# Patient Record
Sex: FEMALE | Race: WHITE | NOT HISPANIC OR LATINO
[De-identification: names, ages, dates, MRNs, and addresses within clinical notes are randomized per-mention and may not be internally consistent; named-entity substitution may affect disease eponyms.]

---

## 2017-12-07 ENCOUNTER — NON-APPOINTMENT (OUTPATIENT)
Age: 82
End: 2017-12-07

## 2017-12-07 ENCOUNTER — APPOINTMENT (OUTPATIENT)
Dept: CARDIOLOGY | Facility: CLINIC | Age: 82
End: 2017-12-07
Payer: MEDICARE

## 2017-12-07 ENCOUNTER — APPOINTMENT (OUTPATIENT)
Dept: UROGYNECOLOGY | Facility: CLINIC | Age: 82
End: 2017-12-07
Payer: MEDICARE

## 2017-12-07 VITALS
OXYGEN SATURATION: 95 % | WEIGHT: 119 LBS | HEIGHT: 62 IN | HEART RATE: 86 BPM | SYSTOLIC BLOOD PRESSURE: 141 MMHG | BODY MASS INDEX: 21.9 KG/M2 | DIASTOLIC BLOOD PRESSURE: 82 MMHG

## 2017-12-07 DIAGNOSIS — R60.9 EDEMA, UNSPECIFIED: ICD-10-CM

## 2017-12-07 DIAGNOSIS — N81.12 CYSTOCELE, LATERAL: ICD-10-CM

## 2017-12-07 DIAGNOSIS — N81.2 INCOMPLETE UTEROVAGINAL PROLAPSE: ICD-10-CM

## 2017-12-07 PROCEDURE — 93000 ELECTROCARDIOGRAM COMPLETE: CPT

## 2017-12-07 PROCEDURE — 99213 OFFICE O/P EST LOW 20 MIN: CPT

## 2017-12-07 PROCEDURE — 99214 OFFICE O/P EST MOD 30 MIN: CPT

## 2017-12-07 RX ORDER — BISOPROLOL FUMARATE 5 MG/1
5 TABLET, FILM COATED ORAL TWICE DAILY
Refills: 0 | Status: ACTIVE | COMMUNITY
Start: 2017-12-07

## 2017-12-14 ENCOUNTER — INPATIENT (INPATIENT)
Facility: HOSPITAL | Age: 82
LOS: 3 days | Discharge: HOME | DRG: 948 | End: 2017-12-18
Attending: INTERNAL MEDICINE | Admitting: INTERNAL MEDICINE
Payer: MEDICARE

## 2017-12-14 VITALS
SYSTOLIC BLOOD PRESSURE: 142 MMHG | OXYGEN SATURATION: 98 % | RESPIRATION RATE: 18 BRPM | HEART RATE: 104 BPM | DIASTOLIC BLOOD PRESSURE: 88 MMHG

## 2017-12-14 DIAGNOSIS — I48.91 UNSPECIFIED ATRIAL FIBRILLATION: ICD-10-CM

## 2017-12-14 DIAGNOSIS — H11.31 CONJUNCTIVAL HEMORRHAGE, RIGHT EYE: ICD-10-CM

## 2017-12-14 DIAGNOSIS — R79.1 ABNORMAL COAGULATION PROFILE: ICD-10-CM

## 2017-12-14 DIAGNOSIS — I10 ESSENTIAL (PRIMARY) HYPERTENSION: ICD-10-CM

## 2017-12-14 LAB
ALBUMIN SERPL ELPH-MCNC: 3.7 G/DL — SIGNIFICANT CHANGE UP (ref 3.3–5)
ALP SERPL-CCNC: 149 U/L — HIGH (ref 40–120)
ALT FLD-CCNC: 11 U/L RC — SIGNIFICANT CHANGE UP (ref 10–45)
ANION GAP SERPL CALC-SCNC: 13 MMOL/L — SIGNIFICANT CHANGE UP (ref 5–17)
APTT BLD: 79 SEC — HIGH (ref 27.5–37.4)
AST SERPL-CCNC: 15 U/L — SIGNIFICANT CHANGE UP (ref 10–40)
BASOPHILS # BLD AUTO: 0.1 K/UL — SIGNIFICANT CHANGE UP (ref 0–0.2)
BASOPHILS NFR BLD AUTO: 1 % — SIGNIFICANT CHANGE UP (ref 0–2)
BILIRUB SERPL-MCNC: 0.2 MG/DL — SIGNIFICANT CHANGE UP (ref 0.2–1.2)
BUN SERPL-MCNC: 24 MG/DL — HIGH (ref 7–23)
CALCIUM SERPL-MCNC: 9.1 MG/DL — SIGNIFICANT CHANGE UP (ref 8.4–10.5)
CHLORIDE SERPL-SCNC: 105 MMOL/L — SIGNIFICANT CHANGE UP (ref 96–108)
CO2 SERPL-SCNC: 23 MMOL/L — SIGNIFICANT CHANGE UP (ref 22–31)
CREAT SERPL-MCNC: 0.98 MG/DL — SIGNIFICANT CHANGE UP (ref 0.5–1.3)
EOSINOPHIL # BLD AUTO: 0.2 K/UL — SIGNIFICANT CHANGE UP (ref 0–0.5)
EOSINOPHIL NFR BLD AUTO: 2.2 % — SIGNIFICANT CHANGE UP (ref 0–6)
GLUCOSE SERPL-MCNC: 141 MG/DL — HIGH (ref 70–99)
HCT VFR BLD CALC: 43.4 % — SIGNIFICANT CHANGE UP (ref 34.5–45)
HGB BLD-MCNC: 14.7 G/DL — SIGNIFICANT CHANGE UP (ref 11.5–15.5)
INR BLD: 9.94 RATIO — CRITICAL HIGH (ref 0.88–1.16)
LYMPHOCYTES # BLD AUTO: 2.4 K/UL — SIGNIFICANT CHANGE UP (ref 1–3.3)
LYMPHOCYTES # BLD AUTO: 25.8 % — SIGNIFICANT CHANGE UP (ref 13–44)
MCHC RBC-ENTMCNC: 32.6 PG — SIGNIFICANT CHANGE UP (ref 27–34)
MCHC RBC-ENTMCNC: 33.8 GM/DL — SIGNIFICANT CHANGE UP (ref 32–36)
MCV RBC AUTO: 96.5 FL — SIGNIFICANT CHANGE UP (ref 80–100)
MONOCYTES # BLD AUTO: 0.4 K/UL — SIGNIFICANT CHANGE UP (ref 0–0.9)
MONOCYTES NFR BLD AUTO: 4.5 % — SIGNIFICANT CHANGE UP (ref 2–14)
NEUTROPHILS # BLD AUTO: 6.1 K/UL — SIGNIFICANT CHANGE UP (ref 1.8–7.4)
NEUTROPHILS NFR BLD AUTO: 66.5 % — SIGNIFICANT CHANGE UP (ref 43–77)
PLATELET # BLD AUTO: 274 K/UL — SIGNIFICANT CHANGE UP (ref 150–400)
POTASSIUM SERPL-MCNC: 4.3 MMOL/L — SIGNIFICANT CHANGE UP (ref 3.5–5.3)
POTASSIUM SERPL-SCNC: 4.3 MMOL/L — SIGNIFICANT CHANGE UP (ref 3.5–5.3)
PROT SERPL-MCNC: 6.9 G/DL — SIGNIFICANT CHANGE UP (ref 6–8.3)
PROTHROM AB SERPL-ACNC: 113.6 SEC — HIGH (ref 9.8–12.7)
RBC # BLD: 4.5 M/UL — SIGNIFICANT CHANGE UP (ref 3.8–5.2)
RBC # FLD: 12.4 % — SIGNIFICANT CHANGE UP (ref 10.3–14.5)
SODIUM SERPL-SCNC: 141 MMOL/L — SIGNIFICANT CHANGE UP (ref 135–145)
WBC # BLD: 9.2 K/UL — SIGNIFICANT CHANGE UP (ref 3.8–10.5)
WBC # FLD AUTO: 9.2 K/UL — SIGNIFICANT CHANGE UP (ref 3.8–10.5)

## 2017-12-14 PROCEDURE — 99285 EMERGENCY DEPT VISIT HI MDM: CPT | Mod: GC

## 2017-12-14 PROCEDURE — 70450 CT HEAD/BRAIN W/O DYE: CPT | Mod: 26

## 2017-12-14 PROCEDURE — 99223 1ST HOSP IP/OBS HIGH 75: CPT

## 2017-12-14 RX ORDER — BISOPROLOL FUMARATE 10 MG/1
2.5 TABLET, FILM COATED ORAL
Qty: 0 | Refills: 0 | Status: DISCONTINUED | OUTPATIENT
Start: 2017-12-14 | End: 2017-12-18

## 2017-12-14 RX ORDER — QUETIAPINE FUMARATE 200 MG/1
50 TABLET, FILM COATED ORAL
Qty: 0 | Refills: 0 | Status: DISCONTINUED | OUTPATIENT
Start: 2017-12-14 | End: 2017-12-18

## 2017-12-14 RX ORDER — PHYTONADIONE (VIT K1) 5 MG
5 TABLET ORAL ONCE
Qty: 0 | Refills: 0 | Status: COMPLETED | OUTPATIENT
Start: 2017-12-14 | End: 2017-12-14

## 2017-12-14 RX ADMIN — Medication 5 MILLIGRAM(S): at 17:24

## 2017-12-14 NOTE — H&P ADULT - HISTORY OF PRESENT ILLNESS
83 yo woman with PMH of HTN, AFib on coumadin, osteoporosis presents from [facility] assisted living in setting of elevated INR. 81 yo woman with PMH of HTN, AFib on coumadin, osteoporosis presents from [facility] assisted living in setting of abnormal lab results. Patient on coumadin. Patient with eye redness, she can not recall how long the redness has been. Denies eye pain, blurry vision (wears glasses at baseline). Patient denies gum bleeding, epistaxis, hematuria, melena, BRBPR. Patient without acute complaints at this time.  Of note, Patient recently moved here from [place] and just moved to the [facility] a few weeks ago. Patient has reported short-term memory  loss and documented dementia. 83 yo woman with PMH of HTN, AFib on coumadin, osteoporosis presents from [facility] assisted living in setting of abnormal lab results. Patient on coumadin. Patient with eye redness, she can not recall how long the redness has been. Denies eye pain, blurry vision (wears glasses at baseline). Patient denies gum bleeding, epistaxis, hematuria, melena, BRBPR. Patient without acute complaints at this time. Patient has been receiving coumadin 2.5 and 5 mg every other day at assisted living facility.  Of note, Patient recently moved here from [place] and just moved to the [facility] a few weeks ago. Patient has reported short-term memory  loss and documented dementia.

## 2017-12-14 NOTE — ED ADULT NURSE REASSESSMENT NOTE - NS ED NURSE REASSESS COMMENT FT1
Pt daughter expressed anger that the staff is not informing her on her mothers situation, after MD explained the plan of care for patient thoroughly. Daughter is angry that the CT results are not back yet. MD notified. Pt stable, resting comfortably in bed.

## 2017-12-14 NOTE — ED PROVIDER NOTE - PHYSICAL EXAMINATION
subconjuntival hemorrhage of right eye.  demented  NAD, NCAT, MMM, Trachea midline, Normal conjunctiva, CTAB, Non-tachycardic, Normal perfusion, Soft, NTND, No rebound/guarding, No edema, No deformity of extremities, Appropriate, Cooperative, Without capacity and insight, No rashes, CN grossly intact

## 2017-12-14 NOTE — ED ADULT NURSE REASSESSMENT NOTE - NS ED NURSE REASSESS COMMENT FT1
Pt expressed increasing AMS while stay in the ED. MD and RN explained plan of care to patient. Pt verbalized extreme anger and confusion about situation and stated 'no one told me anything, I have been sitting here for hours with no care'. MD and RN reassured patient about care, and explained plan for rest of stay. Pt currently resting comfortably in bed, side rails up for safety, pt next to nursing station and in sight.

## 2017-12-14 NOTE — H&P ADULT - ATTENDING COMMENTS
Dr. Lamar Christianson accepted patient's case and requested in house hospitalist team to complete admission. Patient previously unknown to me and I was assigned to case. Dr. Christianson to assume care in AM. Case discussed in detail with overnight [facility] medicine NP  Primary day team to obtain collateral information from [facility] in AM Dr. Lamar Christianson accepted patient's case and requested in house hospitalist team to complete admission. Patient previously unknown to me and I was assigned to case. Dr. Christianson to assume care in AM. Case discussed in detail with overnight [facility] medicine NP, Jovita [id number]  Primary day team to obtain collateral information from [facility] in AM Dr. Lamar Christianson accepted patient's case from the ED. and requested in house hospitalist team to complete admission. Patient previously unknown to me and I was assigned to case. Dr. Christianson to assume care in AM. Case discussed in detail with overnight [facility] medicine NP, Jovita [phone].  Primary day team to obtain collateral information from [facility] in AM

## 2017-12-14 NOTE — H&P ADULT - ASSESSMENT
81 yo woman with PMH of HTN, AFib on coumadin, osteoporosis presents from [facility] assisted living in setting of supratherapeutic INR.

## 2017-12-14 NOTE — H&P ADULT - EYES COMMENTS
subconjuntival hemorrhage medial aspect of right eye subconjunctival hemorrhage medial aspect of right eye

## 2017-12-14 NOTE — H&P ADULT - PROBLEM SELECTOR PLAN 5
Unclear why patient on debrox.  Med rec from [facility]. Primary day team to reconcile meds with PMD/[facility] in AM

## 2017-12-14 NOTE — ED PROVIDER NOTE - MEDICAL DECISION MAKING DETAILS
will get iv, labs, inr, and reassess if iNR > 9 will give vitamin K now noted subconjunctival hemorrhage may admit to inpatient for tapering and further monitoring of INR.

## 2017-12-14 NOTE — ED PROVIDER NOTE - PROGRESS NOTE DETAILS
Dr. Epps: Pt signed out to me pending INR. She is a dementia pt from the [facility] is oriented x1-2, though confused. Her INR is elevated 9.9. She is noted to have subconjunctival hemorrhage to her R eye. Will obtain CT head. Call placed to pt daughter, awaiting call back. Will giva vit K PO now. Dr. Epps: Spoke with pt chao Lui by phone. Explained lab findings and plan for CT. She understands. States pt recently moved here from [place] and was just moved into the [facility] few weeks ago. Has some dementia which manifests as short term memory deficits. MD Елена: Patient reevaluated, patient reports no symptoms at this time. Discussed labwork, and plan with daughter. After speaking with primary medical doctor Dr. Marcelino Coulter he is unable to trend INR outpatient and see her, so she would not have good f/u, recommending admission to Dr. Mathur for INR trending and monitoring. On reexamination Gen: no acute distress non toxic alert and coherent, no cyanosis HEENT: no scleral icterus PERRL EOMI right subconjunctival hemorrhage, vision 20/15 bilat with glasses,   Resp: No acute distress, equal chest rise and fall, lungs clear bilaterally, CVS: RRR S1/S2 no murmur no gallop no pedal edema, no jvd ABD: soft supple non tender, no rebound or guarding no hepatosplenomegaly no other masses. no hernias. Extremities: No deformities, no rash Neuro: Alert, No focal neuro deficits CN II -XII intact Power equal / normal. Sensory intact , ambulatory,

## 2017-12-14 NOTE — H&P ADULT - PROBLEM SELECTOR PLAN 1
Spoke with ED attending, Dr. Epps: Per discussion with outpatient provider: Dr. Marcelino Coulter he is unable to trend INR outpatient. so she would not have good f/u, PMD recommended admission to Dr. Mathur   Patient s/p Vitamin K 5 PO in the ED  Monitor PT/INR daily  Hold Coumadin  Fall Risk protocol  Bleeding Risk protocol Spoke with ED attending, Dr. Epps: Per discussion with outpatient provider: Dr. Marcelino Coulter he is unable to trend INR outpatient. so she would not have good f/u, PMD recommended admission to Dr. Mathur   Patient s/p Vitamin K 5 PO in the ED  Monitor PT/INR. Vitamin K repeat as necessary  Hold Coumadin  Fall Risk protocol  Bleeding Risk protocol

## 2017-12-14 NOTE — H&P ADULT - MENTAL STATUS
Alert to self and place and why she is here. Conversational Alert to self and place and time and why she is here. Conversational

## 2017-12-14 NOTE — H&P ADULT - RS GEN PE MLT RESP DETAILS PC
Neuro no rhonchi/no rales/respirations non-labored/clear to auscultation bilaterally/good air movement/airway patent/breath sounds equal

## 2017-12-14 NOTE — ED PROVIDER NOTE - OBJECTIVE STATEMENT
Patient with elevated INR. Patient without reported bleeding. Limited history secondary to dementia. Paper work from assisted living with report of INR 8.9.

## 2017-12-14 NOTE — ED PROVIDER NOTE - ATTENDING CONTRIBUTION TO CARE
Otherwise, Agree with H/P/MDM above by resident and myself, Anthony Garces MD.   Will follow up on labs, analgesia, reassess and disposition to the inpatient team as clinically indicated.

## 2017-12-14 NOTE — ED ADULT NURSE NOTE - OBJECTIVE STATEMENT
82 year old female presented to ED via EMS from [facility] with c/o of abnormal labs (PT, INR). Pt denies pain. Pt denies CP, SOB, nausea/vomiting, numbness/tingling, fever, cough, chills, dizziness, headache. Pt a&ox3, lung sounds clear, heart rate regular, abdomen soft nontender nondistended to palp. Skin intact. IV in right hand 20G and patent. Pt currently resting in bed, side rails up for safety. Will continue to monitor and assess while offering support and reassurance.

## 2017-12-14 NOTE — H&P ADULT - NSHPLABSRESULTS_GEN_ALL_CORE
Personally reviewed labs and noted in detail below    Reviewed  CT head    "No acute intracranial hemorrhage, mass effect, or CT evidence of acute territorial infarct. "

## 2017-12-14 NOTE — ED PROVIDER NOTE - CARE PLAN
Principal Discharge DX:	Supratherapeutic INR  Secondary Diagnosis:	Subconjunctival hemorrhage, right

## 2017-12-14 NOTE — ED PROVIDER NOTE - PMH
Adult Hypothyroidism  dx:  9/09.  meds X 3 months.  off meds since 12/09.  Afib  paroxysmal.  ( 2008).  treated with metoprolol;  History of Renal Calculi  no treatment; assymptomatic.  HTN (Hypertension)    Hypercholesterolemia    Murmur  MVP

## 2017-12-14 NOTE — H&P ADULT - NSHPSOCIALHISTORY_GEN_ALL_CORE
Lives in assisted living  Daughter, Sania, involved in care  Former smoker ~30-40 years quit 10 years ago  No EtOH use

## 2017-12-14 NOTE — ED ADULT NURSE NOTE - CHPI ED SYMPTOMS NEG
no weakness/no tingling/no nausea/no vomiting/no chills/no decreased eating/drinking/no pain/no dizziness/no numbness/no fever

## 2017-12-15 ENCOUNTER — OTHER (OUTPATIENT)
Age: 82
End: 2017-12-15

## 2017-12-15 ENCOUNTER — TRANSCRIPTION ENCOUNTER (OUTPATIENT)
Age: 82
End: 2017-12-15

## 2017-12-15 DIAGNOSIS — Z79.899 OTHER LONG TERM (CURRENT) DRUG THERAPY: ICD-10-CM

## 2017-12-15 LAB
ANION GAP SERPL CALC-SCNC: 13 MMOL/L — SIGNIFICANT CHANGE UP (ref 5–17)
APTT BLD: 37.7 SEC — HIGH (ref 27.5–37.4)
BASOPHILS # BLD AUTO: 0.09 K/UL — SIGNIFICANT CHANGE UP (ref 0–0.2)
BASOPHILS NFR BLD AUTO: 0.8 % — SIGNIFICANT CHANGE UP (ref 0–2)
BUN SERPL-MCNC: 17 MG/DL — SIGNIFICANT CHANGE UP (ref 7–23)
CALCIUM SERPL-MCNC: 9.9 MG/DL — SIGNIFICANT CHANGE UP (ref 8.4–10.5)
CHLORIDE SERPL-SCNC: 104 MMOL/L — SIGNIFICANT CHANGE UP (ref 96–108)
CO2 SERPL-SCNC: 23 MMOL/L — SIGNIFICANT CHANGE UP (ref 22–31)
CREAT SERPL-MCNC: 0.94 MG/DL — SIGNIFICANT CHANGE UP (ref 0.5–1.3)
EOSINOPHIL # BLD AUTO: 0.14 K/UL — SIGNIFICANT CHANGE UP (ref 0–0.5)
EOSINOPHIL NFR BLD AUTO: 1.3 % — SIGNIFICANT CHANGE UP (ref 0–6)
GLUCOSE SERPL-MCNC: 115 MG/DL — HIGH (ref 70–99)
HCT VFR BLD CALC: 44.9 % — SIGNIFICANT CHANGE UP (ref 34.5–45)
HGB BLD-MCNC: 14.9 G/DL — SIGNIFICANT CHANGE UP (ref 11.5–15.5)
IMM GRANULOCYTES NFR BLD AUTO: 0.1 % — SIGNIFICANT CHANGE UP (ref 0–1.5)
INR BLD: 1.75 RATIO — HIGH (ref 0.88–1.16)
LYMPHOCYTES # BLD AUTO: 1.83 K/UL — SIGNIFICANT CHANGE UP (ref 1–3.3)
LYMPHOCYTES # BLD AUTO: 17.2 % — SIGNIFICANT CHANGE UP (ref 13–44)
MAGNESIUM SERPL-MCNC: 2.1 MG/DL — SIGNIFICANT CHANGE UP (ref 1.6–2.6)
MCHC RBC-ENTMCNC: 30 PG — SIGNIFICANT CHANGE UP (ref 27–34)
MCHC RBC-ENTMCNC: 33.2 GM/DL — SIGNIFICANT CHANGE UP (ref 32–36)
MCV RBC AUTO: 90.3 FL — SIGNIFICANT CHANGE UP (ref 80–100)
MONOCYTES # BLD AUTO: 0.54 K/UL — SIGNIFICANT CHANGE UP (ref 0–0.9)
MONOCYTES NFR BLD AUTO: 5.1 % — SIGNIFICANT CHANGE UP (ref 2–14)
NEUTROPHILS # BLD AUTO: 8.06 K/UL — HIGH (ref 1.8–7.4)
NEUTROPHILS NFR BLD AUTO: 75.5 % — SIGNIFICANT CHANGE UP (ref 43–77)
PHOSPHATE SERPL-MCNC: 3 MG/DL — SIGNIFICANT CHANGE UP (ref 2.5–4.5)
PLATELET # BLD AUTO: 310 K/UL — SIGNIFICANT CHANGE UP (ref 150–400)
POTASSIUM SERPL-MCNC: 4.7 MMOL/L — SIGNIFICANT CHANGE UP (ref 3.5–5.3)
POTASSIUM SERPL-SCNC: 4.7 MMOL/L — SIGNIFICANT CHANGE UP (ref 3.5–5.3)
PROTHROM AB SERPL-ACNC: 20 SEC — HIGH (ref 10–13.1)
RBC # BLD: 4.97 M/UL — SIGNIFICANT CHANGE UP (ref 3.8–5.2)
RBC # FLD: 14 % — SIGNIFICANT CHANGE UP (ref 10.3–14.5)
SODIUM SERPL-SCNC: 140 MMOL/L — SIGNIFICANT CHANGE UP (ref 135–145)
WBC # BLD: 10.67 K/UL — HIGH (ref 3.8–10.5)
WBC # FLD AUTO: 10.67 K/UL — HIGH (ref 3.8–10.5)

## 2017-12-15 PROCEDURE — 99223 1ST HOSP IP/OBS HIGH 75: CPT

## 2017-12-15 PROCEDURE — 93010 ELECTROCARDIOGRAM REPORT: CPT

## 2017-12-15 RX ORDER — WARFARIN SODIUM 2.5 MG/1
0 TABLET ORAL
Qty: 0 | Refills: 0 | COMMUNITY

## 2017-12-15 RX ORDER — WARFARIN SODIUM 2.5 MG/1
3 TABLET ORAL ONCE
Qty: 0 | Refills: 0 | Status: COMPLETED | OUTPATIENT
Start: 2017-12-15 | End: 2017-12-15

## 2017-12-15 RX ORDER — ENOXAPARIN SODIUM 100 MG/ML
60 INJECTION SUBCUTANEOUS EVERY 12 HOURS
Qty: 0 | Refills: 0 | Status: DISCONTINUED | OUTPATIENT
Start: 2017-12-15 | End: 2017-12-18

## 2017-12-15 RX ORDER — CARBAMIDE PEROXIDE 81.86 MG/ML
2 SOLUTION/ DROPS AURICULAR (OTIC)
Qty: 0 | Refills: 0 | COMMUNITY

## 2017-12-15 RX ORDER — CARBAMIDE PEROXIDE 81.86 MG/ML
5 SOLUTION/ DROPS AURICULAR (OTIC)
Qty: 0 | Refills: 0 | COMMUNITY

## 2017-12-15 RX ORDER — WARFARIN SODIUM 2.5 MG/1
1 TABLET ORAL
Qty: 0 | Refills: 0 | COMMUNITY

## 2017-12-15 RX ORDER — CARBAMIDE PEROXIDE 81.86 MG/ML
2 SOLUTION/ DROPS AURICULAR (OTIC)
Qty: 0 | Refills: 0 | Status: DISCONTINUED | OUTPATIENT
Start: 2017-12-15 | End: 2017-12-15

## 2017-12-15 RX ORDER — HALOPERIDOL 2 MG/ML
2 SOLUTION, CONCENTRATE ORAL
Refills: 0 | Status: DISCONTINUED | COMMUNITY
Start: 2017-12-07 | End: 2017-12-15

## 2017-12-15 RX ADMIN — BISOPROLOL FUMARATE 2.5 MILLIGRAM(S): 10 TABLET, FILM COATED ORAL at 09:44

## 2017-12-15 RX ADMIN — BISOPROLOL FUMARATE 2.5 MILLIGRAM(S): 10 TABLET, FILM COATED ORAL at 17:12

## 2017-12-15 RX ADMIN — WARFARIN SODIUM 3 MILLIGRAM(S): 2.5 TABLET ORAL at 22:34

## 2017-12-15 RX ADMIN — QUETIAPINE FUMARATE 50 MILLIGRAM(S): 200 TABLET, FILM COATED ORAL at 17:13

## 2017-12-15 RX ADMIN — QUETIAPINE FUMARATE 50 MILLIGRAM(S): 200 TABLET, FILM COATED ORAL at 09:44

## 2017-12-15 RX ADMIN — ENOXAPARIN SODIUM 60 MILLIGRAM(S): 100 INJECTION SUBCUTANEOUS at 17:13

## 2017-12-15 NOTE — CONSULT NOTE ADULT - SUBJECTIVE AND OBJECTIVE BOX
[facility] - Guerrero Howard, Maximo Gee, Shelli, Jason Tavera  Office Number: [phone]    Initial Consult Note    CHIEF COMPLAINT: Patient is a 82y old  Female who presents with a chief complaint of elevated INR (15 Dec 2017 10:32)      HPI:  81 yo woman with PMH of HTN, AFib on coumadin, osteoporosis presents from [facility] assisted [facility] in setting of abnormal lab results. Patient on coumadin. Patient with right sided eye redness.  She was sent in for a high INR in the context of hyphema.  Patient denies gum bleeding, epistaxis, hematuria, melena, BRBPR. Patient without acute complaints at this time. Patient has been receiving coumadin 2.5 and 5 mg every other day at assisted living facility.  Of note, Patient recently moved here from [place] and just moved to the [facility] a few weeks ago. Patient has reported short-term memory  loss and documented dementia. (14 Dec 2017 23:09)    She was seen in our office one week ago and was noted to be doing fine.  We are not managing her iNR.        PAST MEDICAL & SURGICAL HISTORY:  History of Renal Calculi: no treatment; assymptomatic.  Murmur: MVP  Afib: paroxysmal.  ( 2008).  treated with metoprolol;  HTN (Hypertension)  Hypercholesterolemia  Adult Hypothyroidism: dx:  9/09.  meds X 3 months.  off meds since 12/09.  S/P Colonoscopy: excision &quot;benign&quot; polyps.  ( 2004)  S/P Appendectomy: age 19.      SOCIAL HISTORY:  No tobacco, ethanol, or drug abuse.    FAMILY HISTORY:  No pertinent family history in first degree relatives    No family history of acute MI or sudden cardiac death.    MEDICATIONS  (STANDING):  bisoprolol   Tablet 2.5 milliGRAM(s) Oral two times a day  QUEtiapine 50 milliGRAM(s) Oral two times a day    MEDICATIONS  (PRN):      Allergies    No Known Allergies    Intolerances        REVIEW OF SYSTEMS:       All other review of systems is negative unless indicated above    VITAL SIGNS:   Vital Signs Last 24 Hrs  T(C): 36.6 (15 Dec 2017 04:53), Max: 36.7 (14 Dec 2017 18:12)  T(F): 97.8 (15 Dec 2017 04:53), Max: 98 (14 Dec 2017 18:12)  HR: 98 (15 Dec 2017 04:53) (85 - 104)  BP: 163/87 (15 Dec 2017 04:53) (125/84 - 163/87)  BP(mean): --  RR: 17 (15 Dec 2017 04:53) (17 - 18)  SpO2: 97% (15 Dec 2017 04:53) (97% - 99%)    I&O's Summary    15 Dec 2017 07:01  -  15 Dec 2017 10:58  --------------------------------------------------------  IN: 460 mL / OUT: 0 mL / NET: 460 mL        On Exam:    Constitutional: NAD, alert and oriented x 3  Lungs:  Non-labored, breath sounds are clear bilaterally, No wheezing, rales or rhonchi  Cardiovascular: IRRR.  S1 and S2 positive.  No murmurs, rubs, gallops or clicks  Gastrointestinal: Bowel Sounds present, soft, nontender.   Lymph: No peripheral edema. No cervical lymphadenopathy.  Neurological: Alert, no focal deficits  Skin: No rashes or ulcers   Psych:  Mood & affect appropriate.    LABS: All Labs Reviewed:                        14.7   9.2   )-----------( 274      ( 14 Dec 2017 14:33 )             43.4     14 Dec 2017 14:33    141    |  105    |  24     ----------------------------<  141    4.3     |  23     |  0.98     Ca    9.1        14 Dec 2017 14:33    TPro  6.9    /  Alb  3.7    /  TBili  0.2    /  DBili  x      /  AST  15     /  ALT  11     /  AlkPhos  149    14 Dec 2017 14:33    PT/INR - ( 14 Dec 2017 14:39 )   PT: 113.6 sec;   INR: 9.94 ratio         PTT - ( 14 Dec 2017 14:39 )  PTT:79.0 sec         RADIOLOGY:    < from: CT Head No Cont (12.14.17 @ 18:36) >    EXAM:  CT BRAIN                            PROCEDURE DATE:  12/14/2017            INTERPRETATION:  NONCONTRAST CT OF THE BRAIN DATED 12/14/2017.    CLINICAL HISTORY: Supratherapeutic INR, evaluate for intracranial   hemorrhage.    TECHNIQUE: Axial CT images are obtained from the cranial vertex to the   skull base without the administration of IV contrast. Coronal and   sagittal reformats were performed.    No comparison studies available.    FINDINGS:    There is no acute intracranial hemorrhage. There is no mass effect or   shift of the midline. The basal cisterns are not effaced. There is   cerebral volume loss with prominence of the ventricles and sulci. Mild   chronic ischemic changes are seen in the frontoparietal white matter.   Thereis no CT evidence of a acute territorial infarct.    Visualized paranasal sinuses and mastoid air cells are well aerated.    IMPRESSION:    No acute intracranial hemorrhage, mass effect, or CT evidence of acute   territorial infarct.    < end of copied text >

## 2017-12-15 NOTE — DISCHARGE NOTE ADULT - MEDICATION SUMMARY - MEDICATIONS TO TAKE
I will START or STAY ON the medications listed below when I get home from the hospital:    Tylenol 325 mg oral tablet  -- orally 2 times a day  -- Indication: For pain     aspirin 325 mg oral tablet  -- 1 tab(s) by mouth 2 times a day - per RN at [facility] Rx for back pain from Dr. Coulter  -- Indication: For back pain    warfarin 2 mg oral tablet  -- 1 tab(s) by mouth once a day . Have your INR checked.   -- Indication: For Atrial fibrillation    SEROquel 50 mg oral tablet  -- 1 tab(s) by mouth 2 times a day  -- Indication: For Anxiety    bisoprolol 5 mg oral tablet  -- 0.5 tab(s) by mouth 2 times a day  -- Indication: For High blood pressure I will START or STAY ON the medications listed below when I get home from the hospital:    Tylenol 325 mg oral tablet  -- orally 2 times a day  -- Indication: For pain     traMADol 50 mg oral tablet  -- 1 tab(s) by mouth every 6 hours, As Needed MDD:200mg  -- Caution federal law prohibits the transfer of this drug to any person other  than the person for whom it was prescribed.  May cause drowsiness.  Alcohol may intensify this effect.  Use care when operating dangerous machinery.  Obtain medical advice before taking any non-prescription drugs as some may affect the action of this medication.    -- Indication: For back pain    warfarin 1 mg oral tablet  -- 1 tab(s) by mouth once a day  x2 days (12/18 and 12/19) MDD:1mg  -- Do not take this drug if you are pregnant.  It is very important that you take or use this exactly as directed.  Do not skip doses or discontinue unless directed by your doctor.  Obtain medical advice before taking any non-prescription drugs as some may affect the action of this medication.    -- Indication: For Atrial fibrillation    warfarin 2 mg oral tablet  -- 1 tab(s) by mouth once a day. Start on 12/20. MDD:2mg  -- Do not take this drug if you are pregnant.  It is very important that you take or use this exactly as directed.  Do not skip doses or discontinue unless directed by your doctor.  Obtain medical advice before taking any non-prescription drugs as some may affect the action of this medication.    -- Indication: For Atrial fibrillation    SEROquel 50 mg oral tablet  -- 1 tab(s) by mouth 2 times a day  -- Indication: For Anxiety    bisoprolol 5 mg oral tablet  -- 0.5 tab(s) by mouth 2 times a day  -- Indication: For High blood pressure I will START or STAY ON the medications listed below when I get home from the hospital:    traMADol 50 mg oral tablet  -- 1 tab(s) by mouth every 6 hours, As Needed MDD:200mg  -- Caution federal law prohibits the transfer of this drug to any person other  than the person for whom it was prescribed.  May cause drowsiness.  Alcohol may intensify this effect.  Use care when operating dangerous machinery.  Obtain medical advice before taking any non-prescription drugs as some may affect the action of this medication.    -- Indication: For back pain    Tylenol 325 mg oral tablet  -- 2 tab(s) by mouth 2 times a day, As Needed   -- Indication: For Back pain    warfarin 1 mg oral tablet  -- 1 tab(s) by mouth once a day  x2 days (12/18 and 12/19) MDD:1mg  -- Do not take this drug if you are pregnant.  It is very important that you take or use this exactly as directed.  Do not skip doses or discontinue unless directed by your doctor.  Obtain medical advice before taking any non-prescription drugs as some may affect the action of this medication.    -- Indication: For Atrial fibrillation    warfarin 2 mg oral tablet  -- 1 tab(s) by mouth once a day. Start on 12/20. MDD:2mg  -- Do not take this drug if you are pregnant.  It is very important that you take or use this exactly as directed.  Do not skip doses or discontinue unless directed by your doctor.  Obtain medical advice before taking any non-prescription drugs as some may affect the action of this medication.    -- Indication: For Atrial fibrillation    SEROquel 50 mg oral tablet  -- 1 tab(s) by mouth 2 times a day  -- Indication: For Mood    bisoprolol 5 mg oral tablet  -- 0.5 tab(s) by mouth 2 times a day  -- Indication: For High blood pressure

## 2017-12-15 NOTE — DISCHARGE NOTE ADULT - HOSPITAL COURSE
81 yo woman with PMH of HTN, carotid disease s/p CEA, AFib on coumadin, osteoporosis presents from [facility] assisted living in setting of abnormal lab results. Patient on coumadin. Patient with right sided eye redness.  She was sent in for a high INR in the context of hyphema. Patient denies gum bleeding, epistaxis, hematuria, melena, BRBPR. Patient without acute complaints at this time. Patient has been receiving coumadin 2.5 and 5 mg every other day at assisted living facility. Patient has reported short-term memory  loss and documented dementia. INR improved to 1.75 after Vitamin K . Lovenox therapeutic dose bridged to Coumadin was started for INR goal of 2-3. Pt will follow outpatient with Dr. Gee for INR follow-up.  Continue bisoprolol at current dose. 83 yo woman with PMH of HTN, carotid disease s/p CEA, AFib on coumadin, osteoporosis presents from [facility] assisted living in setting of abnormal lab results. Patient on coumadin. Patient with right sided eye redness.  She was sent in for a high INR in the context of hyphema. Patient denies gum bleeding, epistaxis, hematuria, melena, BRBPR. Patient without acute complaints at this time. Patient has been receiving coumadin 2.5 and 5 mg every other day at assisted living facility. Patient has reported short-term memory  loss and documented dementia. INR improved to 1.75 after Vitamin K . Lovenox therapeutic dose bridged to Coumadin was started for INR goal of 2-3. Pt will follow outpatient with Dr. Gee for INR follow-up.  Continue bisoprolol at current dose. Patient to be discharged on 1mg warfarin once daily x2days (12/18 and 12/19), and start warfarin 2mg once daily on 12/20, and should see her PMD on 12/21 for the next PT/INR evaluation. Aspirin 325mg BID discontinued at discharge, patient may take tramadol 50mg d4pxpoh as needed for pain.

## 2017-12-15 NOTE — DISCHARGE NOTE ADULT - ADDITIONAL INSTRUCTIONS
Follow-up with your primary care physician within 1 week. Have INR checked. Call for appointment.   Follow-up with cardiologist Dr. Gee. Dr. Gee THURSDAY 12/21/17 for INR check *****

## 2017-12-15 NOTE — DISCHARGE NOTE ADULT - NS AS DC VTE INSTRUCTION
Coumadin/Warfarin - Dietary Advice.../Coumadin/Warfarin - Compliance.../Coumadin/Warfarin - Potential for adverse drug reactions and interactions/Coumadin/Warfarin - Follow-up monitoring... Coumadin/Warfarin - Compliance.../Coumadin/Warfarin - Dietary Advice.../Coumadin/Warfarin - Follow-up monitoring.../Coumadin/Warfarin - Potential for adverse drug reactions and interactions

## 2017-12-15 NOTE — PHYSICAL THERAPY INITIAL EVALUATION ADULT - REFERRING PHYSICIAN, REHAB EVAL
pertinent history continued... Patient has been receiving coumadin 2.5 and 5 mg every other day at assisted living facility. pertinent history continued... Patient has been receiving coumadin 2.5 and 5 mg every other day at assisted living facility.  Of note, Patient recently moved here from [place] and just moved to the [facility] a few weeks ago. Patient has reported short-term memory  loss and documented dementia..

## 2017-12-15 NOTE — PHYSICAL THERAPY INITIAL EVALUATION ADULT - LIVES WITH, PROFILE
Pt lives at the [facility]. Pt has (+)elevator within, 0 stairs to negotiate.

## 2017-12-15 NOTE — DISCHARGE NOTE ADULT - PLAN OF CARE
Resolved This medication is used to thin the blood, to prevent and treat blood clots.  Take this medication Daily as prescribed by your Health Care Provider.  Tell your Dentist, Surgeon, and other Doctors that you are on this drug.  This medication requires PT/INR Blood work monitoring,  Your appointment for a PT/INR check is on __________________  Please see below for further Coumadin Instructions Atrial fibrillation is the most common heart rhythm problem & has the risk of stroke & heart attack  It helps if you control your blood pressure, not drink more than 1-2 alcohol drinks per day, cut down on caffeine, getting treatment for over active thyroid gland, & getting exercise  Call your doctor if you feel your heart racing or beating unusually, chest tightness or pain, lightheaded, faint, shortness of breath especially with exercise  It is important to take your heart medication as prescribed  You may be on anticoagulation which is very important to take as directed - you may need blood work to monitor drug levels Low salt diet  Activity as tolerated.  Take all medication as prescribed.  Follow up with your medical doctor for routine blood pressure monitoring at your next visit.  Notify your doctor if you have any of the following symptoms:   Dizziness, Lightheadedness, Blurry vision, Headache, Chest pain, Shortness of breath Follow-up with your primary care physician within 1 week. Take 1mg warfarin once daily on 12/18 and 12/19. Take 2mg warfarin once daily starting on 12/20.  This medication is used to thin the blood, to prevent and treat blood clots.  Take this medication Daily as prescribed by your Health Care Provider.  Tell your Dentist, Surgeon, and other Doctors that you are on this drug.  This medication requires PT/INR Blood work monitoring,  Your appointment for a PT/INR check should be for Thursday 12/21.  Please see below for further Coumadin Instructions Take 1mg warfarin once daily on 12/18/17 and 12/19/17.  Then, Take 2mg warfarin once daily starting on 12/20/17. YOU MUST GET INR CHECKED THURS 12/21/17  This medication is used to thin the blood, to prevent and treat blood clots.  Take this medication Daily as prescribed by your Health Care Provider.  Tell your Dentist, Surgeon, and other Doctors that you are on this drug.  This medication requires PT/INR Blood work monitoring,  Your appointment for a PT/INR check should be for Thursday 12/21.  Please see below for further Coumadin Instructions No aspirin as risk for bleeding with couamdin/warfarin. Take tramadol as needed. Follow up with PMD form management

## 2017-12-15 NOTE — PHYSICAL THERAPY INITIAL EVALUATION ADULT - PERTINENT HX OF CURRENT PROBLEM, REHAB EVAL
Pt is an 83 y/o female with PMH of HTN, AFib on coumadin, osteoporosis presents from [facility] assisted living in setting of abnormal lab results. Patient on coumadin. Patient with eye redness, she can not recall how long the redness has been. Denies eye pain, blurry vision (wears glasses at baseline). Patient denies gum bleeding, epistaxis, hematuria, melena, BRBPR. Patient without acute complaints at this time.

## 2017-12-15 NOTE — DISCHARGE NOTE ADULT - CONDITIONS AT DISCHARGE
Alert and responsive with confusion at times. Skin color good warm and dry to touch. Respirations regular and unlabored. Denies pain or discomfort. Ambulating on unit with steady gait. Appetite good at meals. No distress noted at time of discharge.

## 2017-12-15 NOTE — DISCHARGE NOTE ADULT - MEDICATION SUMMARY - MEDICATIONS TO STOP TAKING
I will STOP taking the medications listed below when I get home from the hospital:    Debrox 6.5% otic solution  -- 5 drop(s) to each affected ear 2 times a day (7 days)    Debrox 6.5% otic solution  -- 2 drop(s) to each affected ear 2 times a day I will STOP taking the medications listed below when I get home from the hospital:    Debrox 6.5% otic solution  -- 5 drop(s) to each affected ear 2 times a day (7 days)    Debrox 6.5% otic solution  -- 2 drop(s) to each affected ear 2 times a day    aspirin 325 mg oral tablet  -- 1 tab(s) by mouth 2 times a day - per RN at [facility] Rx for back pain from Dr. Coulter I will STOP taking the medications listed below when I get home from the hospital:    Debrox 6.5% otic solution  -- 5 drop(s) to each affected ear 2 times a day (7 days)    Debrox 6.5% otic solution  -- 2 drop(s) to each affected ear 2 times a day    aspirin 325 mg oral tablet  -- 1 tab(s) by mouth 2 times a day - per RN at [facility] Rx for back pain from Dr. Coulter I will STOP taking the medications listed below when I get home from the hospital:    Debrox 6.5% otic solution  -- 5 drop(s) to each affected ear 2 times a day (7 days)    Debrox 6.5% otic solution  -- 2 drop(s) to each affected ear 2 times a day    aspirin 325 mg oral tablet  -- 1 tab(s) by mouth 2 times a day - per RN at [facility] Rx for back pain from Dr. Coulter

## 2017-12-15 NOTE — DISCHARGE NOTE ADULT - NS AS DC FOLLOWUP STROKE INST
Smoking Cessation/Influenza vaccination (VIS Pub Date: August 19, 2014)/Coumadin/Warfarin Smoking Cessation/Influenza vaccination (VIS Pub Date: August 19, 2014) Coumadin/Warfarin/Smoking Cessation/Influenza vaccination (VIS Pub Date: August 19, 2014) Coumadin/Warfarin

## 2017-12-15 NOTE — CONSULT NOTE ADULT - ASSESSMENT
82 year old woman with atrial fibrillation on AC with Coumadin, hypertension, carotid disease s/p CEA presents with hyphema and high INR, s/p vitamin K.  She should continue Coumadin with a goal INR of 2-3.  We are happy to manage her INR in our office if need be.  There is no evidence of life threatening bleeding . Continue bisoprolol at current dose.  She will follow with Dr. Gee per routine as an outpatient.

## 2017-12-15 NOTE — DISCHARGE NOTE ADULT - CARE PLAN
Principal Discharge DX:	Supratherapeutic INR  Secondary Diagnosis:	Afib Principal Discharge DX:	Supratherapeutic INR  Goal:	Resolved  Instructions for follow-up, activity and diet:	This medication is used to thin the blood, to prevent and treat blood clots.  Take this medication Daily as prescribed by your Health Care Provider.  Tell your Dentist, Surgeon, and other Doctors that you are on this drug.  This medication requires PT/INR Blood work monitoring,  Your appointment for a PT/INR check is on __________________  Please see below for further Coumadin Instructions  Secondary Diagnosis:	Afib  Instructions for follow-up, activity and diet:	Atrial fibrillation is the most common heart rhythm problem & has the risk of stroke & heart attack  It helps if you control your blood pressure, not drink more than 1-2 alcohol drinks per day, cut down on caffeine, getting treatment for over active thyroid gland, & getting exercise  Call your doctor if you feel your heart racing or beating unusually, chest tightness or pain, lightheaded, faint, shortness of breath especially with exercise  It is important to take your heart medication as prescribed  You may be on anticoagulation which is very important to take as directed - you may need blood work to monitor drug levels  Secondary Diagnosis:	HTN (Hypertension)  Instructions for follow-up, activity and diet:	Low salt diet  Activity as tolerated.  Take all medication as prescribed.  Follow up with your medical doctor for routine blood pressure monitoring at your next visit.  Notify your doctor if you have any of the following symptoms:   Dizziness, Lightheadedness, Blurry vision, Headache, Chest pain, Shortness of breath  Secondary Diagnosis:	Subconjunctival hemorrhage, right  Instructions for follow-up, activity and diet:	Follow-up with your primary care physician within 1 week. Principal Discharge DX:	Supratherapeutic INR  Goal:	Resolved  Instructions for follow-up, activity and diet:	Take 1mg warfarin once daily on 12/18 and 12/19. Take 2mg warfarin once daily starting on 12/20.  This medication is used to thin the blood, to prevent and treat blood clots.  Take this medication Daily as prescribed by your Health Care Provider.  Tell your Dentist, Surgeon, and other Doctors that you are on this drug.  This medication requires PT/INR Blood work monitoring,  Your appointment for a PT/INR check should be for Thursday 12/21.  Please see below for further Coumadin Instructions  Secondary Diagnosis:	Afib  Instructions for follow-up, activity and diet:	Atrial fibrillation is the most common heart rhythm problem & has the risk of stroke & heart attack  It helps if you control your blood pressure, not drink more than 1-2 alcohol drinks per day, cut down on caffeine, getting treatment for over active thyroid gland, & getting exercise  Call your doctor if you feel your heart racing or beating unusually, chest tightness or pain, lightheaded, faint, shortness of breath especially with exercise  It is important to take your heart medication as prescribed  You may be on anticoagulation which is very important to take as directed - you may need blood work to monitor drug levels  Secondary Diagnosis:	HTN (Hypertension)  Instructions for follow-up, activity and diet:	Low salt diet  Activity as tolerated.  Take all medication as prescribed.  Follow up with your medical doctor for routine blood pressure monitoring at your next visit.  Notify your doctor if you have any of the following symptoms:   Dizziness, Lightheadedness, Blurry vision, Headache, Chest pain, Shortness of breath  Secondary Diagnosis:	Subconjunctival hemorrhage, right  Instructions for follow-up, activity and diet:	Follow-up with your primary care physician within 1 week. Principal Discharge DX:	Supratherapeutic INR  Goal:	Resolved  Instructions for follow-up, activity and diet:	Take 1mg warfarin once daily on 12/18/17 and 12/19/17.  Then, Take 2mg warfarin once daily starting on 12/20/17. YOU MUST GET INR CHECKED THURS 12/21/17  This medication is used to thin the blood, to prevent and treat blood clots.  Take this medication Daily as prescribed by your Health Care Provider.  Tell your Dentist, Surgeon, and other Doctors that you are on this drug.  This medication requires PT/INR Blood work monitoring,  Your appointment for a PT/INR check should be for Thursday 12/21.  Please see below for further Coumadin Instructions  Secondary Diagnosis:	Afib  Instructions for follow-up, activity and diet:	Atrial fibrillation is the most common heart rhythm problem & has the risk of stroke & heart attack  It helps if you control your blood pressure, not drink more than 1-2 alcohol drinks per day, cut down on caffeine, getting treatment for over active thyroid gland, & getting exercise  Call your doctor if you feel your heart racing or beating unusually, chest tightness or pain, lightheaded, faint, shortness of breath especially with exercise  It is important to take your heart medication as prescribed  You may be on anticoagulation which is very important to take as directed - you may need blood work to monitor drug levels  Secondary Diagnosis:	HTN (Hypertension)  Instructions for follow-up, activity and diet:	Low salt diet  Activity as tolerated.  Take all medication as prescribed.  Follow up with your medical doctor for routine blood pressure monitoring at your next visit.  Notify your doctor if you have any of the following symptoms:   Dizziness, Lightheadedness, Blurry vision, Headache, Chest pain, Shortness of breath  Secondary Diagnosis:	Subconjunctival hemorrhage, right  Instructions for follow-up, activity and diet:	Follow-up with your primary care physician within 1 week.  Secondary Diagnosis:	Back pain  Instructions for follow-up, activity and diet:	No aspirin as risk for bleeding with couamdin/warfarin. Take tramadol as needed. Follow up with PMD form management

## 2017-12-15 NOTE — DISCHARGE NOTE ADULT - CARE PROVIDER_API CALL
Marcelino Coulter), Family Medicine  [address]  [address]  [place] [address]  Phone: [phone]  Fax: [phone]    Arnold Gee), Cardiovascular Disease  [address] [place] [address]  Phone: [phone]  Fax: [phone]

## 2017-12-16 LAB
INR BLD: 1.24 RATIO — HIGH (ref 0.88–1.16)
PROTHROM AB SERPL-ACNC: 13.5 SEC — HIGH (ref 9.8–12.7)

## 2017-12-16 PROCEDURE — 99232 SBSQ HOSP IP/OBS MODERATE 35: CPT

## 2017-12-16 RX ORDER — WARFARIN SODIUM 2.5 MG/1
5 TABLET ORAL ONCE
Qty: 0 | Refills: 0 | Status: COMPLETED | OUTPATIENT
Start: 2017-12-16 | End: 2017-12-16

## 2017-12-16 RX ADMIN — QUETIAPINE FUMARATE 50 MILLIGRAM(S): 200 TABLET, FILM COATED ORAL at 17:49

## 2017-12-16 RX ADMIN — BISOPROLOL FUMARATE 2.5 MILLIGRAM(S): 10 TABLET, FILM COATED ORAL at 05:25

## 2017-12-16 RX ADMIN — WARFARIN SODIUM 5 MILLIGRAM(S): 2.5 TABLET ORAL at 21:55

## 2017-12-16 RX ADMIN — ENOXAPARIN SODIUM 60 MILLIGRAM(S): 100 INJECTION SUBCUTANEOUS at 05:25

## 2017-12-16 RX ADMIN — QUETIAPINE FUMARATE 50 MILLIGRAM(S): 200 TABLET, FILM COATED ORAL at 05:25

## 2017-12-16 RX ADMIN — BISOPROLOL FUMARATE 2.5 MILLIGRAM(S): 10 TABLET, FILM COATED ORAL at 17:49

## 2017-12-16 RX ADMIN — ENOXAPARIN SODIUM 60 MILLIGRAM(S): 100 INJECTION SUBCUTANEOUS at 17:49

## 2017-12-17 LAB
INR BLD: 1.33 RATIO — HIGH (ref 0.88–1.16)
PROTHROM AB SERPL-ACNC: 15.1 SEC — HIGH (ref 10–13.1)

## 2017-12-17 PROCEDURE — 99232 SBSQ HOSP IP/OBS MODERATE 35: CPT

## 2017-12-17 RX ORDER — WARFARIN SODIUM 2.5 MG/1
5 TABLET ORAL ONCE
Qty: 0 | Refills: 0 | Status: COMPLETED | OUTPATIENT
Start: 2017-12-17 | End: 2017-12-17

## 2017-12-17 RX ADMIN — WARFARIN SODIUM 5 MILLIGRAM(S): 2.5 TABLET ORAL at 22:07

## 2017-12-17 RX ADMIN — BISOPROLOL FUMARATE 2.5 MILLIGRAM(S): 10 TABLET, FILM COATED ORAL at 05:35

## 2017-12-17 RX ADMIN — ENOXAPARIN SODIUM 60 MILLIGRAM(S): 100 INJECTION SUBCUTANEOUS at 18:01

## 2017-12-17 RX ADMIN — QUETIAPINE FUMARATE 50 MILLIGRAM(S): 200 TABLET, FILM COATED ORAL at 18:02

## 2017-12-17 RX ADMIN — ENOXAPARIN SODIUM 60 MILLIGRAM(S): 100 INJECTION SUBCUTANEOUS at 05:35

## 2017-12-17 RX ADMIN — BISOPROLOL FUMARATE 2.5 MILLIGRAM(S): 10 TABLET, FILM COATED ORAL at 18:00

## 2017-12-17 RX ADMIN — QUETIAPINE FUMARATE 50 MILLIGRAM(S): 200 TABLET, FILM COATED ORAL at 05:35

## 2017-12-18 VITALS — SYSTOLIC BLOOD PRESSURE: 123 MMHG | HEART RATE: 83 BPM | DIASTOLIC BLOOD PRESSURE: 78 MMHG

## 2017-12-18 LAB
ANION GAP SERPL CALC-SCNC: 12 MMOL/L — SIGNIFICANT CHANGE UP (ref 5–17)
BASOPHILS # BLD AUTO: 0.09 K/UL — SIGNIFICANT CHANGE UP (ref 0–0.2)
BASOPHILS NFR BLD AUTO: 1.2 % — SIGNIFICANT CHANGE UP (ref 0–2)
BUN SERPL-MCNC: 22 MG/DL — SIGNIFICANT CHANGE UP (ref 7–23)
CALCIUM SERPL-MCNC: 9.4 MG/DL — SIGNIFICANT CHANGE UP (ref 8.4–10.5)
CHLORIDE SERPL-SCNC: 103 MMOL/L — SIGNIFICANT CHANGE UP (ref 96–108)
CO2 SERPL-SCNC: 25 MMOL/L — SIGNIFICANT CHANGE UP (ref 22–31)
CREAT SERPL-MCNC: 1 MG/DL — SIGNIFICANT CHANGE UP (ref 0.5–1.3)
EOSINOPHIL # BLD AUTO: 0.19 K/UL — SIGNIFICANT CHANGE UP (ref 0–0.5)
EOSINOPHIL NFR BLD AUTO: 2.6 % — SIGNIFICANT CHANGE UP (ref 0–6)
GLUCOSE SERPL-MCNC: 93 MG/DL — SIGNIFICANT CHANGE UP (ref 70–99)
HCT VFR BLD CALC: 42.5 % — SIGNIFICANT CHANGE UP (ref 34.5–45)
HGB BLD-MCNC: 14.1 G/DL — SIGNIFICANT CHANGE UP (ref 11.5–15.5)
IMM GRANULOCYTES NFR BLD AUTO: 0.1 % — SIGNIFICANT CHANGE UP (ref 0–1.5)
INR BLD: 2.21 RATIO — HIGH (ref 0.88–1.16)
LYMPHOCYTES # BLD AUTO: 2.53 K/UL — SIGNIFICANT CHANGE UP (ref 1–3.3)
LYMPHOCYTES # BLD AUTO: 34.7 % — SIGNIFICANT CHANGE UP (ref 13–44)
MCHC RBC-ENTMCNC: 30.1 PG — SIGNIFICANT CHANGE UP (ref 27–34)
MCHC RBC-ENTMCNC: 33.2 GM/DL — SIGNIFICANT CHANGE UP (ref 32–36)
MCV RBC AUTO: 90.8 FL — SIGNIFICANT CHANGE UP (ref 80–100)
MONOCYTES # BLD AUTO: 0.61 K/UL — SIGNIFICANT CHANGE UP (ref 0–0.9)
MONOCYTES NFR BLD AUTO: 8.4 % — SIGNIFICANT CHANGE UP (ref 2–14)
NEUTROPHILS # BLD AUTO: 3.86 K/UL — SIGNIFICANT CHANGE UP (ref 1.8–7.4)
NEUTROPHILS NFR BLD AUTO: 53 % — SIGNIFICANT CHANGE UP (ref 43–77)
PLATELET # BLD AUTO: 263 K/UL — SIGNIFICANT CHANGE UP (ref 150–400)
POTASSIUM SERPL-MCNC: 4.5 MMOL/L — SIGNIFICANT CHANGE UP (ref 3.5–5.3)
POTASSIUM SERPL-SCNC: 4.5 MMOL/L — SIGNIFICANT CHANGE UP (ref 3.5–5.3)
PROTHROM AB SERPL-ACNC: 25.4 SEC — HIGH (ref 10–13.1)
RBC # BLD: 4.68 M/UL — SIGNIFICANT CHANGE UP (ref 3.8–5.2)
RBC # FLD: 13.9 % — SIGNIFICANT CHANGE UP (ref 10.3–14.5)
SODIUM SERPL-SCNC: 140 MMOL/L — SIGNIFICANT CHANGE UP (ref 135–145)
WBC # BLD: 7.29 K/UL — SIGNIFICANT CHANGE UP (ref 3.8–10.5)
WBC # FLD AUTO: 7.29 K/UL — SIGNIFICANT CHANGE UP (ref 3.8–10.5)

## 2017-12-18 PROCEDURE — 80053 COMPREHEN METABOLIC PANEL: CPT

## 2017-12-18 PROCEDURE — 97162 PT EVAL MOD COMPLEX 30 MIN: CPT

## 2017-12-18 PROCEDURE — 85730 THROMBOPLASTIN TIME PARTIAL: CPT

## 2017-12-18 PROCEDURE — 85027 COMPLETE CBC AUTOMATED: CPT

## 2017-12-18 PROCEDURE — 84100 ASSAY OF PHOSPHORUS: CPT

## 2017-12-18 PROCEDURE — 99232 SBSQ HOSP IP/OBS MODERATE 35: CPT

## 2017-12-18 PROCEDURE — 80048 BASIC METABOLIC PNL TOTAL CA: CPT

## 2017-12-18 PROCEDURE — 93005 ELECTROCARDIOGRAM TRACING: CPT

## 2017-12-18 PROCEDURE — 70450 CT HEAD/BRAIN W/O DYE: CPT

## 2017-12-18 PROCEDURE — 85610 PROTHROMBIN TIME: CPT

## 2017-12-18 PROCEDURE — 99285 EMERGENCY DEPT VISIT HI MDM: CPT | Mod: 25

## 2017-12-18 PROCEDURE — 83735 ASSAY OF MAGNESIUM: CPT

## 2017-12-18 RX ORDER — QUETIAPINE FUMARATE 200 MG/1
1 TABLET, FILM COATED ORAL
Qty: 60 | Refills: 0
Start: 2017-12-18 | End: 2018-01-16

## 2017-12-18 RX ORDER — WARFARIN SODIUM 2.5 MG/1
1 TABLET ORAL
Qty: 2 | Refills: 0 | OUTPATIENT
Start: 2017-12-18 | End: 2017-12-19

## 2017-12-18 RX ORDER — WARFARIN SODIUM 2.5 MG/1
1 TABLET ORAL
Qty: 14 | Refills: 0 | OUTPATIENT
Start: 2017-12-18 | End: 2017-12-31

## 2017-12-18 RX ORDER — TRAMADOL HYDROCHLORIDE 50 MG/1
1 TABLET ORAL
Qty: 56 | Refills: 0 | OUTPATIENT
Start: 2017-12-18 | End: 2017-12-31

## 2017-12-18 RX ORDER — QUETIAPINE FUMARATE 200 MG/1
1 TABLET, FILM COATED ORAL
Qty: 0 | Refills: 0 | COMMUNITY

## 2017-12-18 RX ORDER — TRAMADOL HYDROCHLORIDE 50 MG/1
1 TABLET ORAL
Qty: 56 | Refills: 0
Start: 2017-12-18 | End: 2017-12-31

## 2017-12-18 RX ORDER — ACETAMINOPHEN 500 MG
0 TABLET ORAL
Qty: 0 | Refills: 0 | COMMUNITY

## 2017-12-18 RX ORDER — WARFARIN SODIUM 2.5 MG/1
1 TABLET ORAL
Qty: 0 | Refills: 0 | COMMUNITY

## 2017-12-18 RX ORDER — WARFARIN SODIUM 2.5 MG/1
1 TABLET ORAL
Qty: 2 | Refills: 0
Start: 2017-12-18 | End: 2017-12-19

## 2017-12-18 RX ORDER — ACETAMINOPHEN 500 MG
2 TABLET ORAL
Qty: 56 | Refills: 0
Start: 2017-12-18 | End: 2017-12-31

## 2017-12-18 RX ORDER — ASPIRIN/CALCIUM CARB/MAGNESIUM 324 MG
1 TABLET ORAL
Qty: 0 | Refills: 0 | COMMUNITY

## 2017-12-18 RX ADMIN — QUETIAPINE FUMARATE 50 MILLIGRAM(S): 200 TABLET, FILM COATED ORAL at 05:23

## 2017-12-18 RX ADMIN — BISOPROLOL FUMARATE 2.5 MILLIGRAM(S): 10 TABLET, FILM COATED ORAL at 05:23

## 2017-12-18 RX ADMIN — BISOPROLOL FUMARATE 2.5 MILLIGRAM(S): 10 TABLET, FILM COATED ORAL at 17:47

## 2017-12-18 RX ADMIN — ENOXAPARIN SODIUM 60 MILLIGRAM(S): 100 INJECTION SUBCUTANEOUS at 05:23

## 2017-12-18 RX ADMIN — QUETIAPINE FUMARATE 50 MILLIGRAM(S): 200 TABLET, FILM COATED ORAL at 17:47

## 2017-12-18 NOTE — PROGRESS NOTE ADULT - PROBLEM SELECTOR PLAN 4
Monitor vitals.  C/W Bisoprolol

## 2017-12-18 NOTE — PROGRESS NOTE ADULT - PROBLEM SELECTOR PLAN 5
Unclear why patient on debrox.  Med rec from [facility].
Unclear why patient on debrox.  Med rec from [facility].
Unclear why patient on debrox.  Med rec from [facility].
Unclear why patient on debrox.  Med rec from [facility].

## 2017-12-18 NOTE — PROGRESS NOTE ADULT - PROBLEM SELECTOR PROBLEM 2
Subconjunctival hemorrhage, right

## 2017-12-18 NOTE — PROGRESS NOTE ADULT - SUBJECTIVE AND OBJECTIVE BOX
Follow up: HTN, AF    HPI:  81 yo woman with PMH of HTN, AFib on coumadin, osteoporosis presents from [facility] assisted living in setting of abnormal lab results. Patient on coumadin. Patient with eye redness, she can not recall how long the redness has been. Denies eye pain, blurry vision (wears glasses at baseline). Patient denies gum bleeding, epistaxis, hematuria, melena, BRBPR. Patient without acute complaints at this time. Patient has been receiving coumadin 2.5 and 5 mg every other day at assisted living facility.  Of note, Patient recently moved here from [place] and just moved to the [facility] a few weeks ago. Patient has reported short-term memory  loss and documented dementia. (14 Dec 2017 23:09)    She is awake and alert, ambulating without new complaints. She reports no chest pain or dyspnea.      PAST MEDICAL & SURGICAL HISTORY:  History of Renal Calculi: no treatment; assymptomatic.  Murmur: MVP  Afib: paroxysmal.  ( 2008).  treated with metoprolol;  HTN (Hypertension)  Hypercholesterolemia  Adult Hypothyroidism: dx:  9/09.  meds X 3 months.  off meds since 12/09.  S/P Colonoscopy: excision &quot;benign&quot; polyps.  ( 2004)  S/P Appendectomy: age 19.      MEDICATIONS  (STANDING):  bisoprolol   Tablet 2.5 milliGRAM(s) Oral two times a day  enoxaparin Injectable 60 milliGRAM(s) SubCutaneous every 12 hours  QUEtiapine 50 milliGRAM(s) Oral two times a day      Vital Signs Last 24 Hrs  T(C): 36.6 (17 Dec 2017 04:48), Max: 36.6 (16 Dec 2017 20:16)  T(F): 97.8 (17 Dec 2017 04:48), Max: 97.9 (16 Dec 2017 20:16)  HR: 74 (17 Dec 2017 04:48) (74 - 87)  BP: 114/73 (17 Dec 2017 04:48) (114/73 - 141/78)  BP(mean): --  RR: 17 (17 Dec 2017 04:48) (17 - 18)  SpO2: 98% (17 Dec 2017 04:48) (96% - 98%)    I&O's Summary    16 Dec 2017 07:01  -  17 Dec 2017 07:00  --------------------------------------------------------  IN: 960 mL / OUT: 0 mL / NET: 960 mL        PHYSICAL EXAM:    Constitutional: NAD, awake and alert, well-developed  Eyes:    Pupils round, no lesions  ENMT: no exudate or erythema  Pulmonary: Non-labored, breath sounds are clear bilaterally, No wheezing, rales or rhonchi  Cardiovascular: PMI not palpable irreg normal S1 and S2, no murmurs, rubs, gallops or clicks  Gastrointestinal: Bowel Sounds present, soft, nontender.   Lymph: No cervical lymphadenopathy.  Neurological: Alert, no focal deficits  Skin: No rashes.  No cyanosis.  Psych:  Mood & affect appropriate   Ext: No lower ext edema                                14.9   10.67 )-----------( 310      ( 15 Dec 2017 10:48 )             44.9     12-15    140  |  104  |  17  ----------------------------<  115<H>  4.7   |  23  |  0.94    Ca    9.9      15 Dec 2017 10:48  Phos  3.0     12-15  Mg     2.1     12-15    < from: 12 Lead ECG (12.15.17 @ 00:54) >    Ventricular Rate 90 BPM    Atrial Rate 227 BPM    QRS Duration 76 ms     ms    QTc 445 ms    R Axis 7 degrees    T Axis 34 degrees    Diagnosis Line ATRIAL FIBRILLATION  ABNORMAL ECG    < end of copied text >
Follow up: HTN, AF    HPI:  81 yo woman with PMH of HTN, AFib on coumadin, osteoporosis presents from [facility] assisted living in setting of abnormal lab results. Patient on coumadin. Patient with eye redness, she can not recall how long the redness has been. Denies eye pain, blurry vision (wears glasses at baseline). Patient denies gum bleeding, epistaxis, hematuria, melena, BRBPR. Patient without acute complaints at this time. Patient has been receiving coumadin 2.5 and 5 mg every other day at assisted living facility.  Of note, Patient recently moved here from [place] and just moved to the [facility] a few weeks ago. Patient has reported short-term memory  loss and documented dementia. (14 Dec 2017 23:09)  She is ambulating in the viera this morning without difficulty. She reports no chest pain, dyspnea, palpitations, lightheadedness      PAST MEDICAL & SURGICAL HISTORY:  History of Renal Calculi: no treatment; assymptomatic.  Murmur: MVP  Afib: paroxysmal.  ( 2008).  treated with metoprolol;  HTN (Hypertension)  Hypercholesterolemia  Adult Hypothyroidism: dx:  9/09.  meds X 3 months.  off meds since 12/09.  S/P Colonoscopy: excision &quot;benign&quot; polyps.  ( 2004)  S/P Appendectomy: age 19.      MEDICATIONS  (STANDING):  bisoprolol   Tablet 2.5 milliGRAM(s) Oral two times a day  enoxaparin Injectable 60 milliGRAM(s) SubCutaneous every 12 hours  QUEtiapine 50 milliGRAM(s) Oral two times a day  warfarin 5 milliGRAM(s) Oral once    Vital Signs Last 24 Hrs  T(C): 36.3 (16 Dec 2017 11:23), Max: 36.4 (15 Dec 2017 21:49)  T(F): 97.3 (16 Dec 2017 11:23), Max: 97.5 (15 Dec 2017 21:49)  HR: 80 (16 Dec 2017 11:23) (80 - 87)  BP: 141/78 (16 Dec 2017 11:23) (108/69 - 141/78)  BP(mean): --  RR: 18 (16 Dec 2017 11:23) (17 - 18)  SpO2: 96% (16 Dec 2017 11:23) (96% - 96%)    I&O's Summary    15 Dec 2017 07:01  -  16 Dec 2017 07:00  --------------------------------------------------------  IN: 1360 mL / OUT: 0 mL / NET: 1360 mL    16 Dec 2017 07:01  -  16 Dec 2017 14:49  --------------------------------------------------------  IN: 600 mL / OUT: 0 mL / NET: 600 mL        PHYSICAL EXAM:    Constitutional: NAD, awake and alert, well-developed  Eyes:   Pupils round, no lesions  ENMT: no exudate or erythema  Pulmonary: Non-labored, breath sounds are clear bilaterally, No wheezing, rales or rhonchi  Cardiovascular: PMI not palpable ireg normal S1 and S2, no murmurs, rubs, gallops or clicks  Gastrointestinal: Bowel Sounds present, soft, nontender.   Lymph: No cervical lymphadenopathy.  Neurological: Alert, no focal deficits  Skin: No rashes.  No cyanosis.  Psych:  Mood & affect appropriate   Ext: No lower ext edema                                14.9   10.67 )-----------( 310      ( 15 Dec 2017 10:48 )             44.9     12-15    140  |  104  |  17  ----------------------------<  115<H>  4.7   |  23  |  0.94    Ca    9.9      15 Dec 2017 10:48  Phos  3.0     12-15  Mg     2.1     12-15  < from: 12 Lead ECG (12.15.17 @ 00:54) >    Ventricular Rate 90 BPM    Atrial Rate 227 BPM    QRS Duration 76 ms     ms    QTc 445 ms    R Axis 7 degrees    T Axis 34 degrees    Diagnosis Line ATRIAL FIBRILLATION  ABNORMAL ECG    < end of copied text >
Patient is a 82y old  Female who presents with a chief complaint of elevated INR (15 Dec 2017 10:32)      SUBJECTIVE / OVERNIGHT EVENTS: no new c/o    MEDICATIONS  (STANDING):  bisoprolol   Tablet 2.5 milliGRAM(s) Oral two times a day  enoxaparin Injectable 60 milliGRAM(s) SubCutaneous every 12 hours  QUEtiapine 50 milliGRAM(s) Oral two times a day  warfarin 5 milliGRAM(s) Oral once    MEDICATIONS  (PRN):      Vital Signs Last 24 Hrs  T(F): 98.1 (12-17-17 @ 13:52), Max: 98.1 (12-17-17 @ 13:52)  HR: 79 (12-17-17 @ 13:52) (74 - 87)  BP: 112/74 (12-17-17 @ 13:52) (112/74 - 135/76)  RR: 18 (12-17-17 @ 13:52) (17 - 18)  SpO2: 99% (12-17-17 @ 13:52) (98% - 99%)  Telemetry:   CAPILLARY BLOOD GLUCOSE        I&O's Summary    16 Dec 2017 07:01  -  17 Dec 2017 07:00  --------------------------------------------------------  IN: 960 mL / OUT: 0 mL / NET: 960 mL    17 Dec 2017 07:01  -  17 Dec 2017 15:12  --------------------------------------------------------  IN: 600 mL / OUT: 0 mL / NET: 600 mL        PHYSICAL EXAM:  GENERAL: NAD, well-developed  HEAD:  Atraumatic, Normocephalic  EYES: EOMI, PERRLA, conjunctiva and sclera clear  NECK: Supple, No JVD  CHEST/LUNG: Clear to auscultation bilaterally; No wheeze  HEART: Regular rate and rhythm; No murmurs, rubs, or gallops  ABDOMEN: Soft, Nontender, Nondistended; Bowel sounds present  EXTREMITIES:  2+ Peripheral Pulses, No clubbing, cyanosis, or edema  PSYCH: AAOx3  NEUROLOGY: non-focal  SKIN: No rashes or lesions    LABS:          PT/INR - ( 17 Dec 2017 08:37 )   PT: 15.1 sec;   INR: 1.33 ratio                   RADIOLOGY & ADDITIONAL TESTS:    Imaging Personally Reviewed:    Consultant(s) Notes Reviewed:      Care Discussed with Consultants/Other Providers:
Patient is a 82y old  Female who presents with a chief complaint of elevated INR (15 Dec 2017 10:32)      SUBJECTIVE / OVERNIGHT EVENTS: no new c/o, INR therapeutic    MEDICATIONS  (STANDING):  bisoprolol   Tablet 2.5 milliGRAM(s) Oral two times a day  QUEtiapine 50 milliGRAM(s) Oral two times a day    MEDICATIONS  (PRN):      Vital Signs Last 24 Hrs  T(F): 98.4 (12-18-17 @ 13:58), Max: 98.4 (12-18-17 @ 13:58)  HR: 78 (12-18-17 @ 13:58) (78 - 94)  BP: 119/73 (12-18-17 @ 13:58) (111/66 - 119/73)  RR: 18 (12-18-17 @ 13:58) (18 - 18)  SpO2: 98% (12-18-17 @ 13:58) (96% - 98%)  Telemetry:   CAPILLARY BLOOD GLUCOSE        I&O's Summary    17 Dec 2017 07:01  -  18 Dec 2017 07:00  --------------------------------------------------------  IN: 1080 mL / OUT: 0 mL / NET: 1080 mL    18 Dec 2017 07:01  -  18 Dec 2017 14:28  --------------------------------------------------------  IN: 720 mL / OUT: 0 mL / NET: 720 mL        PHYSICAL EXAM:  GENERAL: NAD, well-developed  HEAD:  Atraumatic, Normocephalic  EYES: EOMI, PERRLA, conjunctiva and sclera clear  NECK: Supple, No JVD  CHEST/LUNG: Clear to auscultation bilaterally; No wheeze  HEART: Regular rate and rhythm; No murmurs, rubs, or gallops  ABDOMEN: Soft, Nontender, Nondistended; Bowel sounds present  EXTREMITIES:  2+ Peripheral Pulses, No clubbing, cyanosis, or edema  PSYCH: AAOx3  NEUROLOGY: non-focal  SKIN: No rashes or lesions    LABS:                        14.1   7.29  )-----------( 263      ( 18 Dec 2017 08:32 )             42.5     12-18    140  |  103  |  22  ----------------------------<  93  4.5   |  25  |  1.00    Ca    9.4      18 Dec 2017 08:45      PT/INR - ( 18 Dec 2017 08:26 )   PT: 25.4 sec;   INR: 2.21 ratio                   RADIOLOGY & ADDITIONAL TESTS:    Imaging Personally Reviewed:    Consultant(s) Notes Reviewed:      Care Discussed with Consultants/Other Providers:
Patient is a 82y old  Female who presents with a chief complaint of elevated INR (15 Dec 2017 10:32)      SUBJECTIVE / OVERNIGHT EVENTS: no new c/o, non progressing right subconjunctival hemorrhage. receieved 5 mg IV Vit K last night. INR now 1.75, subtherapeutic    MEDICATIONS  (STANDING):  bisoprolol   Tablet 2.5 milliGRAM(s) Oral two times a day  enoxaparin Injectable 60 milliGRAM(s) SubCutaneous every 12 hours  QUEtiapine 50 milliGRAM(s) Oral two times a day  warfarin 3 milliGRAM(s) Oral once    MEDICATIONS  (PRN):      Vital Signs Last 24 Hrs  T(F): 98.3 (12-15-17 @ 11:50), Max: 98.3 (12-15-17 @ 11:50)  HR: 89 (12-15-17 @ 11:50) (85 - 98)  BP: 144/83 (12-15-17 @ 11:50) (128/82 - 163/87)  RR: 18 (12-15-17 @ 11:50) (17 - 18)  SpO2: 96% (12-15-17 @ 11:50) (96% - 99%)  Telemetry:   CAPILLARY BLOOD GLUCOSE        I&O's Summary    15 Dec 2017 07:01  -  15 Dec 2017 14:07  --------------------------------------------------------  IN: 940 mL / OUT: 0 mL / NET: 940 mL        PHYSICAL EXAM:  GENERAL: NAD, well-developed  HEAD:  Atraumatic, Normocephalic  EYES: EOMI, PERRLA, conjunctiva and sclera clear  NECK: Supple, No JVD  CHEST/LUNG: Clear to auscultation bilaterally; No wheeze  HEART: Regular rate and rhythm; No murmurs, rubs, or gallops  ABDOMEN: Soft, Nontender, Nondistended; Bowel sounds present  EXTREMITIES:  2+ Peripheral Pulses, No clubbing, cyanosis, or edema  PSYCH: AAOx3  NEUROLOGY: non-focal  SKIN: No rashes or lesions    LABS:                        14.9   10.67 )-----------( 310      ( 15 Dec 2017 10:48 )             44.9     12-15    140  |  104  |  17  ----------------------------<  115<H>  4.7   |  23  |  0.94    Ca    9.9      15 Dec 2017 10:48  Phos  3.0     12-15  Mg     2.1     12-15    TPro  6.9  /  Alb  3.7  /  TBili  0.2  /  DBili  x   /  AST  15  /  ALT  11  /  AlkPhos  149<H>  12-14    PT/INR - ( 15 Dec 2017 10:48 )   PT: 20.0 sec;   INR: 1.75 ratio         PTT - ( 15 Dec 2017 10:48 )  PTT:37.7 sec          RADIOLOGY & ADDITIONAL TESTS:    Imaging Personally Reviewed:    Consultant(s) Notes Reviewed:      Care Discussed with Consultants/Other Providers:
[facility] - Guerrero Howard, Marlen, Maximo, Shelli, Jason Tavera  Office Number:  [phone]    Patient resting comfortably in bed in NAD.  Laying flat with no respiratory distress.  No complaints of chest pain, dyspnea, palpitations, PND, or orthopnea.  Feeling well and wants to go home    ROS: negative unless otherwise mentioned.    Telemetry:  Not on tele    MEDICATIONS  (STANDING):  bisoprolol   Tablet 2.5 milliGRAM(s) Oral two times a day  enoxaparin Injectable 60 milliGRAM(s) SubCutaneous every 12 hours  QUEtiapine 50 milliGRAM(s) Oral two times a day    MEDICATIONS  (PRN):      Allergies    No Known Allergies    Intolerances        Vital Signs Last 24 Hrs  T(C): 36.4 (18 Dec 2017 04:19), Max: 36.8 (17 Dec 2017 20:31)  T(F): 97.5 (18 Dec 2017 04:19), Max: 98.2 (17 Dec 2017 20:31)  HR: 80 (18 Dec 2017 04:19) (79 - 94)  BP: 111/66 (18 Dec 2017 04:19) (111/66 - 113/76)  BP(mean): --  RR: 18 (18 Dec 2017 04:19) (18 - 18)  SpO2: 97% (18 Dec 2017 04:19) (96% - 99%)    I&O's Summary    17 Dec 2017 07:01  -  18 Dec 2017 07:00  --------------------------------------------------------  IN: 1080 mL / OUT: 0 mL / NET: 1080 mL        ON EXAM:    Constitutional: NAD, awake and alert, well-developed  Eyes:    Pupils round, no lesions  ENMT: no exudate or erythema  Pulmonary: Non-labored, breath sounds are clear bilaterally, No wheezing, rales or rhonchi  Cardiovascular: PMI not palpable irreg normal S1 and S2, no murmurs, rubs, gallops or clicks  Gastrointestinal: Bowel Sounds present, soft, nontender.   Lymph: No cervical lymphadenopathy.  Neurological: Alert, no focal deficits  Skin: No rashes.  No cyanosis.  Psych:  Mood & affect appropriate   Ext: No lower ext edema    LABS: All Labs Reviewed:                        14.1   7.29  )-----------( 263      ( 18 Dec 2017 08:32 )             42.5                         14.9   10.67 )-----------( 310      ( 15 Dec 2017 10:48 )             44.9     15 Dec 2017 10:48    140    |  104    |  17     ----------------------------<  115    4.7     |  23     |  0.94     Ca    9.9        15 Dec 2017 10:48  Phos  3.0       15 Dec 2017 10:48  Mg     2.1       15 Dec 2017 10:48      PT/INR - ( 18 Dec 2017 08:26 )   PT: 25.4 sec;   INR: 2.21 ratio               Blood Culture:
Patient is a 82y old  Female who presents with a chief complaint of elevated INR (15 Dec 2017 10:32)      SUBJECTIVE / OVERNIGHT EVENTS: no new c/o    MEDICATIONS  (STANDING):  bisoprolol   Tablet 2.5 milliGRAM(s) Oral two times a day  enoxaparin Injectable 60 milliGRAM(s) SubCutaneous every 12 hours  QUEtiapine 50 milliGRAM(s) Oral two times a day  warfarin 5 milliGRAM(s) Oral once    MEDICATIONS  (PRN):      Vital Signs Last 24 Hrs  T(F): 97.3 (12-16-17 @ 11:23), Max: 97.5 (12-15-17 @ 21:49)  HR: 80 (12-16-17 @ 11:23) (80 - 87)  BP: 141/78 (12-16-17 @ 11:23) (108/69 - 141/78)  RR: 18 (12-16-17 @ 11:23) (17 - 18)  SpO2: 96% (12-16-17 @ 11:23) (96% - 96%)  Telemetry:   CAPILLARY BLOOD GLUCOSE        I&O's Summary    15 Dec 2017 07:01  -  16 Dec 2017 07:00  --------------------------------------------------------  IN: 1360 mL / OUT: 0 mL / NET: 1360 mL    16 Dec 2017 07:01  -  16 Dec 2017 16:14  --------------------------------------------------------  IN: 600 mL / OUT: 0 mL / NET: 600 mL        PHYSICAL EXAM:  GENERAL: NAD, well-developed  HEAD:  Atraumatic, Normocephalic  EYES: EOMI, PERRLA, conjunctiva and sclera clear  NECK: Supple, No JVD  CHEST/LUNG: Clear to auscultation bilaterally; No wheeze  HEART: Regular rate and rhythm; No murmurs, rubs, or gallops  ABDOMEN: Soft, Nontender, Nondistended; Bowel sounds present  EXTREMITIES:  2+ Peripheral Pulses, No clubbing, cyanosis, or edema  PSYCH: AAOx3  NEUROLOGY: non-focal  SKIN: No rashes or lesions    LABS:                        14.9   10.67 )-----------( 310      ( 15 Dec 2017 10:48 )             44.9     12-15    140  |  104  |  17  ----------------------------<  115<H>  4.7   |  23  |  0.94    Ca    9.9      15 Dec 2017 10:48  Phos  3.0     12-15  Mg     2.1     12-15      PT/INR - ( 16 Dec 2017 06:32 )   PT: 13.5 sec;   INR: 1.24 ratio         PTT - ( 15 Dec 2017 10:48 )  PTT:37.7 sec          RADIOLOGY & ADDITIONAL TESTS:    Imaging Personally Reviewed:    Consultant(s) Notes Reviewed:      Care Discussed with Consultants/Other Providers:

## 2017-12-18 NOTE — PROGRESS NOTE ADULT - ATTENDING COMMENTS
goals of care d/w ptn, full code, x 30 min

## 2017-12-18 NOTE — PROGRESS NOTE ADULT - ASSESSMENT
83 yo woman with PMH of HTN, AFib on coumadin, osteoporosis [facility] in setting of supratherapeutic INR.
81 yo woman with PMH of HTN, AFib on coumadin, osteoporosis [facility] setting of supratherapeutic INR.
81 yo woman with PMH of HTN, AFib on coumadin, osteoporosis [facility].
82 year old woman with atrial fibrillation on AC with Coumadin, hypertension, carotid disease s/p CEA presents with hyphema and high INR, s/p vitamin K.  She should continue Coumadin with a goal INR of 2-3.  We are happy to manage her INR in our office if need be.  There is no evidence of life threatening bleeding . Continue bisoprolol at current dose.  She will follow with Dr. Gee per routine as an outpatient.
82 year old woman with atrial fibrillation on AC with Coumadin, hypertension, carotid disease s/p CEA presents with hyphema and high INR, s/p vitamin K.  She should continue Coumadin with a goal INR of 2-3.  We are happy to manage her INR in our office if need be.  There is no evidence of life threatening bleeding . Continue bisoprolol at current dose.  She will follow with Dr. Gee per routine as an outpatient.
83 yo woman with PMH of HTN, AFib on coumadin, osteoporosis [facility] supratherapeutic [facility].
Mrs. Jackson is a 82 year old woman with atrial fibrillation on AC with Coumadin, hypertension, carotid disease s/p CEA presents with hyphema and high INR, s/p vitamin K.   She should continue Coumadin with a goal INR of 2-3.  We are happy to manage her INR in our office if need be.  There is no evidence of life threatening bleeding and her hemoglobin is stable. Continue bisoprolol at current dose.  EKG is AF with no sign of ischemia. She is hemodynamically stable. She will follow with Dr. Gee per routine as an outpatient.

## 2017-12-18 NOTE — PROGRESS NOTE ADULT - PROBLEM SELECTOR PLAN 3
EKG ordered:  C/W Bisoprolol
EKG ordered:  C/W Bisoprolol
EKG ordered: AFIB  C/W Bisoprolol
EKG ordered: AFIB  C/W Bisoprolol

## 2017-12-18 NOTE — PROGRESS NOTE ADULT - PROBLEM SELECTOR PLAN 1
received IV Vit K last night, now subtherapeutic.  on sc Lovenox and coumadin  restarted. awaiting INR 2-3
receieved IV Vit K last night, now subtherapeutic. place on sc Lovenox and restart coumadin.
received IV Vit K the night of admission, now INR back to being therapeutic.  sc Lovenox (has chronic AFib) DCed and coumadin  to cont on outptn basis, rpt PT/INR in 3 days. Cardiology to follow on outptn basis .
received IV Vit K the night of admission, now subtherapeutic.  on sc Lovenox (has chronic AFib) and coumadin  restarted. awaiting INR 2-3

## 2017-12-22 LAB — INR PPP: 2.2

## 2017-12-28 LAB — INR PPP: 2

## 2018-01-06 LAB — INR PPP: 2

## 2018-01-11 LAB — INR PPP: 1.8

## 2018-01-17 ENCOUNTER — APPOINTMENT (OUTPATIENT)
Dept: CARDIOLOGY | Facility: CLINIC | Age: 83
End: 2018-01-17
Payer: MEDICARE

## 2018-01-17 ENCOUNTER — NON-APPOINTMENT (OUTPATIENT)
Age: 83
End: 2018-01-17

## 2018-01-17 VITALS
OXYGEN SATURATION: 97 % | DIASTOLIC BLOOD PRESSURE: 78 MMHG | HEART RATE: 83 BPM | HEIGHT: 62 IN | SYSTOLIC BLOOD PRESSURE: 120 MMHG | BODY MASS INDEX: 23.92 KG/M2 | WEIGHT: 130 LBS

## 2018-01-17 PROCEDURE — 99214 OFFICE O/P EST MOD 30 MIN: CPT

## 2018-01-17 PROCEDURE — 93000 ELECTROCARDIOGRAM COMPLETE: CPT

## 2018-01-22 LAB — INR PPP: 1.5

## 2018-01-25 LAB — INR PPP: 2

## 2018-02-01 LAB — INR PPP: 1.9

## 2018-02-15 LAB — INR PPP: 3.9

## 2018-02-22 LAB — INR PPP: 4.1

## 2018-02-28 NOTE — DISCHARGE NOTE ADULT - APPOINTMENTS. PLEASE FOLLOW UP WITH YOUR DOCTOR WITHIN 3 DAYS OF DISCHARGE TO SCHEDULE YOUR NEXT BLOOD TEST.
Subjective:  Joe Christian is a 48 y.o. female who presents to the office today complaining of pain R big toe joint. Symptoms began month(s) ago. Patient relates pain is Present. Pain is rated 0- 6 out of 10 and is described as intermittent, moderate. Worse with increased activity and wrong shoes. Pt would like to see what can be done to solve her issue. Treatments prior to today's visit include: none. Currently denies F/C/N/V. No Known Allergies    Past Medical History:   Diagnosis Date    Depression     Seasonal allergies        Prior to Admission medications    Medication Sig Start Date End Date Taking? Authorizing Provider   citalopram (CELEXA) 40 MG tablet Take 1 tablet by mouth daily 18  Yes Christina Omer MD   Multiple Vitamins-Minerals (THERAPEUTIC MULTIVITAMIN-MINERALS) tablet Take 1 tablet by mouth daily   Yes Historical Provider, MD   cetirizine (ZYRTEC) 10 MG tablet Take 10 mg by mouth daily   Yes Historical Provider, MD   citalopram (CELEXA) 20 MG tablet Take 2.5 tablets by mouth daily 18  Yes Christina Omer MD       Past Surgical History:   Procedure Laterality Date     SECTION      HYSTERECTOMY, TOTAL ABDOMINAL  2013    fibroids       Family History   Problem Relation Age of Onset    Heart Disease Mother     Diabetes Mother     High Cholesterol Mother     Diabetes Father     Anxiety Disorder Daughter        Social History   Substance Use Topics    Smoking status: Never Smoker    Smokeless tobacco: Never Used    Alcohol use No       Review of Systems: All 12 systems reviewed and pertinent positives noted above. Lower Extremity Physical Examination:     Vitals:   Vitals:    18 1410   BP: 120/74   Pulse: 72   Resp: 20     General: AAO x 3 in NAD.      Vascular: DP and PT pulses palpable 2/4, bilateral.  CFT <3 seconds, bilateral.  Hair growth present to the level of the digits, bilateral.  Edema absent, bilateral.  Varicosities absent, bilateral. Erythema absent, bilateral. Distal Rubor absent bilateral.  Temperature within normal limits bilateral. Hyperpigmentation absent bilateral. No atrophic skin. Neurological: Sensation intact to light touch to level of digits, bilateral.  Protective sensation intact 10/10 sites via 5.07/10g Patton-Mitra Monofilament, bilateral.  negative Tinel's, bilateral.  negative Valleix sign, bilateral.  Vibratory intact bilateral.  Reflexes Decreased bilateral.  Paresthesias negative. Dysthesias negative. Sharp/dull intact bilateral.  Musculoskeletal: Muscle strength 5/5, Bilateral.  Pain present upon palpation of dorsal 1st MPJ, Right. within normal limits medial longitudinal arch, Bilateral.  Ankle ROM within normal limits,Bilateral.  1st MPJ ROM decreased, Bilateral.  Dorsally contracted digits absent digits none, Bilateral. Other foot deformities palpable exostosis dorsal R 1st MPJ. Integument: Warm, dry, supple, bilateral.  Open lesion absent, bilateral.  Interdigital maceration absent to web spaces bilateral.  Nails within normal limits. Fissures absent, bilateral. Hyperkeratotic tissue is absent. Asessment: Patient is a 48 y.o. female with:   1. Hallux limitus of right foot     2. Inflammation of foot joint         Plan: Patient examined and evaluated. Current condition and treatment options discussed in detail. X rays reviewed with the pt in detail. All questions answered. Pt requesting sx intervention at this time for the painful R great toe joint. Procedure will be a cheilectomy right foot. Patient was educated on the pre-op, anesthesia, and post op course. Risks and complications were discussed such as wound infection, wound dehiscence, re-ocurrence of deformity, hematoma/seroma, neurovascular injury, and RSD. All questions were answered and patient should call back quicker if any further questions arise. Patient was also set up for pre-op clearance from their PCP.   Contact office with any questions/problems/concerns.   RTC in PRN for sx intervention Statement Selected

## 2018-03-16 LAB — INR PPP: 2.6

## 2018-03-20 ENCOUNTER — CHART COPY (OUTPATIENT)
Age: 83
End: 2018-03-20

## 2018-03-23 LAB — INR PPP: 1.7

## 2018-04-13 LAB — INR PPP: 3

## 2018-04-30 LAB — INR PPP: 3.5

## 2018-05-10 LAB — INR PPP: 2.1

## 2018-06-07 LAB — INR PPP: 2.9

## 2018-07-06 LAB — INR PPP: 3.2

## 2018-07-19 LAB — INR PPP: 3.5

## 2018-07-20 ENCOUNTER — EMERGENCY (EMERGENCY)
Facility: HOSPITAL | Age: 83
LOS: 1 days | Discharge: HOME | End: 2018-07-20
Attending: EMERGENCY MEDICINE
Payer: MEDICARE

## 2018-07-20 VITALS
OXYGEN SATURATION: 95 % | SYSTOLIC BLOOD PRESSURE: 123 MMHG | TEMPERATURE: 98 F | HEART RATE: 78 BPM | DIASTOLIC BLOOD PRESSURE: 55 MMHG | RESPIRATION RATE: 18 BRPM

## 2018-07-20 LAB
ALBUMIN SERPL ELPH-MCNC: 4.6 G/DL — SIGNIFICANT CHANGE UP (ref 3.3–5)
ALP SERPL-CCNC: 88 U/L — SIGNIFICANT CHANGE UP (ref 40–120)
ALT FLD-CCNC: 21 U/L — SIGNIFICANT CHANGE UP (ref 10–45)
ANION GAP SERPL CALC-SCNC: 10 MMOL/L — SIGNIFICANT CHANGE UP (ref 5–17)
APPEARANCE UR: CLEAR — SIGNIFICANT CHANGE UP
AST SERPL-CCNC: 83 U/L — HIGH (ref 10–40)
BASOPHILS # BLD AUTO: 0.1 K/UL — SIGNIFICANT CHANGE UP (ref 0–0.2)
BASOPHILS NFR BLD AUTO: 1.2 % — SIGNIFICANT CHANGE UP (ref 0–2)
BILIRUB SERPL-MCNC: 0.5 MG/DL — SIGNIFICANT CHANGE UP (ref 0.2–1.2)
BILIRUB UR-MCNC: NEGATIVE — SIGNIFICANT CHANGE UP
BUN SERPL-MCNC: 20 MG/DL — SIGNIFICANT CHANGE UP (ref 7–23)
CALCIUM SERPL-MCNC: 9.3 MG/DL — SIGNIFICANT CHANGE UP (ref 8.4–10.5)
CHLORIDE SERPL-SCNC: 103 MMOL/L — SIGNIFICANT CHANGE UP (ref 96–108)
CO2 SERPL-SCNC: 23 MMOL/L — SIGNIFICANT CHANGE UP (ref 22–31)
COLOR SPEC: YELLOW — SIGNIFICANT CHANGE UP
CREAT SERPL-MCNC: 0.91 MG/DL — SIGNIFICANT CHANGE UP (ref 0.5–1.3)
DIFF PNL FLD: ABNORMAL
EOSINOPHIL # BLD AUTO: 0.1 K/UL — SIGNIFICANT CHANGE UP (ref 0–0.5)
EOSINOPHIL NFR BLD AUTO: 0.8 % — SIGNIFICANT CHANGE UP (ref 0–6)
EPI CELLS # UR: SIGNIFICANT CHANGE UP /HPF
GLUCOSE SERPL-MCNC: 175 MG/DL — HIGH (ref 70–99)
GLUCOSE UR QL: 500 MG/DL
HCT VFR BLD CALC: 49.2 % — HIGH (ref 34.5–45)
HGB BLD-MCNC: 16.4 G/DL — HIGH (ref 11.5–15.5)
KETONES UR-MCNC: ABNORMAL
LEUKOCYTE ESTERASE UR-ACNC: ABNORMAL
LYMPHOCYTES # BLD AUTO: 1.5 K/UL — SIGNIFICANT CHANGE UP (ref 1–3.3)
LYMPHOCYTES # BLD AUTO: 19.4 % — SIGNIFICANT CHANGE UP (ref 13–44)
MAGNESIUM SERPL-MCNC: 2.2 MG/DL — SIGNIFICANT CHANGE UP (ref 1.6–2.6)
MCHC RBC-ENTMCNC: 31.5 PG — SIGNIFICANT CHANGE UP (ref 27–34)
MCHC RBC-ENTMCNC: 33.2 GM/DL — SIGNIFICANT CHANGE UP (ref 32–36)
MCV RBC AUTO: 94.9 FL — SIGNIFICANT CHANGE UP (ref 80–100)
MONOCYTES # BLD AUTO: 0.4 K/UL — SIGNIFICANT CHANGE UP (ref 0–0.9)
MONOCYTES NFR BLD AUTO: 4.9 % — SIGNIFICANT CHANGE UP (ref 2–14)
NEUTROPHILS # BLD AUTO: 5.8 K/UL — SIGNIFICANT CHANGE UP (ref 1.8–7.4)
NEUTROPHILS NFR BLD AUTO: 73.7 % — SIGNIFICANT CHANGE UP (ref 43–77)
NITRITE UR-MCNC: NEGATIVE — SIGNIFICANT CHANGE UP
PH UR: 6 — SIGNIFICANT CHANGE UP (ref 5–8)
PLATELET # BLD AUTO: 210 K/UL — SIGNIFICANT CHANGE UP (ref 150–400)
POTASSIUM SERPL-MCNC: 9 MMOL/L — CRITICAL HIGH (ref 3.5–5.3)
POTASSIUM SERPL-SCNC: 9 MMOL/L — CRITICAL HIGH (ref 3.5–5.3)
PROT SERPL-MCNC: 8.1 G/DL — SIGNIFICANT CHANGE UP (ref 6–8.3)
PROT UR-MCNC: 30 MG/DL
RBC # BLD: 5.19 M/UL — SIGNIFICANT CHANGE UP (ref 3.8–5.2)
RBC # FLD: 12.1 % — SIGNIFICANT CHANGE UP (ref 10.3–14.5)
RBC CASTS # UR COMP ASSIST: SIGNIFICANT CHANGE UP /HPF (ref 0–2)
SODIUM SERPL-SCNC: 136 MMOL/L — SIGNIFICANT CHANGE UP (ref 135–145)
SP GR SPEC: 1.03 — HIGH (ref 1.01–1.02)
UROBILINOGEN FLD QL: NEGATIVE — SIGNIFICANT CHANGE UP
WBC # BLD: 7.9 K/UL — SIGNIFICANT CHANGE UP (ref 3.8–10.5)
WBC # FLD AUTO: 7.9 K/UL — SIGNIFICANT CHANGE UP (ref 3.8–10.5)
WBC UR QL: SIGNIFICANT CHANGE UP /HPF (ref 0–5)

## 2018-07-20 PROCEDURE — 99284 EMERGENCY DEPT VISIT MOD MDM: CPT | Mod: 25,GC

## 2018-07-20 PROCEDURE — 93010 ELECTROCARDIOGRAM REPORT: CPT

## 2018-07-20 RX ORDER — SODIUM CHLORIDE 9 MG/ML
1000 INJECTION INTRAMUSCULAR; INTRAVENOUS; SUBCUTANEOUS ONCE
Qty: 0 | Refills: 0 | Status: COMPLETED | OUTPATIENT
Start: 2018-07-20 | End: 2018-07-20

## 2018-07-20 RX ORDER — CEFTRIAXONE 500 MG/1
1 INJECTION, POWDER, FOR SOLUTION INTRAMUSCULAR; INTRAVENOUS ONCE
Qty: 0 | Refills: 0 | Status: COMPLETED | OUTPATIENT
Start: 2018-07-20 | End: 2018-07-20

## 2018-07-20 RX ADMIN — SODIUM CHLORIDE 1000 MILLILITER(S): 9 INJECTION INTRAMUSCULAR; INTRAVENOUS; SUBCUTANEOUS at 23:45

## 2018-07-20 RX ADMIN — CEFTRIAXONE 100 GRAM(S): 500 INJECTION, POWDER, FOR SOLUTION INTRAMUSCULAR; INTRAVENOUS at 23:45

## 2018-07-20 NOTE — ED ADULT NURSE NOTE - OBJECTIVE STATEMENT
pt is a resident of the [facility]. as per pt daughter, "I was visiting my mother today and noticed that she was pale and sweaty all over. She also c/o be feeling dizzy. She had some of her medications changed about a week ago so I'm not sure if that's the reason." pt has PMH of dementia but pt AOx4 speaking in full complete sentences at present. pt denies any lightheadedness, chest pain, SOB, n/v/d, abd. pain, or any urinary symptoms at present.

## 2018-07-20 NOTE — ED PROVIDER NOTE - ATTENDING CONTRIBUTION TO CARE
81 yo F brought in by daughter today for eval. patient lives in [facility]. patient reports that she felt lightheaded throughout the day. today around 6pm her daughter came to visit her and reportedly patient was "sitting in her wheelchair slumped over, pale and sweaty". no suspected loc, as pt was arousable and talking, but daughter states confused.  no trauma. patient has no complaints at this time.   no reproted f/ch, headache, weakness/numbness, ataxia, cp, sob, abd pain, N/V/D, urinary complaints.   patietn treated with IVFs in the ER. 83 yo F brought in by daughter today for eval. patient lives in [facility]. patient reports that she felt lightheaded throughout the day. today around 6pm her daughter came to visit her and reportedly patient was "sitting in her wheelchair feeling lightheaded, was pale and sweaty". no LOC.  no trauma. patient has no complaints at this time and states that "it was nothing"  no reproted f/ch, headache, weakness/numbness, ataxia, cp, sob, abd pain, N/V/D, urinary complaints.   PE normal. lungs cta. abd soft and NT. neuro intact. cerebellar exam normal, normal gait  patietn treated with IVFs in the ER.  UA 2w0ith large LE and 6-10 WBC. treated with ceftriaxone for possible UTI. labs not actionable.   patient remained without complaints in the ER. VS stable. I had pt get up and ambulate and she did so without lightheadedness. delta trop negative.   I recommended further obs in the CDU for tele monitoring and TTE but patient and family declined. preferred to go home. daughter is healthcare proxy and understands that iwhtout further workup in the ER there could be failure to diagnosis a severe illness resulting in significant disability and death.   therefore, we called pts cardiologist and spoke to covering physician. he was okay with patient discharge and would arrange for further workupout as outpaitent. pt discharged with instructions to drink pleenty of fluids, keflex for possible uti, and f/u with cards and PMD in 1-2 days for rpt eval/further managmenet    The patient was discharged from the ED in stable condition. All results of today's workup were discussed with the patient and all questions/concerns were addressed. All discharge instructions were thoroughly discussed with the patient, as well as important warning signs and new/ worsening symptoms which should necessitate patient's immediate return to the ED. The patient is agreeable with discharge and expresses full understanding of all instructions given.

## 2018-07-20 NOTE — ED PROVIDER NOTE - PLAN OF CARE
1. Please follow up with Dr. Gee for this episode of near-syncope (passing out).  2. Take antibiotics as directed.  3. Please return to the ED should you have any new or worsening symptoms or have any new concerns.  4. Read all attached.

## 2018-07-20 NOTE — ED ADULT TRIAGE NOTE - CHIEF COMPLAINT QUOTE
as per pt she has been feeling generalized weakness today since after lunch, daughter says when she came to visit her she was altered, daughter last saw pt last week. no arm drift, slurred speech, facial droop. strength equal bilaterally in extremities.

## 2018-07-20 NOTE — ED PROVIDER NOTE - MEDICAL DECISION MAKING DETAILS
Ananya Magana, DO: 82F with lightheadedness with skin changes while sitting. Afebrile, but will check urine. Can not rule out cardiac etiology - will monitor overnight on tele for arrythmia. If negative w/u, patient to f/u with PMD for polypharmacy side effects.

## 2018-07-20 NOTE — ED PROVIDER NOTE - PHYSICAL EXAMINATION
Ananya Magana, DO:   Gen: Well appearing, NAD  Head: NCAT  HEENT: PERRL, MMM, normal conjunctiva, anicteric, neck supple  Lung: CTAB, no adventitious sounds  CV: RRR, no murmurs  Abd: soft, NTND, no rebound or guarding, no CVAT  MSK: No edema, no visible deformities  Neuro: 5/5 strength and normal sensation in all extremities. Ambulatory with stable gait.   Skin: Warm and dry, no evidence of rash  Psych: normal mood and affect

## 2018-07-20 NOTE — ED PROVIDER NOTE - PROGRESS NOTE DETAILS
Ananya Magana DO: discussed with covering physician for Dr. Gee - patient can be followed up in office for close follow up. Ananya Magana, DO: patient offered admission for telemonitoring & echo in AM vs. close follow up with cardiologist Dr. Gee. Daughter comfortable with plan for o/p workup. No telemetry events to date. Discussed with covering physician for Dr. Gee - patient can be followed up in office for close follow up. Pending repeat troponin.

## 2018-07-20 NOTE — ED PROVIDER NOTE - CARE PLAN
Principal Discharge DX:	Near syncope  Assessment and plan of treatment:	1. Please follow up with Dr. Gee for this episode of near-syncope (passing out).  2. Take antibiotics as directed.  3. Please return to the ED should you have any new or worsening symptoms or have any new concerns.  4. Read all attached. Principal Discharge DX:	Lightheadedness  Assessment and plan of treatment:	1. Please follow up with Dr. Gee for this episode of near-syncope (passing out).  2. Take antibiotics as directed.  3. Please return to the ED should you have any new or worsening symptoms or have any new concerns.  4. Read all attached.

## 2018-07-20 NOTE — ED PROVIDER NOTE - OBJECTIVE STATEMENT
Ananya Magana, DO: 82F with hx of Afib on Coumadin, HTN, [place] resident BIB daughter for here for lightheadedness. Noted by daughter to be diaphoretic, pale & lightheadedness. Recent change in medications - Seroquel 100 -> 200 mg ~1 week ago & Aricept changed to Namenda 5 mg for agitation & paranoia. No fevers,     Psych: Dr. John Ananya Magana, DO: 82F with hx of Afib on Coumadin, HTN, [place] resident BIB daughter for here for lightheadedness. Noted by daughter to be diaphoretic, pale & lightheadedness. Recent change in medications - Seroquel 100 -> 200 mg ~1 week ago & Aricept changed to Namenda 5 mg for agitation & paranoia. No fevers, urinary symptoms, cough, chest pain, SOB.     PMD: Dr. Denson  Cards: Dr. Gee  Psych: Dr. John Ananya Magana, DO: 82F with hx of Afib on Coumadin, HTN, [place] resident BIB daughter for here for lightheadedness. Noted by daughter to be diaphoretic, pale & lightheaded. Recent change in medications - Seroquel 100 -> 200 mg ~1 week ago & Aricept changed to Namenda 5 mg for agitation & paranoia. No fevers, urinary symptoms, cough, chest pain, SOB.     PMD: Dr. Denson  Cards: Dr. Gee  Psych: Dr. John

## 2018-07-20 NOTE — ED ADULT NURSE NOTE - CHPI ED SYMPTOMS NEG
no change in level of consciousness/no loss of consciousness/no nausea/no numbness/no blurred vision/no vomiting

## 2018-07-21 VITALS
TEMPERATURE: 98 F | SYSTOLIC BLOOD PRESSURE: 164 MMHG | OXYGEN SATURATION: 96 % | RESPIRATION RATE: 18 BRPM | DIASTOLIC BLOOD PRESSURE: 80 MMHG | HEART RATE: 82 BPM

## 2018-07-21 LAB
ANION GAP SERPL CALC-SCNC: 13 MMOL/L — SIGNIFICANT CHANGE UP (ref 5–17)
APTT BLD: 31.1 SEC — SIGNIFICANT CHANGE UP (ref 27.5–37.4)
BUN SERPL-MCNC: 22 MG/DL — SIGNIFICANT CHANGE UP (ref 7–23)
CALCIUM SERPL-MCNC: 9.4 MG/DL — SIGNIFICANT CHANGE UP (ref 8.4–10.5)
CHLORIDE SERPL-SCNC: 105 MMOL/L — SIGNIFICANT CHANGE UP (ref 96–108)
CO2 SERPL-SCNC: 24 MMOL/L — SIGNIFICANT CHANGE UP (ref 22–31)
CREAT SERPL-MCNC: 0.96 MG/DL — SIGNIFICANT CHANGE UP (ref 0.5–1.3)
CULTURE RESULTS: SIGNIFICANT CHANGE UP
GLUCOSE SERPL-MCNC: 129 MG/DL — HIGH (ref 70–99)
INR BLD: 1.68 RATIO — HIGH (ref 0.88–1.16)
POTASSIUM SERPL-MCNC: 4.4 MMOL/L — SIGNIFICANT CHANGE UP (ref 3.5–5.3)
POTASSIUM SERPL-SCNC: 4.4 MMOL/L — SIGNIFICANT CHANGE UP (ref 3.5–5.3)
PROTHROM AB SERPL-ACNC: 18.5 SEC — HIGH (ref 9.8–12.7)
SODIUM SERPL-SCNC: 142 MMOL/L — SIGNIFICANT CHANGE UP (ref 135–145)
SPECIMEN SOURCE: SIGNIFICANT CHANGE UP
TROPONIN T, HIGH SENSITIVITY RESULT: 7 NG/L — SIGNIFICANT CHANGE UP (ref 0–51)

## 2018-07-21 PROCEDURE — 87086 URINE CULTURE/COLONY COUNT: CPT

## 2018-07-21 PROCEDURE — 85027 COMPLETE CBC AUTOMATED: CPT

## 2018-07-21 PROCEDURE — 83735 ASSAY OF MAGNESIUM: CPT

## 2018-07-21 PROCEDURE — 84484 ASSAY OF TROPONIN QUANT: CPT

## 2018-07-21 PROCEDURE — 80048 BASIC METABOLIC PNL TOTAL CA: CPT

## 2018-07-21 PROCEDURE — 99284 EMERGENCY DEPT VISIT MOD MDM: CPT | Mod: 25

## 2018-07-21 PROCEDURE — 81001 URINALYSIS AUTO W/SCOPE: CPT

## 2018-07-21 PROCEDURE — 80053 COMPREHEN METABOLIC PANEL: CPT

## 2018-07-21 PROCEDURE — 85730 THROMBOPLASTIN TIME PARTIAL: CPT

## 2018-07-21 PROCEDURE — 85610 PROTHROMBIN TIME: CPT

## 2018-07-21 PROCEDURE — 93005 ELECTROCARDIOGRAM TRACING: CPT

## 2018-07-21 PROCEDURE — 96374 THER/PROPH/DIAG INJ IV PUSH: CPT

## 2018-07-21 RX ORDER — CEPHALEXIN 500 MG
1 CAPSULE ORAL
Qty: 20 | Refills: 0
Start: 2018-07-21 | End: 2018-07-30

## 2018-07-22 NOTE — ED POST DISCHARGE NOTE - DETAILS
7/22/18: Attempted to contact patient regarding ucx contamination results and discuss possible repeat. No answer. Phone kept ringing w/ no option to leave message. Will reattempt tomorrow. - Abdirashid Orellana PA-C 7/23 called patient unable to leave  - Dominique No PA-C left message with daughter to ask for patient to return call regarding results of urine cx contaminate. -Jossie Jimenes PA-C

## 2018-07-22 NOTE — ED POST DISCHARGE NOTE - OTHER COMMUNICATION
Spoke with pt, no urinary complaints, feeling better, advised continue ABX and follow up with PCP for repeat urine studies.  Farida LIZ

## 2018-07-26 LAB — INR PPP: 1.6

## 2018-08-02 ENCOUNTER — MESSAGE (OUTPATIENT)
Age: 83
End: 2018-08-02

## 2018-08-02 VITALS — HEIGHT: 62 IN | BODY MASS INDEX: 23.92 KG/M2 | HEART RATE: 83 BPM | WEIGHT: 130 LBS

## 2018-08-02 LAB — INR PPP: 1.7

## 2018-08-16 ENCOUNTER — CHART COPY (OUTPATIENT)
Age: 83
End: 2018-08-16

## 2018-08-16 LAB — INR PPP: 1.9

## 2018-08-31 LAB — INR PPP: 2.3

## 2018-09-05 ENCOUNTER — NON-APPOINTMENT (OUTPATIENT)
Age: 83
End: 2018-09-05

## 2018-09-05 ENCOUNTER — APPOINTMENT (OUTPATIENT)
Dept: CARDIOLOGY | Facility: CLINIC | Age: 83
End: 2018-09-05
Payer: MEDICARE

## 2018-09-05 VITALS
HEIGHT: 62 IN | OXYGEN SATURATION: 98 % | SYSTOLIC BLOOD PRESSURE: 161 MMHG | DIASTOLIC BLOOD PRESSURE: 83 MMHG | BODY MASS INDEX: 26.87 KG/M2 | WEIGHT: 146 LBS | HEART RATE: 93 BPM

## 2018-09-05 DIAGNOSIS — I10 ESSENTIAL (PRIMARY) HYPERTENSION: ICD-10-CM

## 2018-09-05 DIAGNOSIS — I65.29 OCCLUSION AND STENOSIS OF UNSPECIFIED CAROTID ARTERY: ICD-10-CM

## 2018-09-05 DIAGNOSIS — I48.2 CHRONIC ATRIAL FIBRILLATION: ICD-10-CM

## 2018-09-05 PROCEDURE — 93000 ELECTROCARDIOGRAM COMPLETE: CPT

## 2018-09-05 PROCEDURE — 99214 OFFICE O/P EST MOD 30 MIN: CPT

## 2018-09-05 RX ORDER — MEMANTINE HYDROCHLORIDE 5 MG/1
5 TABLET, FILM COATED ORAL TWICE DAILY
Refills: 0 | Status: ACTIVE | COMMUNITY
Start: 2018-09-05

## 2018-09-05 RX ORDER — QUETIAPINE FUMARATE 200 MG/1
200 TABLET ORAL TWICE DAILY
Refills: 0 | Status: ACTIVE | COMMUNITY
Start: 2017-12-15

## 2018-09-14 ENCOUNTER — RESULT REVIEW (OUTPATIENT)
Age: 83
End: 2018-09-14

## 2018-09-14 LAB — INR PPP: 2.7

## 2018-09-27 ENCOUNTER — RESULT REVIEW (OUTPATIENT)
Age: 83
End: 2018-09-27

## 2018-09-28 LAB — INR PPP: 2.7

## 2018-10-11 ENCOUNTER — RESULT REVIEW (OUTPATIENT)
Age: 83
End: 2018-10-11

## 2018-10-11 LAB — INR PPP: 3.1

## 2018-10-25 LAB — INR PPP: 2.4

## 2018-11-03 ENCOUNTER — APPOINTMENT (OUTPATIENT)
Dept: CARDIOLOGY | Facility: CLINIC | Age: 83
End: 2018-11-03
Payer: MEDICARE

## 2018-11-03 PROCEDURE — 93880 EXTRACRANIAL BILAT STUDY: CPT

## 2018-11-09 ENCOUNTER — RESULT REVIEW (OUTPATIENT)
Age: 83
End: 2018-11-09

## 2018-11-09 LAB — INR PPP: 2.4

## 2018-11-10 ENCOUNTER — APPOINTMENT (OUTPATIENT)
Dept: CARDIOLOGY | Facility: CLINIC | Age: 83
End: 2018-11-10
Payer: MEDICARE

## 2018-11-10 PROCEDURE — 93306 TTE W/DOPPLER COMPLETE: CPT

## 2018-11-15 LAB — INR PPP: 3.7

## 2018-11-21 NOTE — ED ADULT NURSE NOTE - DISCHARGE DATE/TIME
Recorded as Task  Date: 12/07/2017 01:59 PM, Created By: Jade Mccall  Task Name: 4. 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    CALLER'S RELATIONSHIP TO PATIENT: Anu  IF OTHER, NAME AND RELATIONSHIP: Mom     BEST NUMBER TO BE CONTACTED: [phone] if no answer leave a detailed message   ALTERNATIVE PHONE NUMBER: ___    Turnaround time given to caller:  \"\"THIS MESSAGE WILL BE SENT TO Dr. 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  Dec  7 2017  3:59PM CST     21-Jul-2018 03:37

## 2018-12-14 LAB — INR PPP: 3.3

## 2018-12-21 LAB
INR PPP: 1.8
PT BLD: 17.1

## 2019-01-03 ENCOUNTER — RESULT REVIEW (OUTPATIENT)
Age: 84
End: 2019-01-03

## 2019-01-04 ENCOUNTER — EMERGENCY (EMERGENCY)
Facility: HOSPITAL | Age: 84
LOS: 1 days | Discharge: HOME | End: 2019-01-04
Attending: EMERGENCY MEDICINE
Payer: MEDICARE

## 2019-01-04 VITALS
DIASTOLIC BLOOD PRESSURE: 54 MMHG | TEMPERATURE: 98 F | RESPIRATION RATE: 18 BRPM | SYSTOLIC BLOOD PRESSURE: 84 MMHG | WEIGHT: 145.06 LBS | HEIGHT: 62 IN | OXYGEN SATURATION: 94 % | HEART RATE: 93 BPM

## 2019-01-04 VITALS
RESPIRATION RATE: 17 BRPM | HEART RATE: 88 BPM | DIASTOLIC BLOOD PRESSURE: 76 MMHG | TEMPERATURE: 98 F | SYSTOLIC BLOOD PRESSURE: 150 MMHG | OXYGEN SATURATION: 100 %

## 2019-01-04 LAB
ALBUMIN SERPL ELPH-MCNC: 4.3 G/DL — SIGNIFICANT CHANGE UP (ref 3.3–5)
ALP SERPL-CCNC: 91 U/L — SIGNIFICANT CHANGE UP (ref 40–120)
ALT FLD-CCNC: 29 U/L — SIGNIFICANT CHANGE UP (ref 10–45)
ANION GAP SERPL CALC-SCNC: 12 MMOL/L — SIGNIFICANT CHANGE UP (ref 5–17)
APTT BLD: 22.1 SEC — LOW (ref 27.5–36.3)
AST SERPL-CCNC: 36 U/L — SIGNIFICANT CHANGE UP (ref 10–40)
BASOPHILS # BLD AUTO: 0 K/UL — SIGNIFICANT CHANGE UP (ref 0–0.2)
BASOPHILS NFR BLD AUTO: 0.3 % — SIGNIFICANT CHANGE UP (ref 0–2)
BILIRUB SERPL-MCNC: 0.3 MG/DL — SIGNIFICANT CHANGE UP (ref 0.2–1.2)
BUN SERPL-MCNC: 13 MG/DL — SIGNIFICANT CHANGE UP (ref 7–23)
CALCIUM SERPL-MCNC: 9.5 MG/DL — SIGNIFICANT CHANGE UP (ref 8.4–10.5)
CHLORIDE SERPL-SCNC: 102 MMOL/L — SIGNIFICANT CHANGE UP (ref 96–108)
CO2 SERPL-SCNC: 23 MMOL/L — SIGNIFICANT CHANGE UP (ref 22–31)
CREAT SERPL-MCNC: 1.16 MG/DL — SIGNIFICANT CHANGE UP (ref 0.5–1.3)
EOSINOPHIL # BLD AUTO: 0.1 K/UL — SIGNIFICANT CHANGE UP (ref 0–0.5)
EOSINOPHIL NFR BLD AUTO: 0.9 % — SIGNIFICANT CHANGE UP (ref 0–6)
GLUCOSE SERPL-MCNC: 171 MG/DL — HIGH (ref 70–99)
HCT VFR BLD CALC: 47.5 % — HIGH (ref 34.5–45)
HGB BLD-MCNC: 15.8 G/DL — HIGH (ref 11.5–15.5)
INR BLD: 1.61 RATIO — HIGH (ref 0.88–1.16)
INR PPP: 1.7
LYMPHOCYTES # BLD AUTO: 2 K/UL — SIGNIFICANT CHANGE UP (ref 1–3.3)
LYMPHOCYTES # BLD AUTO: 21 % — SIGNIFICANT CHANGE UP (ref 13–44)
MCHC RBC-ENTMCNC: 30.8 PG — SIGNIFICANT CHANGE UP (ref 27–34)
MCHC RBC-ENTMCNC: 33.2 GM/DL — SIGNIFICANT CHANGE UP (ref 32–36)
MCV RBC AUTO: 93 FL — SIGNIFICANT CHANGE UP (ref 80–100)
MONOCYTES # BLD AUTO: 0.6 K/UL — SIGNIFICANT CHANGE UP (ref 0–0.9)
MONOCYTES NFR BLD AUTO: 6.5 % — SIGNIFICANT CHANGE UP (ref 2–14)
NEUTROPHILS # BLD AUTO: 6.8 K/UL — SIGNIFICANT CHANGE UP (ref 1.8–7.4)
NEUTROPHILS NFR BLD AUTO: 71.3 % — SIGNIFICANT CHANGE UP (ref 43–77)
PLATELET # BLD AUTO: 207 K/UL — SIGNIFICANT CHANGE UP (ref 150–400)
POTASSIUM SERPL-MCNC: 5.2 MMOL/L — SIGNIFICANT CHANGE UP (ref 3.5–5.3)
POTASSIUM SERPL-SCNC: 5.2 MMOL/L — SIGNIFICANT CHANGE UP (ref 3.5–5.3)
PROT SERPL-MCNC: 7.1 G/DL — SIGNIFICANT CHANGE UP (ref 6–8.3)
PROTHROM AB SERPL-ACNC: 18.8 SEC — HIGH (ref 10–12.9)
RBC # BLD: 5.11 M/UL — SIGNIFICANT CHANGE UP (ref 3.8–5.2)
RBC # FLD: 12.4 % — SIGNIFICANT CHANGE UP (ref 10.3–14.5)
SODIUM SERPL-SCNC: 137 MMOL/L — SIGNIFICANT CHANGE UP (ref 135–145)
TROPONIN T, HIGH SENSITIVITY RESULT: 10 NG/L — SIGNIFICANT CHANGE UP (ref 0–51)
TROPONIN T, HIGH SENSITIVITY RESULT: 10 NG/L — SIGNIFICANT CHANGE UP (ref 0–51)
WBC # BLD: 9.5 K/UL — SIGNIFICANT CHANGE UP (ref 3.8–10.5)
WBC # FLD AUTO: 9.5 K/UL — SIGNIFICANT CHANGE UP (ref 3.8–10.5)

## 2019-01-04 PROCEDURE — 85730 THROMBOPLASTIN TIME PARTIAL: CPT

## 2019-01-04 PROCEDURE — 70450 CT HEAD/BRAIN W/O DYE: CPT

## 2019-01-04 PROCEDURE — 99284 EMERGENCY DEPT VISIT MOD MDM: CPT | Mod: GC

## 2019-01-04 PROCEDURE — 93005 ELECTROCARDIOGRAM TRACING: CPT

## 2019-01-04 PROCEDURE — 99284 EMERGENCY DEPT VISIT MOD MDM: CPT | Mod: 25

## 2019-01-04 PROCEDURE — 73562 X-RAY EXAM OF KNEE 3: CPT

## 2019-01-04 PROCEDURE — 84484 ASSAY OF TROPONIN QUANT: CPT

## 2019-01-04 PROCEDURE — 80053 COMPREHEN METABOLIC PANEL: CPT

## 2019-01-04 PROCEDURE — 71045 X-RAY EXAM CHEST 1 VIEW: CPT | Mod: 26

## 2019-01-04 PROCEDURE — 73562 X-RAY EXAM OF KNEE 3: CPT | Mod: 26,LT

## 2019-01-04 PROCEDURE — 72125 CT NECK SPINE W/O DYE: CPT | Mod: 26

## 2019-01-04 PROCEDURE — 85610 PROTHROMBIN TIME: CPT

## 2019-01-04 PROCEDURE — 72125 CT NECK SPINE W/O DYE: CPT

## 2019-01-04 PROCEDURE — 85027 COMPLETE CBC AUTOMATED: CPT

## 2019-01-04 PROCEDURE — 70450 CT HEAD/BRAIN W/O DYE: CPT | Mod: 26

## 2019-01-04 PROCEDURE — 71045 X-RAY EXAM CHEST 1 VIEW: CPT

## 2019-01-04 NOTE — ED ADULT NURSE NOTE - OBJECTIVE STATEMENT
83 year old female presents to ED via EMS from [facility] [place] ([phone]) complaining of fall forward to knees now with left knee pain. History of afib on coumadin, osteoporosis, HTN, neuropathy, HLD, & dementia - on namenda. Per EMS patient was walking to the bathroom with an aide at the [facility] and lost her balance falling forward to knee, did not hit head or lose consciousness. Was complaining of left knee pain - small abrasion to left knee, denies pain at this time. 83 year old female presents to ED via EMS from [facility] ([phone]) complaining of fall forward to knees now with left knee pain. History of afib on coumadin, osteoporosis, HTN, neuropathy, HLD, & dementia - on namenda. Per EMS patient was walking to the bathroom with an aide at the [facility] and lost her balance falling forward to knee, did not hit head or lose consciousness. Was complaining of left knee pain - small abrasion to left knee, denies pain at this time. Further discussion with staff at [facility] states that it was an unwitnessed fall and was found on ground and assisted up. Unsure if she hit head/LOC. Spoke to daughter who states she is confused/dementia at baseline and she typically does not know the year, month or president. Patient is awake, alert to self (aware she is in hospital however unsure which, disoriented to time states it is is September 2011 and that Irving is the President, unsure why she is here), following commands, strong equal strength bilaterally x 4, sensory in tact. Denies HA, chest pain, shortness of breath, abdominal pain, NVD. Patient undressed and placed into gown, call bell in hand and side rails up with bed in lowest position for safety. blanket provided. Comfort and safety provided.

## 2019-01-04 NOTE — ED ADULT NURSE NOTE - PMH
Adult Hypothyroidism  dx:  9/09.  meds X 3 months.  off meds since 12/09.  Afib  paroxysmal.  ( 2008).  treated with metoprolol;  Dementia    History of Renal Calculi  no treatment; assymptomatic.  HTN (Hypertension)    Hypercholesterolemia    Murmur  MVP

## 2019-01-04 NOTE — ED ADULT NURSE NOTE - NSIMPLEMENTINTERV_GEN_ALL_ED
Implemented All Fall Risk Interventions:  [place] to call system. Call bell, personal items and telephone within reach. Instruct patient to call for assistance. Room bathroom lighting operational. Non-slip footwear when patient is off stretcher. Physically safe environment: no spills, clutter or unnecessary equipment. Stretcher in lowest position, wheels locked, appropriate side rails in place. Provide visual cue, wrist band, yellow gown, etc. Monitor gait and stability. Monitor for mental status changes and reorient to person, place, and time. Review medications for side effects contributing to fall risk. Reinforce activity limits and safety measures with patient and family.

## 2019-01-04 NOTE — ED PROVIDER NOTE - NSFOLLOWUPCLINICS_GEN_ALL_ED_FT
[facility]  Cardiology  [address] [place] [id number]  Phone: [phone]  Fax:   Follow Up Time:

## 2019-01-04 NOTE — ED PROVIDER NOTE - PROGRESS NOTE DETAILS
Tarsha Willingham MD PGY-1 CTh without acute pathology, rpt trop stable. Pt seen ambulating safely in ED without incident. will d/c home with cardiology  f/u and instruct pt to f/u with PCP

## 2019-01-04 NOTE — ED PROVIDER NOTE - CARE PLAN
Principal Discharge DX:	Fall  Assessment and plan of treatment:	The patient was discharged from the ED in stable condition. All results of today's workup were discussed with the patient and all questions/concerns were addressed. All discharge instructions were thoroughly verbally discussed with the patient, as well as important warning signs and new/ worsening symptoms which should necessitate patient's immediate return to the ED. The patient is agreeable with discharge and expresses full understanding of all instructions given. Principal Discharge DX:	Abrasion of left knee, initial encounter  Goal:	Left Knee abrasion  Assessment and plan of treatment:	The patient was discharged from the ED in stable condition. All results of today's workup were discussed with the patient and all questions/concerns were addressed. All discharge instructions were thoroughly verbally discussed with the patient, as well as important warning signs and new/ worsening symptoms which should necessitate patient's immediate return to the ED. The patient is agreeable with discharge and expresses full understanding of all instructions given.

## 2019-01-04 NOTE — ED PROVIDER NOTE - MEDICAL DECISION MAKING DETAILS
Attending MD Wilson: 83 you female with PMH for demenia atrial fib coumadin, presents sp unwitnessed fall in dining room and seemed lethargic afterwards.  No recollection of events.  No complaints of pain at  this time, initially left knee pain.  On exam only right deltoid pain.  A and O x 1.  Plan Head and neck CT, xray chest  and left knee, EKG, labs, troponin.

## 2019-01-04 NOTE — ED PROVIDER NOTE - OBJECTIVE STATEMENT
Tarsha Willingham MD PGY-1 Pt is an 82 yo M with PMH dementia, afib (on coumadin),, HTN, HLD, hypothyroid presenting from [place] after unwitnessed fall. Per [place] faculty (called [phone]) fall was unwitnessed, faculty found patient on the floor, seemed lethargic, complaining of left knee pain. States she was in prior health prior to fall. Pt does not remember event, does not remember if she hit her head, does not remember how she got to hospital. States she lives alone.

## 2019-01-04 NOTE — ED PROVIDER NOTE - PHYSICAL EXAMINATION
PHYSICAL EXAM:   General: well-appearing, appears stated age, in no acute distress  HEENT: NC/AT, PERRLA, no raccoon eyes, corral signs  Cardiovascular: regular rate and rhythm, + S1/S2, no murmurs, rubs, gallops appreciated  Respiratory: clear to auscultation bilaterally, good aeration bilaterally, nonlabored respirations  Abdominal: soft, nontender, nondistended, no rebound, guarding or rigidity, +bowel sounds  Back: no midline psinal tenderness  Extremities: no LE edema b/l. Full ROM of all extremities without pain. Endorsing lateral shoulder ttp over deltoid, no overlying ecchymosis or joint tenderness or edema. +Superficial abrasion L knee without pain  Neuro: Alert and oriented x1. CN2-12 intact, Strength 5/5 in upper and lower extremities. Sensation intact to light touch in upper and lower extremities.   Psychiatric: appropriate mood and affect.   Skin: appropriate color, warm, dry except as aforementioned  -Tarsha Willingham PGY-1

## 2019-01-04 NOTE — ED PROVIDER NOTE - ATTENDING CONTRIBUTION TO CARE
Attending MD Wilson:  I personally have seen and examined this patient.  Resident note reviewed and agree on plan of care and except where noted.  See MDM for details.

## 2019-01-04 NOTE — ED PROVIDER NOTE - PLAN OF CARE
The patient was discharged from the ED in stable condition. All results of today's workup were discussed with the patient and all questions/concerns were addressed. All discharge instructions were thoroughly verbally discussed with the patient, as well as important warning signs and new/ worsening symptoms which should necessitate patient's immediate return to the ED. The patient is agreeable with discharge and expresses full understanding of all instructions given. Left Knee abrasion

## 2019-01-04 NOTE — ED PROVIDER NOTE - NSFOLLOWUPINSTRUCTIONS_ED_ALL_ED_FT
1. You were seen in the Emergency Room for fall. It did not appear that this was a result of your heart, and there was no bleeding in the brain.   2. Please follow up with your doctor in 24-48 hours. Please also follow up with cardiology in 24-48 hours (referral given)  3. Return to the emergency room or seek immediate assistance for any new or concerning symptoms (such as fevers, chills,  worsening confusion, chest pain, shortness of breath, decreased strength or sensation ), or if you get worse.   4. Copies of your tests were provided to you for follow-up.  You must address all your findings with your doctor.

## 2019-01-05 NOTE — ED POST DISCHARGE NOTE - ADDITIONAL DOCUMENTATION
RNSanjuana Sullivan at [facility] requesting d/c paperwork for resident as it did not arrive with patient. faxed over paperwork as requested.

## 2019-01-22 LAB — INR PPP: 2.6

## 2019-02-21 LAB — INR PPP: 1.6

## 2019-03-07 LAB — INR PPP: 3.7

## 2019-03-21 ENCOUNTER — OTHER (OUTPATIENT)
Age: 84
End: 2019-03-21

## 2019-03-22 LAB — INR PPP: 4.2

## 2019-03-28 ENCOUNTER — OTHER (OUTPATIENT)
Age: 84
End: 2019-03-28

## 2019-03-28 LAB — INR PPP: 2.7

## 2019-04-11 ENCOUNTER — OTHER (OUTPATIENT)
Age: 84
End: 2019-04-11

## 2019-04-11 LAB — INR PPP: 2.9

## 2019-04-30 LAB — INR PPP: 1.7

## 2019-05-09 ENCOUNTER — OTHER (OUTPATIENT)
Age: 84
End: 2019-05-09

## 2019-05-09 LAB — INR PPP: 1.4

## 2019-05-28 LAB — INR PPP: 3.4

## 2019-06-05 PROBLEM — F03.90 UNSPECIFIED DEMENTIA WITHOUT BEHAVIORAL DISTURBANCE: Chronic | Status: ACTIVE | Noted: 2019-01-04

## 2019-06-06 LAB — INR PPP: 3.5

## 2019-06-20 ENCOUNTER — OTHER (OUTPATIENT)
Age: 84
End: 2019-06-20

## 2019-06-20 LAB — INR PPP: 1.8

## 2019-06-27 ENCOUNTER — APPOINTMENT (OUTPATIENT)
Dept: GASTROENTEROLOGY | Facility: CLINIC | Age: 84
End: 2019-06-27
Payer: MEDICARE

## 2019-06-27 VITALS
WEIGHT: 146 LBS | BODY MASS INDEX: 24.92 KG/M2 | OXYGEN SATURATION: 98 % | HEIGHT: 64 IN | SYSTOLIC BLOOD PRESSURE: 123 MMHG | DIASTOLIC BLOOD PRESSURE: 86 MMHG | HEART RATE: 95 BPM | RESPIRATION RATE: 17 BRPM

## 2019-06-27 DIAGNOSIS — R19.7 DIARRHEA, UNSPECIFIED: ICD-10-CM

## 2019-06-27 DIAGNOSIS — K90.49 MALABSORPTION DUE TO INTOLERANCE, NOT ELSEWHERE CLASSIFIED: ICD-10-CM

## 2019-06-27 DIAGNOSIS — R10.9 UNSPECIFIED ABDOMINAL PAIN: ICD-10-CM

## 2019-06-27 DIAGNOSIS — R14.3 FLATULENCE: ICD-10-CM

## 2019-06-27 DIAGNOSIS — R12 HEARTBURN: ICD-10-CM

## 2019-06-27 PROCEDURE — 99204 OFFICE O/P NEW MOD 45 MIN: CPT

## 2019-06-27 NOTE — PHYSICAL EXAM
[Outer Ear] : the ears and nose were normal in appearance [General Appearance - Alert] : alert [General Appearance - In No Acute Distress] : in no acute distress [Neck Cervical Mass (___cm)] : no neck mass was observed [Oropharynx] : the oropharynx was normal [Neck Appearance] : the appearance of the neck was normal [Thyroid Diffuse Enlargement] : the thyroid was not enlarged [Thyroid Nodule] : there were no palpable thyroid nodules [Jugular Venous Distention Increased] : there was no jugular-venous distention [Auscultation Breath Sounds / Voice Sounds] : lungs were clear to auscultation bilaterally [Normal] : normal [Soft, Nontender] : the abdomen was soft and nontender [Epigastric] : in the epigastric area [No HSM] : no hepatosplenomegaly noted [No Mass] : no masses were palpated [Skin Turgor] : normal skin turgor [Occult Blood Positive] : stool was negative for occult blood [Skin Color & Pigmentation] : normal skin color and pigmentation [] : no rash [Deep Tendon Reflexes (DTR)] : deep tendon reflexes were 2+ and symmetric [Sensation] : the sensory exam was normal to light touch and pinprick [Impaired Insight] : insight and judgment were intact [Affect] : the affect was normal [No Focal Deficits] : no focal deficits [Oriented To Time, Place, And Person] : oriented to person, place, and time

## 2019-06-27 NOTE — HISTORY OF PRESENT ILLNESS
[de-identified] : Per daughter x months - No wt loss\par \par +GERD/Dyspesia \par \par Do not want endoscopic eval now\par \par A low acid / reflux diet was discussed in great detail including  not smoking, not drinking alcohol, and not consuming foods that irritate the esophagus. It is helpful to eat small meals throughout the day instead of large meals. You should avoid eating before bedtime or lying down after you eat. It can be helpful to raise the head of your bed six inches. Additionally, you should maintain a healthy weight and good posture.. The patient was given written material to take home and review.\par

## 2019-07-08 LAB — INR PPP: 2.5

## 2019-07-29 LAB — INR PPP: 1.7

## 2019-08-08 LAB — INR PPP: 2.1

## 2019-08-16 ENCOUNTER — EMERGENCY (EMERGENCY)
Facility: HOSPITAL | Age: 84
LOS: 1 days | Discharge: HOME | End: 2019-08-16
Attending: EMERGENCY MEDICINE
Payer: MEDICARE

## 2019-08-16 VITALS
WEIGHT: 130.07 LBS | DIASTOLIC BLOOD PRESSURE: 84 MMHG | OXYGEN SATURATION: 100 % | HEIGHT: 66 IN | HEART RATE: 71 BPM | SYSTOLIC BLOOD PRESSURE: 134 MMHG | RESPIRATION RATE: 16 BRPM | TEMPERATURE: 97 F

## 2019-08-16 LAB
APTT BLD: 37.5 SEC — HIGH (ref 27.5–36.3)
BASOPHILS # BLD AUTO: 0.1 K/UL — SIGNIFICANT CHANGE UP (ref 0–0.2)
BASOPHILS NFR BLD AUTO: 0.9 % — SIGNIFICANT CHANGE UP (ref 0–2)
EOSINOPHIL # BLD AUTO: 0.3 K/UL — SIGNIFICANT CHANGE UP (ref 0–0.5)
EOSINOPHIL NFR BLD AUTO: 2.2 % — SIGNIFICANT CHANGE UP (ref 0–6)
GAS PNL BLDV: SIGNIFICANT CHANGE UP
HCT VFR BLD CALC: 48.4 % — HIGH (ref 34.5–45)
HGB BLD-MCNC: 15.6 G/DL — HIGH (ref 11.5–15.5)
INR BLD: 2.39 RATIO — HIGH (ref 0.88–1.16)
LYMPHOCYTES # BLD AUTO: 26.8 % — SIGNIFICANT CHANGE UP (ref 13–44)
LYMPHOCYTES # BLD AUTO: 3.1 K/UL — SIGNIFICANT CHANGE UP (ref 1–3.3)
MCHC RBC-ENTMCNC: 29.7 PG — SIGNIFICANT CHANGE UP (ref 27–34)
MCHC RBC-ENTMCNC: 32.3 GM/DL — SIGNIFICANT CHANGE UP (ref 32–36)
MCV RBC AUTO: 92 FL — SIGNIFICANT CHANGE UP (ref 80–100)
MONOCYTES # BLD AUTO: 0.7 K/UL — SIGNIFICANT CHANGE UP (ref 0–0.9)
MONOCYTES NFR BLD AUTO: 6.3 % — SIGNIFICANT CHANGE UP (ref 2–14)
NEUTROPHILS # BLD AUTO: 7.3 K/UL — SIGNIFICANT CHANGE UP (ref 1.8–7.4)
NEUTROPHILS NFR BLD AUTO: 63.8 % — SIGNIFICANT CHANGE UP (ref 43–77)
PLATELET # BLD AUTO: 236 K/UL — SIGNIFICANT CHANGE UP (ref 150–400)
PROTHROM AB SERPL-ACNC: 28.2 SEC — HIGH (ref 10–12.9)
RBC # BLD: 5.27 M/UL — HIGH (ref 3.8–5.2)
RBC # FLD: 11.7 % — SIGNIFICANT CHANGE UP (ref 10.3–14.5)
TROPONIN T, HIGH SENSITIVITY RESULT: 8 NG/L — SIGNIFICANT CHANGE UP (ref 0–51)
WBC # BLD: 11.4 K/UL — HIGH (ref 3.8–10.5)
WBC # FLD AUTO: 11.4 K/UL — HIGH (ref 3.8–10.5)

## 2019-08-16 PROCEDURE — 99284 EMERGENCY DEPT VISIT MOD MDM: CPT | Mod: GC

## 2019-08-16 NOTE — ED ADULT NURSE NOTE - NSIMPLEMENTINTERV_GEN_ALL_ED
Implemented All Fall with Harm Risk Interventions:  [place] to call system. Call bell, personal items and telephone within reach. Instruct patient to call for assistance. Room bathroom lighting operational. Non-slip footwear when patient is off stretcher. Physically safe environment: no spills, clutter or unnecessary equipment. Stretcher in lowest position, wheels locked, appropriate side rails in place. Provide visual cue, wrist band, yellow gown, etc. Monitor gait and stability. Monitor for mental status changes and reorient to person, place, and time. Review medications for side effects contributing to fall risk. Reinforce activity limits and safety measures with patient and family. Provide visual clues: red socks.

## 2019-08-16 NOTE — ED ADULT NURSE NOTE - OBJECTIVE STATEMENT
82 y/o female history of dementia, murmur, A.Fib., HTN, hypercholesteremia, & hypothyroidism presents to the ED from home c/o fatigue. Patient states that for the past month she has been fatigues and has not been able to get out of bed as much as she normally does. 84 y/o female history of dementia, murmur, A.Fib., HTN, hypercholesteremia, & hypothyroidism presents to the ED from home c/o fatigue. Patient states that for the past month she has been fatigues and has not been able to get out of bed as much as she normally does. Patient's daughter states that she has had been eating less over the past month. Denies fever, chills, n/v, abd pain, diarrhea/constipation, numbness/tingling, urinary s/s. Patient A&Ox3, in no respiratory distress, and denies chest pain. Abdomen soft and non distended. Patient ambulated in ED. Strong peripheral pulses.

## 2019-08-16 NOTE — ED PROVIDER NOTE - PROGRESS NOTE DETAILS
ED Sign out, eval FTT, baseline dementia, awaiting CMP UA EKG, likely dc back to facility if appropriate for outpt tx -- Arnold Jackson MD per lab, CMP hemolzyed potassium >9 will redraw

## 2019-08-16 NOTE — ED PROVIDER NOTE - NSFOLLOWUPINSTRUCTIONS_ED_ALL_ED_FT
You were seen today at our department and we recommend the following:    Please read the attached provided information on Urinary Tract Infections.     As we discussed please contact your primary care provider and share your lab results so that they can further assist you with medication management.      As prior urinary samples have been contaminated in the past, you have preferred to avoid antibiotics today. Please discuss further care with your physician.

## 2019-08-16 NOTE — ED PROVIDER NOTE - NS ED ROS FT
Gen: + weakness  CV: Denies chest pain, palpitations  Skin: Denies rash, erythema, color changes  Resp: Denies SOB, cough  Endo: Denies sensitivity to heat, cold, increased urination  GI: Denies constipation, nausea, vomiting  Msk: Denies back pain, LE swelling, extremity pain  : Denies dysuria, increased frequency  Neuro: Denies LOC, weakness, seizures  Psych: Denies hx of psych, hallucinations    ROS per daughter

## 2019-08-16 NOTE — ED PROVIDER NOTE - PHYSICAL EXAMINATION
Gen: WDWN, NAD  HEENT: EOMI, no nasal discharge, mucous membranes moist  CV: RRR, +S1/S2, no M/R/G  Resp: CTAB, no W/R/R  GI: Abdomen soft non-distended, NTTP, no masses  MSK: No open wounds, no bruising, no LE edema  Neuro: A&Ox4, following commands, moving all four extremities spontaneously  Psych: appropriate mood, denies AH, VH, SI, pt appears nervous

## 2019-08-16 NOTE — ED PROVIDER NOTE - CHIEF COMPLAINT
The patient is a 83y Female complaining of The patient is a 83y Female complaining of generalized weakness

## 2019-08-16 NOTE — ED PROVIDER NOTE - OBJECTIVE STATEMENT
83F PMH afib on coumadin, hypothyroidism, dementia, HTN, HLD brought in by daughter for weakness. HPI per daughter reports pt has been feeling weak, unwilling to get out of bed in AM & unwilling to eat. Pt denying any symptoms at this point but daughter is concerned pt is deconditioning. Daughter reports mother has been afebrile, not complaining of chills, chest pain, SOB, no palpitations. Pt at bedside denying all ROS. 83F PMH afib on coumadin, hypothyroidism, dementia, HTN, HLD brought in by daughter for weakness. HPI per daughter reporting pt has been feeling weak, unwilling to get out of bed in AM & unwilling to eat. Pt denying any symptoms at this point but daughter is concerned pt is deconditioning. Daughter reports mother has been afebrile, not complaining of chills, HA, CP, SOB, no palpitations, no dyspnea no dysuria. Pt denies any symptoms but appears to be anxious to leave.

## 2019-08-16 NOTE — ED PROVIDER NOTE - ATTENDING CONTRIBUTION TO CARE
83yr F resident of long term facility due to dementia who is brought in by daughter out of concern for poor feeding and low energy. she denies any specific complaints, but daughter says she is missing food and lost appetite, no specific report of weight loss. she is compliant with meds, no new changes per daughter, she is taking warfarin.  exam is unremarkable.  plan for infectious work up, labs, cxr, urine and tsh. daughter understands that TSH may not immediately results and is ok with that.  signed out pending results and dispo. if work up negative, may go back to long term facility.

## 2019-08-17 VITALS
SYSTOLIC BLOOD PRESSURE: 148 MMHG | HEART RATE: 93 BPM | RESPIRATION RATE: 16 BRPM | TEMPERATURE: 98 F | DIASTOLIC BLOOD PRESSURE: 84 MMHG | OXYGEN SATURATION: 97 %

## 2019-08-17 LAB
ALBUMIN SERPL ELPH-MCNC: 4.3 G/DL — SIGNIFICANT CHANGE UP (ref 3.3–5)
ALBUMIN SERPL ELPH-MCNC: 4.4 G/DL — SIGNIFICANT CHANGE UP (ref 3.3–5)
ALP SERPL-CCNC: 113 U/L — SIGNIFICANT CHANGE UP (ref 40–120)
ALP SERPL-CCNC: 84 U/L — SIGNIFICANT CHANGE UP (ref 40–120)
ALT FLD-CCNC: 27 U/L — SIGNIFICANT CHANGE UP (ref 10–45)
ALT FLD-CCNC: 49 U/L — HIGH (ref 10–45)
ANION GAP SERPL CALC-SCNC: 14 MMOL/L — SIGNIFICANT CHANGE UP (ref 5–17)
ANION GAP SERPL CALC-SCNC: 8 MMOL/L — SIGNIFICANT CHANGE UP (ref 5–17)
APPEARANCE UR: ABNORMAL
AST SERPL-CCNC: 124 U/L — HIGH (ref 10–40)
AST SERPL-CCNC: 23 U/L — SIGNIFICANT CHANGE UP (ref 10–40)
BACTERIA # UR AUTO: NEGATIVE — SIGNIFICANT CHANGE UP
BASE EXCESS BLDV CALC-SCNC: -0.4 MMOL/L — SIGNIFICANT CHANGE UP (ref -2–2)
BILIRUB SERPL-MCNC: 0.3 MG/DL — SIGNIFICANT CHANGE UP (ref 0.2–1.2)
BILIRUB SERPL-MCNC: 0.4 MG/DL — SIGNIFICANT CHANGE UP (ref 0.2–1.2)
BILIRUB UR-MCNC: NEGATIVE — SIGNIFICANT CHANGE UP
BUN SERPL-MCNC: 16 MG/DL — SIGNIFICANT CHANGE UP (ref 7–23)
BUN SERPL-MCNC: 17 MG/DL — SIGNIFICANT CHANGE UP (ref 7–23)
CA-I SERPL-SCNC: 1.23 MMOL/L — SIGNIFICANT CHANGE UP (ref 1.12–1.3)
CALCIUM SERPL-MCNC: 10.3 MG/DL — SIGNIFICANT CHANGE UP (ref 8.4–10.5)
CALCIUM SERPL-MCNC: 9.9 MG/DL — SIGNIFICANT CHANGE UP (ref 8.4–10.5)
CHLORIDE BLDV-SCNC: 111 MMOL/L — HIGH (ref 96–108)
CHLORIDE SERPL-SCNC: 103 MMOL/L — SIGNIFICANT CHANGE UP (ref 96–108)
CHLORIDE SERPL-SCNC: 103 MMOL/L — SIGNIFICANT CHANGE UP (ref 96–108)
CO2 BLDV-SCNC: 25 MMOL/L — SIGNIFICANT CHANGE UP (ref 22–30)
CO2 SERPL-SCNC: 20 MMOL/L — LOW (ref 22–31)
CO2 SERPL-SCNC: 21 MMOL/L — LOW (ref 22–31)
COD CRY URNS QL: ABNORMAL
COLOR SPEC: YELLOW — SIGNIFICANT CHANGE UP
CREAT SERPL-MCNC: 0.86 MG/DL — SIGNIFICANT CHANGE UP (ref 0.5–1.3)
CREAT SERPL-MCNC: 1.12 MG/DL — SIGNIFICANT CHANGE UP (ref 0.5–1.3)
DIFF PNL FLD: NEGATIVE — SIGNIFICANT CHANGE UP
EPI CELLS # UR: 3 /HPF — SIGNIFICANT CHANGE UP
GAS PNL BLDV: 133 MMOL/L — LOW (ref 135–145)
GAS PNL BLDV: SIGNIFICANT CHANGE UP
GLUCOSE BLDV-MCNC: 189 MG/DL — HIGH (ref 70–99)
GLUCOSE SERPL-MCNC: 190 MG/DL — HIGH (ref 70–99)
GLUCOSE SERPL-MCNC: 195 MG/DL — HIGH (ref 70–99)
GLUCOSE UR QL: ABNORMAL
HCO3 BLDV-SCNC: 24 MMOL/L — SIGNIFICANT CHANGE UP (ref 21–29)
HCT VFR BLDA CALC: 49 % — SIGNIFICANT CHANGE UP (ref 39–50)
HGB BLD CALC-MCNC: 15.9 G/DL — HIGH (ref 11.5–15.5)
HYALINE CASTS # UR AUTO: 4 /LPF — HIGH (ref 0–2)
KETONES UR-MCNC: NEGATIVE — SIGNIFICANT CHANGE UP
LACTATE BLDV-MCNC: 3 MMOL/L — HIGH (ref 0.7–2)
LEUKOCYTE ESTERASE UR-ACNC: ABNORMAL
NITRITE UR-MCNC: NEGATIVE — SIGNIFICANT CHANGE UP
PCO2 BLDV: 39 MMHG — SIGNIFICANT CHANGE UP (ref 35–50)
PH BLDV: 7.4 — SIGNIFICANT CHANGE UP (ref 7.35–7.45)
PH UR: 5.5 — SIGNIFICANT CHANGE UP (ref 5–8)
PO2 BLDV: 71 MMHG — HIGH (ref 25–45)
POTASSIUM BLDV-SCNC: 3.9 MMOL/L — SIGNIFICANT CHANGE UP (ref 3.5–5.3)
POTASSIUM SERPL-MCNC: 3.6 MMOL/L — SIGNIFICANT CHANGE UP (ref 3.5–5.3)
POTASSIUM SERPL-MCNC: >9 MMOL/L — CRITICAL HIGH (ref 3.5–5.3)
POTASSIUM SERPL-SCNC: 3.6 MMOL/L — SIGNIFICANT CHANGE UP (ref 3.5–5.3)
POTASSIUM SERPL-SCNC: >9 MMOL/L — CRITICAL HIGH (ref 3.5–5.3)
PROT SERPL-MCNC: 7.5 G/DL — SIGNIFICANT CHANGE UP (ref 6–8.3)
PROT SERPL-MCNC: 8.4 G/DL — HIGH (ref 6–8.3)
PROT UR-MCNC: ABNORMAL
RBC CASTS # UR COMP ASSIST: 8 /HPF — HIGH (ref 0–4)
SAO2 % BLDV: 96 % — HIGH (ref 67–88)
SODIUM SERPL-SCNC: 131 MMOL/L — LOW (ref 135–145)
SODIUM SERPL-SCNC: 138 MMOL/L — SIGNIFICANT CHANGE UP (ref 135–145)
SP GR SPEC: 1.03 — HIGH (ref 1.01–1.02)
TSH SERPL-MCNC: 8.74 UIU/ML — HIGH (ref 0.27–4.2)
UROBILINOGEN FLD QL: ABNORMAL
WBC UR QL: 371 /HPF — HIGH (ref 0–5)

## 2019-08-17 PROCEDURE — 84484 ASSAY OF TROPONIN QUANT: CPT

## 2019-08-17 PROCEDURE — 85027 COMPLETE CBC AUTOMATED: CPT

## 2019-08-17 PROCEDURE — 80053 COMPREHEN METABOLIC PANEL: CPT

## 2019-08-17 PROCEDURE — 82435 ASSAY OF BLOOD CHLORIDE: CPT

## 2019-08-17 PROCEDURE — 71045 X-RAY EXAM CHEST 1 VIEW: CPT

## 2019-08-17 PROCEDURE — 84132 ASSAY OF SERUM POTASSIUM: CPT

## 2019-08-17 PROCEDURE — 85610 PROTHROMBIN TIME: CPT

## 2019-08-17 PROCEDURE — 82330 ASSAY OF CALCIUM: CPT

## 2019-08-17 PROCEDURE — 81001 URINALYSIS AUTO W/SCOPE: CPT

## 2019-08-17 PROCEDURE — 93005 ELECTROCARDIOGRAM TRACING: CPT

## 2019-08-17 PROCEDURE — 82962 GLUCOSE BLOOD TEST: CPT

## 2019-08-17 PROCEDURE — 84295 ASSAY OF SERUM SODIUM: CPT

## 2019-08-17 PROCEDURE — 82803 BLOOD GASES ANY COMBINATION: CPT

## 2019-08-17 PROCEDURE — 71045 X-RAY EXAM CHEST 1 VIEW: CPT | Mod: 26

## 2019-08-17 PROCEDURE — 99283 EMERGENCY DEPT VISIT LOW MDM: CPT | Mod: 25

## 2019-08-17 PROCEDURE — 82947 ASSAY GLUCOSE BLOOD QUANT: CPT

## 2019-08-17 PROCEDURE — 84443 ASSAY THYROID STIM HORMONE: CPT

## 2019-08-17 PROCEDURE — 85014 HEMATOCRIT: CPT

## 2019-08-17 PROCEDURE — 82565 ASSAY OF CREATININE: CPT

## 2019-08-17 PROCEDURE — 87086 URINE CULTURE/COLONY COUNT: CPT

## 2019-08-17 PROCEDURE — 83605 ASSAY OF LACTIC ACID: CPT

## 2019-08-17 PROCEDURE — 85730 THROMBOPLASTIN TIME PARTIAL: CPT

## 2019-08-18 LAB
CULTURE RESULTS: SIGNIFICANT CHANGE UP
SPECIMEN SOURCE: SIGNIFICANT CHANGE UP

## 2019-08-27 ENCOUNTER — EMERGENCY (EMERGENCY)
Facility: HOSPITAL | Age: 84
LOS: 1 days | Discharge: HOME | End: 2019-08-27
Attending: EMERGENCY MEDICINE
Payer: MEDICARE

## 2019-08-27 VITALS
RESPIRATION RATE: 16 BRPM | DIASTOLIC BLOOD PRESSURE: 87 MMHG | OXYGEN SATURATION: 99 % | HEART RATE: 90 BPM | SYSTOLIC BLOOD PRESSURE: 164 MMHG | TEMPERATURE: 98 F

## 2019-08-27 VITALS
SYSTOLIC BLOOD PRESSURE: 131 MMHG | OXYGEN SATURATION: 93 % | DIASTOLIC BLOOD PRESSURE: 57 MMHG | HEART RATE: 83 BPM | TEMPERATURE: 98 F | RESPIRATION RATE: 16 BRPM

## 2019-08-27 LAB
ALBUMIN SERPL ELPH-MCNC: 4.1 G/DL — SIGNIFICANT CHANGE UP (ref 3.3–5)
ALP SERPL-CCNC: 115 U/L — SIGNIFICANT CHANGE UP (ref 40–120)
ALT FLD-CCNC: 31 U/L — SIGNIFICANT CHANGE UP (ref 10–45)
ANION GAP SERPL CALC-SCNC: 12 MMOL/L — SIGNIFICANT CHANGE UP (ref 5–17)
AST SERPL-CCNC: 25 U/L — SIGNIFICANT CHANGE UP (ref 10–40)
BASOPHILS # BLD AUTO: 0 K/UL — SIGNIFICANT CHANGE UP (ref 0–0.2)
BASOPHILS NFR BLD AUTO: 0.5 % — SIGNIFICANT CHANGE UP (ref 0–2)
BILIRUB SERPL-MCNC: 0.2 MG/DL — SIGNIFICANT CHANGE UP (ref 0.2–1.2)
BUN SERPL-MCNC: 12 MG/DL — SIGNIFICANT CHANGE UP (ref 7–23)
CALCIUM SERPL-MCNC: 9.8 MG/DL — SIGNIFICANT CHANGE UP (ref 8.4–10.5)
CHLORIDE SERPL-SCNC: 103 MMOL/L — SIGNIFICANT CHANGE UP (ref 96–108)
CO2 SERPL-SCNC: 24 MMOL/L — SIGNIFICANT CHANGE UP (ref 22–31)
CREAT SERPL-MCNC: 1 MG/DL — SIGNIFICANT CHANGE UP (ref 0.5–1.3)
EOSINOPHIL # BLD AUTO: 0.3 K/UL — SIGNIFICANT CHANGE UP (ref 0–0.5)
EOSINOPHIL NFR BLD AUTO: 2.9 % — SIGNIFICANT CHANGE UP (ref 0–6)
GLUCOSE SERPL-MCNC: 200 MG/DL — HIGH (ref 70–99)
HCT VFR BLD CALC: 46.3 % — HIGH (ref 34.5–45)
HGB BLD-MCNC: 15 G/DL — SIGNIFICANT CHANGE UP (ref 11.5–15.5)
INR BLD: 3.26 RATIO — HIGH (ref 0.88–1.16)
LYMPHOCYTES # BLD AUTO: 2.5 K/UL — SIGNIFICANT CHANGE UP (ref 1–3.3)
LYMPHOCYTES # BLD AUTO: 25.4 % — SIGNIFICANT CHANGE UP (ref 13–44)
MCHC RBC-ENTMCNC: 30.4 PG — SIGNIFICANT CHANGE UP (ref 27–34)
MCHC RBC-ENTMCNC: 32.3 GM/DL — SIGNIFICANT CHANGE UP (ref 32–36)
MCV RBC AUTO: 94.2 FL — SIGNIFICANT CHANGE UP (ref 80–100)
MONOCYTES # BLD AUTO: 0.7 K/UL — SIGNIFICANT CHANGE UP (ref 0–0.9)
MONOCYTES NFR BLD AUTO: 7.6 % — SIGNIFICANT CHANGE UP (ref 2–14)
NEUTROPHILS # BLD AUTO: 6.2 K/UL — SIGNIFICANT CHANGE UP (ref 1.8–7.4)
NEUTROPHILS NFR BLD AUTO: 63.6 % — SIGNIFICANT CHANGE UP (ref 43–77)
PLATELET # BLD AUTO: 217 K/UL — SIGNIFICANT CHANGE UP (ref 150–400)
POTASSIUM SERPL-MCNC: 4 MMOL/L — SIGNIFICANT CHANGE UP (ref 3.5–5.3)
POTASSIUM SERPL-SCNC: 4 MMOL/L — SIGNIFICANT CHANGE UP (ref 3.5–5.3)
PROT SERPL-MCNC: 6.9 G/DL — SIGNIFICANT CHANGE UP (ref 6–8.3)
PROTHROM AB SERPL-ACNC: 38.5 SEC — HIGH (ref 10–12.9)
RBC # BLD: 4.92 M/UL — SIGNIFICANT CHANGE UP (ref 3.8–5.2)
RBC # FLD: 11.8 % — SIGNIFICANT CHANGE UP (ref 10.3–14.5)
SODIUM SERPL-SCNC: 139 MMOL/L — SIGNIFICANT CHANGE UP (ref 135–145)
WBC # BLD: 9.8 K/UL — SIGNIFICANT CHANGE UP (ref 3.8–10.5)
WBC # FLD AUTO: 9.8 K/UL — SIGNIFICANT CHANGE UP (ref 3.8–10.5)

## 2019-08-27 PROCEDURE — 93005 ELECTROCARDIOGRAM TRACING: CPT

## 2019-08-27 PROCEDURE — 85027 COMPLETE CBC AUTOMATED: CPT

## 2019-08-27 PROCEDURE — 99284 EMERGENCY DEPT VISIT MOD MDM: CPT | Mod: GC

## 2019-08-27 PROCEDURE — 85610 PROTHROMBIN TIME: CPT

## 2019-08-27 PROCEDURE — 70450 CT HEAD/BRAIN W/O DYE: CPT

## 2019-08-27 PROCEDURE — 70450 CT HEAD/BRAIN W/O DYE: CPT | Mod: 26

## 2019-08-27 PROCEDURE — 72125 CT NECK SPINE W/O DYE: CPT | Mod: 26

## 2019-08-27 PROCEDURE — 72125 CT NECK SPINE W/O DYE: CPT

## 2019-08-27 PROCEDURE — 99284 EMERGENCY DEPT VISIT MOD MDM: CPT | Mod: 25

## 2019-08-27 PROCEDURE — 80053 COMPREHEN METABOLIC PANEL: CPT

## 2019-08-27 NOTE — ED PROVIDER NOTE - NSFOLLOWUPINSTRUCTIONS_ED_ALL_ED_FT
Please follow up with your primary care doctor routinely. Continue to use motrin and tylenol for pain. Return to the ED if you develop any worsening symptoms.

## 2019-08-27 NOTE — ED PROVIDER NOTE - SECONDARY DIAGNOSIS.
Patient Education     Folliculitis  Folliculitis is an infection of a hair follicle. A hair follicle is the little pocket where a hair grows out of the skin. Bacteria normally live on the skin. But sometimes bacteria can get trapped in a follicle and cause inflammation. This causes a bumpy rash. The area over the follicles is red and raised. It may itch or be painful. The bumps may have fluid (pus) inside. The pus may leak and then form crusts. Sores can spread to other areas of the body. Once it goes away, folliculitis can come back at any time. Severe cases may cause permanent hair loss and scarring.  Folliculitis can happen anywhere on the body that hair grows. It can be caused by rubbing from tight clothing. Ingrown hairs can cause it. Soaking in a hot tub or swimming pool that has bacteria in the water can cause it. It may also occur if a hair follicle is blocked by a bandage.  Sores often go away in a few days with no treatment. In some cases, medication may be given. A small piece of skin or pus may be taken to find the type of bacteria causing the infection.  Home care  The health care provider may prescribe an antibiotic cream or ointment.  Oral antibiotics may also be prescribed. Or you may be told to use an over-the-counter antibiotic cream. Follow all instructions when using any of these medications.  General care:  · Apply warm, moist compresses to the sores for 20 minutes up to 3 times a day. You can make a compress by soaking a cloth in warm water. Squeeze out excess water.  · Don’t cut, poke, or squeeze the sores. This can be painful and spread infection.  · Don’t scratch the affected area. Scratching can delay healing.  · Don’t shave the areas affected by folliculitis.  · If the sores leak fluid, cover the area with a nonstick gauze bandage. Use as little tape as possible. Carefully discard all soiled bandages.  · Dress in loose cotton clothing.  · Change sheets and blankets if they are soiled by  pus. Wash all clothes, towels, sheets, and cloth diapers in soap and hot water. Do not share clothes, towels, or sheets with other family members.  · Do not soak the sores in bath water. This can spread infection. Instead, keep the area clean by gently washing sores with soap and warm water.  · Wash your hands or use antibacterial gels often to prevent spreading the bacteria.  Follow-up care  Follow up with your health care provider.  When to seek medical advice  Call your health care provider right away if any of these occur:  · Fever of 100.4°F (38°C) or higher, rectal  · Spreading of the rash  · Rash does not get better with treatment  · Redness or swelling that gets worse  · Rash becomes more painful  · Foul-smelling fluid leaking from the skin  · Rash improves, but then comes back   © 5285-6802 [employer]. [address] [place] [address], [place] [address]. All rights reserved. This information is not intended as a substitute for professional medical care. Always follow your healthcare professional's instructions.            Head injury

## 2019-08-27 NOTE — ED PROVIDER NOTE - PATIENT PORTAL LINK FT
You can access the FollowMyHealth Patient Portal offered by [facility] by registering at the following website: [email or web address]. By joining [employer]’s FollowMyHealth portal, you will also be able to view your health information using other applications (apps) compatible with our system.

## 2019-08-27 NOTE — ED PROVIDER NOTE - PHYSICAL EXAMINATION
Trauma assessment: Spine non-tender throughout. No step offs noted over the head. No otorrhea, rhinorrhea, corral's sign or raccoon's eyes.

## 2019-08-27 NOTE — ED PROVIDER NOTE - OBJECTIVE STATEMENT
82yo F w/ PMHx of hypothyrodism, dementia, HTN, HLD and on coumadin for afib p/w a fall. EMS states pt had an unwitnessed fall roughly 1.5 hours ago at assisted living and that she does not recall the event. Pt is AAO*3 and states that she does remember and had slipped while walking and fell on her bottom. Denies any lightheadedness, head trauma, nausea, vomiting, recent illnesses, or any current pain.

## 2019-08-27 NOTE — ED PROVIDER NOTE - CLINICAL SUMMARY MEDICAL DECISION MAKING FREE TEXT BOX
82yo F on coumadin p/w fall unwitnessed. Will obtain a CT head and neck and an EKG. 82yo F on coumadin p/w fall unwitnessed. Will obtain a CT head and an EKG. Nexus c-spine criteria negative.

## 2019-08-27 NOTE — ED ADULT NURSE NOTE - NSIMPLEMENTINTERV_GEN_ALL_ED
Implemented All Fall Risk Interventions:  [place] to call system. Call bell, personal items and telephone within reach. Instruct patient to call for assistance. Room bathroom lighting operational. Non-slip footwear when patient is off stretcher. Physically safe environment: no spills, clutter or unnecessary equipment. Stretcher in lowest position, wheels locked, appropriate side rails in place. Provide visual cue, wrist band, yellow gown, etc. Monitor gait and stability. Monitor for mental status changes and reorient to person, place, and time. Review medications for side effects contributing to fall risk. Reinforce activity limits and safety measures with patient and family.

## 2019-08-27 NOTE — ED ADULT NURSE NOTE - OBJECTIVE STATEMENT
Pt 84 y/o female AXOx2 with history of dementia presents to ED by EMS s/p fall this morning at [facility] assisted [facility]. Pt is a poor historian but reporting "slipping" and is unaware if she hit her head at all. Pt PMH includes HTN, osteoporosis and a fib on coumadin. Pt has no complaints and report no pain at this time. Pt is well appearing and speaking in full sentences without difficulty. Pt breathing unlabored and skin warm dry and intact. No obvious injuries noted at this time and pt moving all extremities freely. Pt denies CP, SOB, HA, fevers, Chills, or vision changes. Safety measures reinforced- bed placed in lowest position and side rails raised. Pt oriented to call bell system with daughter at bedside.

## 2019-08-27 NOTE — ED PROVIDER NOTE - ATTENDING CONTRIBUTION TO CARE
****ATTENDING**** 82yo f hx listed presents from assisted living with fall. Patient unsure of what happened states she may have lost her balance. Denies any acute complaints. States she is sent here for an evaluation.  On exam, Patient is awake, alert x 2. GCS15. NCAT, PERRL. No Posterior midline cervical spine tenderness. Full ROM and neuro intact. Chest is cleart to auscultation. +S1S2. Abdomen is soft nondistended/nontender +BS. No rebound or guarding. Pelvis is stable. Full ROM B/L hips. Back non tender midline T/L spine.   Check CT and labs. Pt well appearing. EKG reviewed. Follow up results.

## 2019-10-04 LAB — INR PPP: 2.1

## 2019-10-14 ENCOUNTER — MEDICATION RENEWAL (OUTPATIENT)
Age: 84
End: 2019-10-14

## 2019-10-31 ENCOUNTER — RX RENEWAL (OUTPATIENT)
Age: 84
End: 2019-10-31

## 2019-11-21 ENCOUNTER — CHART COPY (OUTPATIENT)
Age: 84
End: 2019-11-21

## 2019-11-27 LAB — INR PPP: 3.3

## 2019-12-05 ENCOUNTER — CHART COPY (OUTPATIENT)
Age: 84
End: 2019-12-05

## 2019-12-06 LAB — INR PPP: 3.3

## 2019-12-20 LAB — INR PPP: 1.8

## 2019-12-30 ENCOUNTER — RX RENEWAL (OUTPATIENT)
Age: 84
End: 2019-12-30

## 2020-01-01 ENCOUNTER — EMERGENCY (EMERGENCY)
Facility: HOSPITAL | Age: 85
LOS: 1 days | Discharge: HOME | End: 2020-01-01
Attending: EMERGENCY MEDICINE
Payer: MEDICARE

## 2020-01-01 ENCOUNTER — TRANSCRIPTION ENCOUNTER (OUTPATIENT)
Age: 85
End: 2020-01-01

## 2020-01-01 ENCOUNTER — APPOINTMENT (OUTPATIENT)
Dept: CARDIOLOGY | Facility: CLINIC | Age: 85
End: 2020-01-01
Payer: MEDICARE

## 2020-01-01 ENCOUNTER — INPATIENT (INPATIENT)
Facility: HOSPITAL | Age: 85
LOS: 4 days | Discharge: NURSING, REHAB OR LONG-TERM CARE | DRG: 690 | End: 2020-09-06
Attending: INTERNAL MEDICINE | Admitting: INTERNAL MEDICINE
Payer: MEDICARE

## 2020-01-01 ENCOUNTER — INPATIENT (INPATIENT)
Facility: HOSPITAL | Age: 85
LOS: 4 days | Discharge: HOME | DRG: 843 | End: 2020-12-31
Attending: HOSPITALIST | Admitting: HOSPITALIST
Payer: MEDICARE

## 2020-01-01 VITALS
RESPIRATION RATE: 20 BRPM | SYSTOLIC BLOOD PRESSURE: 131 MMHG | DIASTOLIC BLOOD PRESSURE: 96 MMHG | TEMPERATURE: 97 F | HEART RATE: 127 BPM | OXYGEN SATURATION: 97 % | HEIGHT: 62 IN | WEIGHT: 125 LBS

## 2020-01-01 VITALS
TEMPERATURE: 98 F | DIASTOLIC BLOOD PRESSURE: 51 MMHG | HEART RATE: 88 BPM | OXYGEN SATURATION: 98 % | RESPIRATION RATE: 18 BRPM | SYSTOLIC BLOOD PRESSURE: 111 MMHG

## 2020-01-01 VITALS — HEIGHT: 64 IN | WEIGHT: 146 LBS | BODY MASS INDEX: 24.92 KG/M2 | HEART RATE: 95 BPM

## 2020-01-01 VITALS
DIASTOLIC BLOOD PRESSURE: 80 MMHG | OXYGEN SATURATION: 96 % | HEART RATE: 86 BPM | RESPIRATION RATE: 18 BRPM | SYSTOLIC BLOOD PRESSURE: 127 MMHG | TEMPERATURE: 98 F

## 2020-01-01 VITALS
HEART RATE: 91 BPM | HEIGHT: 62 IN | RESPIRATION RATE: 18 BRPM | SYSTOLIC BLOOD PRESSURE: 112 MMHG | TEMPERATURE: 98 F | WEIGHT: 121.03 LBS | OXYGEN SATURATION: 96 % | DIASTOLIC BLOOD PRESSURE: 58 MMHG

## 2020-01-01 VITALS
RESPIRATION RATE: 18 BRPM | HEIGHT: 62 IN | SYSTOLIC BLOOD PRESSURE: 102 MMHG | WEIGHT: 110.01 LBS | TEMPERATURE: 98 F | DIASTOLIC BLOOD PRESSURE: 62 MMHG | OXYGEN SATURATION: 98 % | HEART RATE: 103 BPM

## 2020-01-01 VITALS — WEIGHT: 146 LBS | HEIGHT: 64 IN | RESPIRATION RATE: 15 BRPM | BODY MASS INDEX: 24.92 KG/M2

## 2020-01-01 VITALS
DIASTOLIC BLOOD PRESSURE: 83 MMHG | HEART RATE: 87 BPM | TEMPERATURE: 98 F | OXYGEN SATURATION: 95 % | SYSTOLIC BLOOD PRESSURE: 129 MMHG | RESPIRATION RATE: 22 BRPM

## 2020-01-01 DIAGNOSIS — N39.0 URINARY TRACT INFECTION, SITE NOT SPECIFIED: ICD-10-CM

## 2020-01-01 DIAGNOSIS — F03.91 UNSPECIFIED DEMENTIA WITH BEHAVIORAL DISTURBANCE: ICD-10-CM

## 2020-01-01 DIAGNOSIS — S00.01XA ABRASION OF SCALP, INITIAL ENCOUNTER: ICD-10-CM

## 2020-01-01 DIAGNOSIS — Z71.89 OTHER SPECIFIED COUNSELING: ICD-10-CM

## 2020-01-01 DIAGNOSIS — C79.9 SECONDARY MALIGNANT NEOPLASM OF UNSPECIFIED SITE: ICD-10-CM

## 2020-01-01 DIAGNOSIS — F03.90 UNSPECIFIED DEMENTIA WITHOUT BEHAVIORAL DISTURBANCE: ICD-10-CM

## 2020-01-01 DIAGNOSIS — Z79.01 LONG TERM (CURRENT) USE OF ANTICOAGULANTS: ICD-10-CM

## 2020-01-01 DIAGNOSIS — Z51.81 ENCOUNTER FOR THERAPEUTIC DRUG LVL MONITORING: ICD-10-CM

## 2020-01-01 DIAGNOSIS — I48.91 UNSPECIFIED ATRIAL FIBRILLATION: ICD-10-CM

## 2020-01-01 DIAGNOSIS — Z79.899 OTHER LONG TERM (CURRENT) DRUG THERAPY: ICD-10-CM

## 2020-01-01 DIAGNOSIS — E78.00 PURE HYPERCHOLESTEROLEMIA, UNSPECIFIED: ICD-10-CM
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
## 2020-01-01 DIAGNOSIS — Z51.5 ENCOUNTER FOR PALLIATIVE CARE: ICD-10-CM

## 2020-01-01 DIAGNOSIS — Z79.01 ENCOUNTER FOR THERAPEUTIC DRUG LVL MONITORING: ICD-10-CM

## 2020-01-01 DIAGNOSIS — R62.7 ADULT FAILURE TO THRIVE: ICD-10-CM

## 2020-01-01 DIAGNOSIS — F03.90 UNSPECIFIED DEMENTIA, UNSPECIFIED SEVERITY, WITHOUT BEHAVIORAL DISTURBANCE, PSYCHOTIC DISTURBANCE, MOOD DISTURBANCE, AND ANXIETY: ICD-10-CM

## 2020-01-01 DIAGNOSIS — F05 DELIRIUM DUE TO KNOWN PHYSIOLOGICAL CONDITION: ICD-10-CM

## 2020-01-01 DIAGNOSIS — F20.9 SCHIZOPHRENIA, UNSPECIFIED: ICD-10-CM

## 2020-01-01 DIAGNOSIS — I10 ESSENTIAL (PRIMARY) HYPERTENSION: ICD-10-CM

## 2020-01-01 DIAGNOSIS — S22.080A WEDGE COMPRESSION FRACTURE OF T11-T12 VERTEBRA, INITIAL ENCOUNTER FOR CLOSED FRACTURE: ICD-10-CM

## 2020-01-01 LAB
ALBUMIN SERPL ELPH-MCNC: 3.4 G/DL — SIGNIFICANT CHANGE UP (ref 3.3–5)
ALBUMIN SERPL ELPH-MCNC: 3.4 G/DL — SIGNIFICANT CHANGE UP (ref 3.3–5)
ALBUMIN SERPL ELPH-MCNC: 4 G/DL — SIGNIFICANT CHANGE UP (ref 3.3–5)
ALBUMIN SERPL ELPH-MCNC: 4.2 G/DL — SIGNIFICANT CHANGE UP (ref 3.3–5)
ALP SERPL-CCNC: 156 U/L — HIGH (ref 40–120)
ALP SERPL-CCNC: 162 U/L — HIGH (ref 40–120)
ALP SERPL-CCNC: 408 U/L — HIGH (ref 40–120)
ALP SERPL-CCNC: 434 U/L — HIGH (ref 40–120)
ALT FLD-CCNC: 18 U/L — SIGNIFICANT CHANGE UP (ref 10–45)
ALT FLD-CCNC: 19 U/L — SIGNIFICANT CHANGE UP (ref 10–45)
ALT FLD-CCNC: 20 U/L — SIGNIFICANT CHANGE UP (ref 10–45)
ALT FLD-CCNC: 22 U/L — SIGNIFICANT CHANGE UP (ref 10–45)
ANION GAP SERPL CALC-SCNC: 10 MMOL/L — SIGNIFICANT CHANGE UP (ref 5–17)
ANION GAP SERPL CALC-SCNC: 11 MMOL/L — SIGNIFICANT CHANGE UP (ref 5–17)
ANION GAP SERPL CALC-SCNC: 11 MMOL/L — SIGNIFICANT CHANGE UP (ref 5–17)
ANION GAP SERPL CALC-SCNC: 12 MMOL/L — SIGNIFICANT CHANGE UP (ref 5–17)
ANION GAP SERPL CALC-SCNC: 13 MMOL/L — SIGNIFICANT CHANGE UP (ref 5–17)
ANION GAP SERPL CALC-SCNC: 14 MMOL/L — SIGNIFICANT CHANGE UP (ref 5–17)
APPEARANCE UR: ABNORMAL
APPEARANCE UR: ABNORMAL
APPEARANCE UR: CLEAR — SIGNIFICANT CHANGE UP
APTT BLD: 20.2 SEC — LOW (ref 27.5–36.3)
APTT BLD: 30.3 SEC — SIGNIFICANT CHANGE UP (ref 27.5–35.5)
APTT BLD: 30.9 SEC — SIGNIFICANT CHANGE UP (ref 27.5–36.3)
APTT BLD: 31.3 SEC — SIGNIFICANT CHANGE UP (ref 27.5–35.5)
APTT BLD: 31.9 SEC — SIGNIFICANT CHANGE UP (ref 27.5–35.5)
APTT BLD: 32 SEC — SIGNIFICANT CHANGE UP (ref 27.5–35.5)
APTT BLD: 32.3 SEC — SIGNIFICANT CHANGE UP (ref 27.5–35.5)
APTT BLD: 32.6 SEC — SIGNIFICANT CHANGE UP (ref 27.5–35.5)
APTT BLD: 36.9 SEC — HIGH (ref 27.5–35.5)
APTT BLD: 39.2 SEC — HIGH (ref 27.5–35.5)
AST SERPL-CCNC: 31 U/L — SIGNIFICANT CHANGE UP (ref 10–40)
AST SERPL-CCNC: 36 U/L — SIGNIFICANT CHANGE UP (ref 10–40)
AST SERPL-CCNC: 39 U/L — SIGNIFICANT CHANGE UP (ref 10–40)
AST SERPL-CCNC: 42 U/L — HIGH (ref 10–40)
BACTERIA # UR AUTO: NEGATIVE — SIGNIFICANT CHANGE UP
BASE EXCESS BLDV CALC-SCNC: 3.5 MMOL/L — HIGH (ref -2–2)
BASOPHILS # BLD AUTO: 0.1 K/UL — SIGNIFICANT CHANGE UP (ref 0–0.2)
BASOPHILS # BLD AUTO: 0.11 K/UL — SIGNIFICANT CHANGE UP (ref 0–0.2)
BASOPHILS # BLD AUTO: 0.12 K/UL — SIGNIFICANT CHANGE UP (ref 0–0.2)
BASOPHILS # BLD AUTO: 0.13 K/UL — SIGNIFICANT CHANGE UP (ref 0–0.2)
BASOPHILS # BLD AUTO: 0.13 K/UL — SIGNIFICANT CHANGE UP (ref 0–0.2)
BASOPHILS # BLD AUTO: 0.14 K/UL — SIGNIFICANT CHANGE UP (ref 0–0.2)
BASOPHILS NFR BLD AUTO: 0.8 % — SIGNIFICANT CHANGE UP (ref 0–2)
BASOPHILS NFR BLD AUTO: 0.8 % — SIGNIFICANT CHANGE UP (ref 0–2)
BASOPHILS NFR BLD AUTO: 0.9 % — SIGNIFICANT CHANGE UP (ref 0–2)
BASOPHILS NFR BLD AUTO: 0.9 % — SIGNIFICANT CHANGE UP (ref 0–2)
BASOPHILS NFR BLD AUTO: 1 % — SIGNIFICANT CHANGE UP (ref 0–2)
BASOPHILS NFR BLD AUTO: 1.1 % — SIGNIFICANT CHANGE UP (ref 0–2)
BCA 255 TISS QL IMSTN: 162 U/ML — HIGH
BILIRUB SERPL-MCNC: 0.2 MG/DL — SIGNIFICANT CHANGE UP (ref 0.2–1.2)
BILIRUB SERPL-MCNC: 0.3 MG/DL — SIGNIFICANT CHANGE UP (ref 0.2–1.2)
BILIRUB SERPL-MCNC: 0.3 MG/DL — SIGNIFICANT CHANGE UP (ref 0.2–1.2)
BILIRUB SERPL-MCNC: 0.4 MG/DL — SIGNIFICANT CHANGE UP (ref 0.2–1.2)
BILIRUB UR-MCNC: NEGATIVE — SIGNIFICANT CHANGE UP
BUN SERPL-MCNC: 13 MG/DL — SIGNIFICANT CHANGE UP (ref 7–23)
BUN SERPL-MCNC: 14 MG/DL — SIGNIFICANT CHANGE UP (ref 7–23)
BUN SERPL-MCNC: 15 MG/DL — SIGNIFICANT CHANGE UP (ref 7–23)
BUN SERPL-MCNC: 16 MG/DL — SIGNIFICANT CHANGE UP (ref 7–23)
BUN SERPL-MCNC: 17 MG/DL — SIGNIFICANT CHANGE UP (ref 7–23)
BUN SERPL-MCNC: 21 MG/DL — SIGNIFICANT CHANGE UP (ref 7–23)
BUN SERPL-MCNC: 23 MG/DL — SIGNIFICANT CHANGE UP (ref 7–23)
BUN SERPL-MCNC: 8 MG/DL — SIGNIFICANT CHANGE UP (ref 7–23)
CA-I SERPL-SCNC: 1.22 MMOL/L — SIGNIFICANT CHANGE UP (ref 1.12–1.3)
CALCIUM SERPL-MCNC: 10.3 MG/DL — SIGNIFICANT CHANGE UP (ref 8.4–10.5)
CALCIUM SERPL-MCNC: 10.7 MG/DL — HIGH (ref 8.4–10.5)
CALCIUM SERPL-MCNC: 10.7 MG/DL — HIGH (ref 8.4–10.5)
CALCIUM SERPL-MCNC: 10.8 MG/DL — HIGH (ref 8.4–10.5)
CALCIUM SERPL-MCNC: 10.9 MG/DL — HIGH (ref 8.4–10.5)
CALCIUM SERPL-MCNC: 10.9 MG/DL — HIGH (ref 8.4–10.5)
CALCIUM SERPL-MCNC: 9.8 MG/DL — SIGNIFICANT CHANGE UP (ref 8.4–10.5)
CALCIUM SERPL-MCNC: 9.9 MG/DL — SIGNIFICANT CHANGE UP (ref 8.4–10.5)
CANCER AG125 SERPL-ACNC: 43 U/ML — HIGH
CANCER AG19-9 SERPL-ACNC: 420 U/ML — HIGH
CEA SERPL-MCNC: 16.2 NG/ML — HIGH (ref 0–3.8)
CHLORIDE BLDV-SCNC: 109 MMOL/L — HIGH (ref 96–108)
CHLORIDE SERPL-SCNC: 100 MMOL/L — SIGNIFICANT CHANGE UP (ref 96–108)
CHLORIDE SERPL-SCNC: 103 MMOL/L — SIGNIFICANT CHANGE UP (ref 96–108)
CHLORIDE SERPL-SCNC: 104 MMOL/L — SIGNIFICANT CHANGE UP (ref 96–108)
CHLORIDE SERPL-SCNC: 104 MMOL/L — SIGNIFICANT CHANGE UP (ref 96–108)
CHLORIDE SERPL-SCNC: 105 MMOL/L — SIGNIFICANT CHANGE UP (ref 96–108)
CHLORIDE SERPL-SCNC: 97 MMOL/L — SIGNIFICANT CHANGE UP (ref 96–108)
CHLORIDE SERPL-SCNC: 99 MMOL/L — SIGNIFICANT CHANGE UP (ref 96–108)
CHLORIDE SERPL-SCNC: 99 MMOL/L — SIGNIFICANT CHANGE UP (ref 96–108)
CO2 BLDV-SCNC: 28 MMOL/L — SIGNIFICANT CHANGE UP (ref 22–30)
CO2 SERPL-SCNC: 23 MMOL/L — SIGNIFICANT CHANGE UP (ref 22–31)
CO2 SERPL-SCNC: 23 MMOL/L — SIGNIFICANT CHANGE UP (ref 22–31)
CO2 SERPL-SCNC: 24 MMOL/L — SIGNIFICANT CHANGE UP (ref 22–31)
CO2 SERPL-SCNC: 25 MMOL/L — SIGNIFICANT CHANGE UP (ref 22–31)
COD CRY URNS QL: ABNORMAL
COD CRY URNS QL: ABNORMAL
COLOR SPEC: YELLOW — SIGNIFICANT CHANGE UP
CREAT SERPL-MCNC: 0.75 MG/DL — SIGNIFICANT CHANGE UP (ref 0.5–1.3)
CREAT SERPL-MCNC: 0.79 MG/DL — SIGNIFICANT CHANGE UP (ref 0.5–1.3)
CREAT SERPL-MCNC: 0.79 MG/DL — SIGNIFICANT CHANGE UP (ref 0.5–1.3)
CREAT SERPL-MCNC: 0.81 MG/DL — SIGNIFICANT CHANGE UP (ref 0.5–1.3)
CREAT SERPL-MCNC: 0.86 MG/DL — SIGNIFICANT CHANGE UP (ref 0.5–1.3)
CREAT SERPL-MCNC: 0.96 MG/DL — SIGNIFICANT CHANGE UP (ref 0.5–1.3)
CREAT SERPL-MCNC: 1.05 MG/DL — SIGNIFICANT CHANGE UP (ref 0.5–1.3)
CREAT SERPL-MCNC: 1.2 MG/DL — SIGNIFICANT CHANGE UP (ref 0.5–1.3)
CULTURE RESULTS: NO GROWTH — SIGNIFICANT CHANGE UP
CULTURE RESULTS: SIGNIFICANT CHANGE UP
DIFF PNL FLD: NEGATIVE — SIGNIFICANT CHANGE UP
EOSINOPHIL # BLD AUTO: 0.17 K/UL — SIGNIFICANT CHANGE UP (ref 0–0.5)
EOSINOPHIL # BLD AUTO: 0.24 K/UL — SIGNIFICANT CHANGE UP (ref 0–0.5)
EOSINOPHIL # BLD AUTO: 0.28 K/UL — SIGNIFICANT CHANGE UP (ref 0–0.5)
EOSINOPHIL # BLD AUTO: 0.29 K/UL — SIGNIFICANT CHANGE UP (ref 0–0.5)
EOSINOPHIL # BLD AUTO: 0.47 K/UL — SIGNIFICANT CHANGE UP (ref 0–0.5)
EOSINOPHIL # BLD AUTO: 0.47 K/UL — SIGNIFICANT CHANGE UP (ref 0–0.5)
EOSINOPHIL NFR BLD AUTO: 1.1 % — SIGNIFICANT CHANGE UP (ref 0–6)
EOSINOPHIL NFR BLD AUTO: 1.9 % — SIGNIFICANT CHANGE UP (ref 0–6)
EOSINOPHIL NFR BLD AUTO: 2.2 % — SIGNIFICANT CHANGE UP (ref 0–6)
EOSINOPHIL NFR BLD AUTO: 2.3 % — SIGNIFICANT CHANGE UP (ref 0–6)
EOSINOPHIL NFR BLD AUTO: 3.3 % — SIGNIFICANT CHANGE UP (ref 0–6)
EOSINOPHIL NFR BLD AUTO: 3.7 % — SIGNIFICANT CHANGE UP (ref 0–6)
EPI CELLS # UR: 1 /HPF — SIGNIFICANT CHANGE UP
EPI CELLS # UR: 2 /HPF — SIGNIFICANT CHANGE UP
EPI CELLS # UR: 4 /HPF — SIGNIFICANT CHANGE UP
GAS PNL BLDV: 137 MMOL/L — SIGNIFICANT CHANGE UP (ref 135–145)
GAS PNL BLDV: SIGNIFICANT CHANGE UP
GLUCOSE BLDV-MCNC: 136 MG/DL — HIGH (ref 70–99)
GLUCOSE SERPL-MCNC: 104 MG/DL — HIGH (ref 70–99)
GLUCOSE SERPL-MCNC: 131 MG/DL — HIGH (ref 70–99)
GLUCOSE SERPL-MCNC: 134 MG/DL — HIGH (ref 70–99)
GLUCOSE SERPL-MCNC: 142 MG/DL — HIGH (ref 70–99)
GLUCOSE SERPL-MCNC: 156 MG/DL — HIGH (ref 70–99)
GLUCOSE SERPL-MCNC: 163 MG/DL — HIGH (ref 70–99)
GLUCOSE SERPL-MCNC: 226 MG/DL — HIGH (ref 70–99)
GLUCOSE SERPL-MCNC: 80 MG/DL — SIGNIFICANT CHANGE UP (ref 70–99)
GLUCOSE UR QL: ABNORMAL
GLUCOSE UR QL: NEGATIVE — SIGNIFICANT CHANGE UP
GLUCOSE UR QL: NEGATIVE — SIGNIFICANT CHANGE UP
HCO3 BLDV-SCNC: 27 MMOL/L — SIGNIFICANT CHANGE UP (ref 21–29)
HCT VFR BLD CALC: 35.7 % — SIGNIFICANT CHANGE UP (ref 34.5–45)
HCT VFR BLD CALC: 36.8 % — SIGNIFICANT CHANGE UP (ref 34.5–45)
HCT VFR BLD CALC: 37 % — SIGNIFICANT CHANGE UP (ref 34.5–45)
HCT VFR BLD CALC: 37.9 % — SIGNIFICANT CHANGE UP (ref 34.5–45)
HCT VFR BLD CALC: 38.2 % — SIGNIFICANT CHANGE UP (ref 34.5–45)
HCT VFR BLD CALC: 38.2 % — SIGNIFICANT CHANGE UP (ref 34.5–45)
HCT VFR BLD CALC: 39.4 % — SIGNIFICANT CHANGE UP (ref 34.5–45)
HCT VFR BLD CALC: 39.5 % — SIGNIFICANT CHANGE UP (ref 34.5–45)
HCT VFR BLD CALC: 40.5 % — SIGNIFICANT CHANGE UP (ref 34.5–45)
HCT VFR BLD CALC: 40.8 % — SIGNIFICANT CHANGE UP (ref 34.5–45)
HCT VFR BLD CALC: 41.9 % — SIGNIFICANT CHANGE UP (ref 34.5–45)
HCT VFR BLDA CALC: 42 % — SIGNIFICANT CHANGE UP (ref 39–50)
HGB BLD CALC-MCNC: 13.8 G/DL — SIGNIFICANT CHANGE UP (ref 11.5–15.5)
HGB BLD-MCNC: 11 G/DL — LOW (ref 11.5–15.5)
HGB BLD-MCNC: 11.5 G/DL — SIGNIFICANT CHANGE UP (ref 11.5–15.5)
HGB BLD-MCNC: 11.5 G/DL — SIGNIFICANT CHANGE UP (ref 11.5–15.5)
HGB BLD-MCNC: 11.9 G/DL — SIGNIFICANT CHANGE UP (ref 11.5–15.5)
HGB BLD-MCNC: 12 G/DL — SIGNIFICANT CHANGE UP (ref 11.5–15.5)
HGB BLD-MCNC: 12.2 G/DL — SIGNIFICANT CHANGE UP (ref 11.5–15.5)
HGB BLD-MCNC: 12.2 G/DL — SIGNIFICANT CHANGE UP (ref 11.5–15.5)
HGB BLD-MCNC: 12.5 G/DL — SIGNIFICANT CHANGE UP (ref 11.5–15.5)
HGB BLD-MCNC: 12.9 G/DL — SIGNIFICANT CHANGE UP (ref 11.5–15.5)
HGB BLD-MCNC: 13 G/DL — SIGNIFICANT CHANGE UP (ref 11.5–15.5)
HGB BLD-MCNC: 13.1 G/DL — SIGNIFICANT CHANGE UP (ref 11.5–15.5)
HYALINE CASTS # UR AUTO: 2 /LPF — SIGNIFICANT CHANGE UP (ref 0–2)
HYALINE CASTS # UR AUTO: 27 /LPF — HIGH (ref 0–2)
HYALINE CASTS # UR AUTO: 3 /LPF — HIGH (ref 0–2)
IMM GRANULOCYTES NFR BLD AUTO: 0.4 % — SIGNIFICANT CHANGE UP (ref 0–1.5)
IMM GRANULOCYTES NFR BLD AUTO: 0.4 % — SIGNIFICANT CHANGE UP (ref 0–1.5)
IMM GRANULOCYTES NFR BLD AUTO: 0.5 % — SIGNIFICANT CHANGE UP (ref 0–1.5)
IMM GRANULOCYTES NFR BLD AUTO: 0.6 % — SIGNIFICANT CHANGE UP (ref 0–1.5)
IMM GRANULOCYTES NFR BLD AUTO: 0.6 % — SIGNIFICANT CHANGE UP (ref 0–1.5)
IMM GRANULOCYTES NFR BLD AUTO: 0.7 % — SIGNIFICANT CHANGE UP (ref 0–1.5)
INR BLD: 1.29 RATIO — HIGH (ref 0.88–1.16)
INR BLD: 1.33 RATIO — HIGH (ref 0.88–1.16)
INR BLD: 1.4 RATIO — HIGH (ref 0.88–1.16)
INR BLD: 1.47 RATIO — HIGH (ref 0.88–1.16)
INR BLD: 1.49 RATIO — HIGH (ref 0.88–1.16)
INR BLD: 1.58 RATIO — HIGH (ref 0.88–1.16)
INR BLD: 1.67 RATIO — HIGH (ref 0.88–1.16)
INR BLD: 1.79 RATIO — HIGH (ref 0.88–1.16)
INR BLD: 1.83 RATIO — HIGH (ref 0.88–1.16)
INR BLD: 1.89 RATIO — HIGH (ref 0.88–1.16)
INR BLD: 2.06 RATIO — HIGH (ref 0.88–1.16)
INR BLD: 2.35 RATIO — HIGH (ref 0.88–1.16)
INR BLD: 3.2 RATIO — HIGH (ref 0.88–1.16)
INR PPP: 1.2
INR PPP: 1.4
INR PPP: 1.5
INR PPP: 2
INR PPP: 2 RATIO
INR PPP: 2.1
INR PPP: 2.2
INR PPP: 2.3
INR PPP: 2.7
KETONES UR-MCNC: NEGATIVE — SIGNIFICANT CHANGE UP
KETONES UR-MCNC: SIGNIFICANT CHANGE UP
KETONES UR-MCNC: SIGNIFICANT CHANGE UP
LACTATE BLDV-MCNC: 2.5 MMOL/L — HIGH (ref 0.7–2)
LEUKOCYTE ESTERASE UR-ACNC: ABNORMAL
LEUKOCYTE ESTERASE UR-ACNC: NEGATIVE — SIGNIFICANT CHANGE UP
LEUKOCYTE ESTERASE UR-ACNC: NEGATIVE — SIGNIFICANT CHANGE UP
LYMPHOCYTES # BLD AUTO: 1.71 K/UL — SIGNIFICANT CHANGE UP (ref 1–3.3)
LYMPHOCYTES # BLD AUTO: 1.76 K/UL — SIGNIFICANT CHANGE UP (ref 1–3.3)
LYMPHOCYTES # BLD AUTO: 1.8 K/UL — SIGNIFICANT CHANGE UP (ref 1–3.3)
LYMPHOCYTES # BLD AUTO: 1.94 K/UL — SIGNIFICANT CHANGE UP (ref 1–3.3)
LYMPHOCYTES # BLD AUTO: 11.5 % — LOW (ref 13–44)
LYMPHOCYTES # BLD AUTO: 12.2 % — LOW (ref 13–44)
LYMPHOCYTES # BLD AUTO: 14.4 % — SIGNIFICANT CHANGE UP (ref 13–44)
LYMPHOCYTES # BLD AUTO: 15.3 % — SIGNIFICANT CHANGE UP (ref 13–44)
LYMPHOCYTES # BLD AUTO: 15.9 % — SIGNIFICANT CHANGE UP (ref 13–44)
LYMPHOCYTES # BLD AUTO: 2.04 K/UL — SIGNIFICANT CHANGE UP (ref 1–3.3)
LYMPHOCYTES # BLD AUTO: 2.53 K/UL — SIGNIFICANT CHANGE UP (ref 1–3.3)
LYMPHOCYTES # BLD AUTO: 20 % — SIGNIFICANT CHANGE UP (ref 13–44)
MAGNESIUM SERPL-MCNC: 1.9 MG/DL — SIGNIFICANT CHANGE UP (ref 1.6–2.6)
MCHC RBC-ENTMCNC: 28.4 PG — SIGNIFICANT CHANGE UP (ref 27–34)
MCHC RBC-ENTMCNC: 28.6 PG — SIGNIFICANT CHANGE UP (ref 27–34)
MCHC RBC-ENTMCNC: 28.6 PG — SIGNIFICANT CHANGE UP (ref 27–34)
MCHC RBC-ENTMCNC: 28.9 PG — SIGNIFICANT CHANGE UP (ref 27–34)
MCHC RBC-ENTMCNC: 29 PG — SIGNIFICANT CHANGE UP (ref 27–34)
MCHC RBC-ENTMCNC: 29 PG — SIGNIFICANT CHANGE UP (ref 27–34)
MCHC RBC-ENTMCNC: 29.1 PG — SIGNIFICANT CHANGE UP (ref 27–34)
MCHC RBC-ENTMCNC: 29.1 PG — SIGNIFICANT CHANGE UP (ref 27–34)
MCHC RBC-ENTMCNC: 29.3 PG — SIGNIFICANT CHANGE UP (ref 27–34)
MCHC RBC-ENTMCNC: 29.3 PG — SIGNIFICANT CHANGE UP (ref 27–34)
MCHC RBC-ENTMCNC: 29.5 PG — SIGNIFICANT CHANGE UP (ref 27–34)
MCHC RBC-ENTMCNC: 30.8 GM/DL — LOW (ref 32–36)
MCHC RBC-ENTMCNC: 30.8 GM/DL — LOW (ref 32–36)
MCHC RBC-ENTMCNC: 31 GM/DL — LOW (ref 32–36)
MCHC RBC-ENTMCNC: 31.1 GM/DL — LOW (ref 32–36)
MCHC RBC-ENTMCNC: 31.2 GM/DL — LOW (ref 32–36)
MCHC RBC-ENTMCNC: 31.3 GM/DL — LOW (ref 32–36)
MCHC RBC-ENTMCNC: 31.6 GM/DL — LOW (ref 32–36)
MCHC RBC-ENTMCNC: 31.7 GM/DL — LOW (ref 32–36)
MCHC RBC-ENTMCNC: 31.9 GM/DL — LOW (ref 32–36)
MCHC RBC-ENTMCNC: 32.1 GM/DL — SIGNIFICANT CHANGE UP (ref 32–36)
MCHC RBC-ENTMCNC: 32.1 GM/DL — SIGNIFICANT CHANGE UP (ref 32–36)
MCV RBC AUTO: 90.7 FL — SIGNIFICANT CHANGE UP (ref 80–100)
MCV RBC AUTO: 90.7 FL — SIGNIFICANT CHANGE UP (ref 80–100)
MCV RBC AUTO: 90.9 FL — SIGNIFICANT CHANGE UP (ref 80–100)
MCV RBC AUTO: 91.8 FL — SIGNIFICANT CHANGE UP (ref 80–100)
MCV RBC AUTO: 91.8 FL — SIGNIFICANT CHANGE UP (ref 80–100)
MCV RBC AUTO: 92.7 FL — SIGNIFICANT CHANGE UP (ref 80–100)
MCV RBC AUTO: 92.7 FL — SIGNIFICANT CHANGE UP (ref 80–100)
MCV RBC AUTO: 93 FL — SIGNIFICANT CHANGE UP (ref 80–100)
MCV RBC AUTO: 93 FL — SIGNIFICANT CHANGE UP (ref 80–100)
MCV RBC AUTO: 93.6 FL — SIGNIFICANT CHANGE UP (ref 80–100)
MCV RBC AUTO: 94.4 FL — SIGNIFICANT CHANGE UP (ref 80–100)
MONOCYTES # BLD AUTO: 0.97 K/UL — HIGH (ref 0–0.9)
MONOCYTES # BLD AUTO: 1.11 K/UL — HIGH (ref 0–0.9)
MONOCYTES # BLD AUTO: 1.14 K/UL — HIGH (ref 0–0.9)
MONOCYTES # BLD AUTO: 1.14 K/UL — HIGH (ref 0–0.9)
MONOCYTES # BLD AUTO: 1.21 K/UL — HIGH (ref 0–0.9)
MONOCYTES # BLD AUTO: 1.31 K/UL — HIGH (ref 0–0.9)
MONOCYTES NFR BLD AUTO: 7.7 % — SIGNIFICANT CHANGE UP (ref 2–14)
MONOCYTES NFR BLD AUTO: 8.4 % — SIGNIFICANT CHANGE UP (ref 2–14)
MONOCYTES NFR BLD AUTO: 8.7 % — SIGNIFICANT CHANGE UP (ref 2–14)
MONOCYTES NFR BLD AUTO: 8.8 % — SIGNIFICANT CHANGE UP (ref 2–14)
MONOCYTES NFR BLD AUTO: 9 % — SIGNIFICANT CHANGE UP (ref 2–14)
MONOCYTES NFR BLD AUTO: 9 % — SIGNIFICANT CHANGE UP (ref 2–14)
NEUTROPHILS # BLD AUTO: 10.75 K/UL — HIGH (ref 1.8–7.4)
NEUTROPHILS # BLD AUTO: 11.51 K/UL — HIGH (ref 1.8–7.4)
NEUTROPHILS # BLD AUTO: 8.33 K/UL — HIGH (ref 1.8–7.4)
NEUTROPHILS # BLD AUTO: 9.11 K/UL — HIGH (ref 1.8–7.4)
NEUTROPHILS # BLD AUTO: 9.15 K/UL — HIGH (ref 1.8–7.4)
NEUTROPHILS # BLD AUTO: 9.32 K/UL — HIGH (ref 1.8–7.4)
NEUTROPHILS NFR BLD AUTO: 65.9 % — SIGNIFICANT CHANGE UP (ref 43–77)
NEUTROPHILS NFR BLD AUTO: 71.5 % — SIGNIFICANT CHANGE UP (ref 43–77)
NEUTROPHILS NFR BLD AUTO: 72.1 % — SIGNIFICANT CHANGE UP (ref 43–77)
NEUTROPHILS NFR BLD AUTO: 74.5 % — SIGNIFICANT CHANGE UP (ref 43–77)
NEUTROPHILS NFR BLD AUTO: 74.8 % — SIGNIFICANT CHANGE UP (ref 43–77)
NEUTROPHILS NFR BLD AUTO: 77.1 % — HIGH (ref 43–77)
NITRITE UR-MCNC: NEGATIVE — SIGNIFICANT CHANGE UP
NRBC # BLD: 0 /100 WBCS — SIGNIFICANT CHANGE UP (ref 0–0)
PCO2 BLDV: 38 MMHG — SIGNIFICANT CHANGE UP (ref 35–50)
PH BLDV: 7.47 — HIGH (ref 7.35–7.45)
PH UR: 5.5 — SIGNIFICANT CHANGE UP (ref 5–8)
PH UR: 5.5 — SIGNIFICANT CHANGE UP (ref 5–8)
PH UR: 6 — SIGNIFICANT CHANGE UP (ref 5–8)
PHOSPHATE SERPL-MCNC: 2.7 MG/DL — SIGNIFICANT CHANGE UP (ref 2.5–4.5)
PLATELET # BLD AUTO: 213 K/UL — SIGNIFICANT CHANGE UP (ref 150–400)
PLATELET # BLD AUTO: 214 K/UL — SIGNIFICANT CHANGE UP (ref 150–400)
PLATELET # BLD AUTO: 217 K/UL — SIGNIFICANT CHANGE UP (ref 150–400)
PLATELET # BLD AUTO: 225 K/UL — SIGNIFICANT CHANGE UP (ref 150–400)
PLATELET # BLD AUTO: 228 K/UL — SIGNIFICANT CHANGE UP (ref 150–400)
PLATELET # BLD AUTO: 240 K/UL — SIGNIFICANT CHANGE UP (ref 150–400)
PLATELET # BLD AUTO: 280 K/UL — SIGNIFICANT CHANGE UP (ref 150–400)
PLATELET # BLD AUTO: 291 K/UL — SIGNIFICANT CHANGE UP (ref 150–400)
PLATELET # BLD AUTO: 292 K/UL — SIGNIFICANT CHANGE UP (ref 150–400)
PLATELET # BLD AUTO: 297 K/UL — SIGNIFICANT CHANGE UP (ref 150–400)
PLATELET # BLD AUTO: 322 K/UL — SIGNIFICANT CHANGE UP (ref 150–400)
PO2 BLDV: 36 MMHG — SIGNIFICANT CHANGE UP (ref 25–45)
POTASSIUM BLDV-SCNC: 3.8 MMOL/L — SIGNIFICANT CHANGE UP (ref 3.5–5.3)
POTASSIUM SERPL-MCNC: 3.7 MMOL/L — SIGNIFICANT CHANGE UP (ref 3.5–5.3)
POTASSIUM SERPL-MCNC: 3.8 MMOL/L — SIGNIFICANT CHANGE UP (ref 3.5–5.3)
POTASSIUM SERPL-MCNC: 4.3 MMOL/L — SIGNIFICANT CHANGE UP (ref 3.5–5.3)
POTASSIUM SERPL-MCNC: 4.4 MMOL/L — SIGNIFICANT CHANGE UP (ref 3.5–5.3)
POTASSIUM SERPL-MCNC: 4.5 MMOL/L — SIGNIFICANT CHANGE UP (ref 3.5–5.3)
POTASSIUM SERPL-MCNC: 4.5 MMOL/L — SIGNIFICANT CHANGE UP (ref 3.5–5.3)
POTASSIUM SERPL-SCNC: 3.7 MMOL/L — SIGNIFICANT CHANGE UP (ref 3.5–5.3)
POTASSIUM SERPL-SCNC: 3.8 MMOL/L — SIGNIFICANT CHANGE UP (ref 3.5–5.3)
POTASSIUM SERPL-SCNC: 4.3 MMOL/L — SIGNIFICANT CHANGE UP (ref 3.5–5.3)
POTASSIUM SERPL-SCNC: 4.4 MMOL/L — SIGNIFICANT CHANGE UP (ref 3.5–5.3)
POTASSIUM SERPL-SCNC: 4.5 MMOL/L — SIGNIFICANT CHANGE UP (ref 3.5–5.3)
POTASSIUM SERPL-SCNC: 4.5 MMOL/L — SIGNIFICANT CHANGE UP (ref 3.5–5.3)
PROT SERPL-MCNC: 7 G/DL — SIGNIFICANT CHANGE UP (ref 6–8.3)
PROT SERPL-MCNC: 7.2 G/DL — SIGNIFICANT CHANGE UP (ref 6–8.3)
PROT SERPL-MCNC: 7.4 G/DL — SIGNIFICANT CHANGE UP (ref 6–8.3)
PROT SERPL-MCNC: 7.4 G/DL — SIGNIFICANT CHANGE UP (ref 6–8.3)
PROT UR-MCNC: ABNORMAL
PROTHROM AB SERPL-ACNC: 15.3 SEC — HIGH (ref 10.6–13.6)
PROTHROM AB SERPL-ACNC: 15.8 SEC — HIGH (ref 10.6–13.6)
PROTHROM AB SERPL-ACNC: 16.5 SEC — HIGH (ref 10.6–13.6)
PROTHROM AB SERPL-ACNC: 17.2 SEC — HIGH (ref 10.6–13.6)
PROTHROM AB SERPL-ACNC: 17.3 SEC — HIGH (ref 10.6–13.6)
PROTHROM AB SERPL-ACNC: 18.3 SEC — HIGH (ref 10–12.9)
PROTHROM AB SERPL-ACNC: 19.3 SEC — HIGH (ref 10–12.9)
PROTHROM AB SERPL-ACNC: 20.5 SEC — HIGH (ref 10.6–13.6)
PROTHROM AB SERPL-ACNC: 21 SEC — HIGH (ref 10.6–13.6)
PROTHROM AB SERPL-ACNC: 21.8 SEC — HIGH (ref 10.6–13.6)
PROTHROM AB SERPL-ACNC: 23.7 SEC — HIGH (ref 10.6–13.6)
PROTHROM AB SERPL-ACNC: 27.1 SEC — HIGH (ref 10.6–13.6)
PROTHROM AB SERPL-ACNC: 36.3 SEC — HIGH (ref 10.6–13.6)
QUALITY CONTROL: YES
RBC # BLD: 3.84 M/UL — SIGNIFICANT CHANGE UP (ref 3.8–5.2)
RBC # BLD: 3.98 M/UL — SIGNIFICANT CHANGE UP (ref 3.8–5.2)
RBC # BLD: 4.05 M/UL — SIGNIFICANT CHANGE UP (ref 3.8–5.2)
RBC # BLD: 4.09 M/UL — SIGNIFICANT CHANGE UP (ref 3.8–5.2)
RBC # BLD: 4.16 M/UL — SIGNIFICANT CHANGE UP (ref 3.8–5.2)
RBC # BLD: 4.21 M/UL — SIGNIFICANT CHANGE UP (ref 3.8–5.2)
RBC # BLD: 4.21 M/UL — SIGNIFICANT CHANGE UP (ref 3.8–5.2)
RBC # BLD: 4.26 M/UL — SIGNIFICANT CHANGE UP (ref 3.8–5.2)
RBC # BLD: 4.41 M/UL — SIGNIFICANT CHANGE UP (ref 3.8–5.2)
RBC # BLD: 4.44 M/UL — SIGNIFICANT CHANGE UP (ref 3.8–5.2)
RBC # BLD: 4.5 M/UL — SIGNIFICANT CHANGE UP (ref 3.8–5.2)
RBC # FLD: 13.2 % — SIGNIFICANT CHANGE UP (ref 10.3–14.5)
RBC # FLD: 13.8 % — SIGNIFICANT CHANGE UP (ref 10.3–14.5)
RBC # FLD: 13.9 % — SIGNIFICANT CHANGE UP (ref 10.3–14.5)
RBC # FLD: 13.9 % — SIGNIFICANT CHANGE UP (ref 10.3–14.5)
RBC # FLD: 14 % — SIGNIFICANT CHANGE UP (ref 10.3–14.5)
RBC # FLD: 14.6 % — HIGH (ref 10.3–14.5)
RBC # FLD: 14.6 % — HIGH (ref 10.3–14.5)
RBC # FLD: 14.7 % — HIGH (ref 10.3–14.5)
RBC # FLD: 14.7 % — HIGH (ref 10.3–14.5)
RBC # FLD: 14.8 % — HIGH (ref 10.3–14.5)
RBC # FLD: 14.9 % — HIGH (ref 10.3–14.5)
RBC CASTS # UR COMP ASSIST: 0 /HPF — SIGNIFICANT CHANGE UP (ref 0–4)
RBC CASTS # UR COMP ASSIST: 1 /HPF — SIGNIFICANT CHANGE UP (ref 0–4)
RBC CASTS # UR COMP ASSIST: 3 /HPF — SIGNIFICANT CHANGE UP (ref 0–4)
SAO2 % BLDV: 70 % — SIGNIFICANT CHANGE UP (ref 67–88)
SARS-COV-2 IGG SERPL QL IA: NEGATIVE — SIGNIFICANT CHANGE UP
SARS-COV-2 IGG SERPL QL IA: NEGATIVE — SIGNIFICANT CHANGE UP
SARS-COV-2 IGM SERPL IA-ACNC: 0.06 INDEX — SIGNIFICANT CHANGE UP
SARS-COV-2 IGM SERPL IA-ACNC: <3.8 AU/ML — SIGNIFICANT CHANGE UP
SARS-COV-2 RNA SPEC QL NAA+PROBE: SIGNIFICANT CHANGE UP
SODIUM SERPL-SCNC: 133 MMOL/L — LOW (ref 135–145)
SODIUM SERPL-SCNC: 134 MMOL/L — LOW (ref 135–145)
SODIUM SERPL-SCNC: 135 MMOL/L — SIGNIFICANT CHANGE UP (ref 135–145)
SODIUM SERPL-SCNC: 137 MMOL/L — SIGNIFICANT CHANGE UP (ref 135–145)
SODIUM SERPL-SCNC: 139 MMOL/L — SIGNIFICANT CHANGE UP (ref 135–145)
SODIUM SERPL-SCNC: 139 MMOL/L — SIGNIFICANT CHANGE UP (ref 135–145)
SODIUM SERPL-SCNC: 141 MMOL/L — SIGNIFICANT CHANGE UP (ref 135–145)
SODIUM SERPL-SCNC: 141 MMOL/L — SIGNIFICANT CHANGE UP (ref 135–145)
SP GR SPEC: 1.03 — HIGH (ref 1.01–1.02)
SPECIMEN SOURCE: SIGNIFICANT CHANGE UP
TROPONIN T, HIGH SENSITIVITY RESULT: 15 NG/L — SIGNIFICANT CHANGE UP (ref 0–51)
TROPONIN T, HIGH SENSITIVITY RESULT: 16 NG/L — SIGNIFICANT CHANGE UP (ref 0–51)
UROBILINOGEN FLD QL: ABNORMAL
WBC # BLD: 10.93 K/UL — HIGH (ref 3.8–10.5)
WBC # BLD: 11.48 K/UL — HIGH (ref 3.8–10.5)
WBC # BLD: 12.52 K/UL — HIGH (ref 3.8–10.5)
WBC # BLD: 12.58 K/UL — HIGH (ref 3.8–10.5)
WBC # BLD: 12.64 K/UL — HIGH (ref 3.8–10.5)
WBC # BLD: 12.64 K/UL — HIGH (ref 3.8–10.5)
WBC # BLD: 12.8 K/UL — HIGH (ref 3.8–10.5)
WBC # BLD: 13.77 K/UL — HIGH (ref 3.8–10.5)
WBC # BLD: 14.2 K/UL — HIGH (ref 3.8–10.5)
WBC # BLD: 14.38 K/UL — HIGH (ref 3.8–10.5)
WBC # BLD: 14.92 K/UL — HIGH (ref 3.8–10.5)
WBC # FLD AUTO: 10.93 K/UL — HIGH (ref 3.8–10.5)
WBC # FLD AUTO: 11.48 K/UL — HIGH (ref 3.8–10.5)
WBC # FLD AUTO: 12.52 K/UL — HIGH (ref 3.8–10.5)
WBC # FLD AUTO: 12.58 K/UL — HIGH (ref 3.8–10.5)
WBC # FLD AUTO: 12.64 K/UL — HIGH (ref 3.8–10.5)
WBC # FLD AUTO: 12.64 K/UL — HIGH (ref 3.8–10.5)
WBC # FLD AUTO: 12.8 K/UL — HIGH (ref 3.8–10.5)
WBC # FLD AUTO: 13.77 K/UL — HIGH (ref 3.8–10.5)
WBC # FLD AUTO: 14.2 K/UL — HIGH (ref 3.8–10.5)
WBC # FLD AUTO: 14.38 K/UL — HIGH (ref 3.8–10.5)
WBC # FLD AUTO: 14.92 K/UL — HIGH (ref 3.8–10.5)
WBC UR QL: 1 /HPF — SIGNIFICANT CHANGE UP (ref 0–5)
WBC UR QL: 2 /HPF — SIGNIFICANT CHANGE UP (ref 0–5)
WBC UR QL: 243 /HPF — HIGH (ref 0–5)

## 2020-01-01 PROCEDURE — 99222 1ST HOSP IP/OBS MODERATE 55: CPT | Mod: GC

## 2020-01-01 PROCEDURE — 85027 COMPLETE CBC AUTOMATED: CPT

## 2020-01-01 PROCEDURE — 85730 THROMBOPLASTIN TIME PARTIAL: CPT

## 2020-01-01 PROCEDURE — 97161 PT EVAL LOW COMPLEX 20 MIN: CPT

## 2020-01-01 PROCEDURE — 71045 X-RAY EXAM CHEST 1 VIEW: CPT

## 2020-01-01 PROCEDURE — 84295 ASSAY OF SERUM SODIUM: CPT

## 2020-01-01 PROCEDURE — 82435 ASSAY OF BLOOD CHLORIDE: CPT

## 2020-01-01 PROCEDURE — 93005 ELECTROCARDIOGRAM TRACING: CPT

## 2020-01-01 PROCEDURE — 99223 1ST HOSP IP/OBS HIGH 75: CPT

## 2020-01-01 PROCEDURE — 99285 EMERGENCY DEPT VISIT HI MDM: CPT | Mod: 25

## 2020-01-01 PROCEDURE — 85610 PROTHROMBIN TIME: CPT | Mod: QW

## 2020-01-01 PROCEDURE — 82962 GLUCOSE BLOOD TEST: CPT

## 2020-01-01 PROCEDURE — 85018 HEMOGLOBIN: CPT

## 2020-01-01 PROCEDURE — 93005 ELECTROCARDIOGRAM TRACING: CPT | Mod: XU

## 2020-01-01 PROCEDURE — 82803 BLOOD GASES ANY COMBINATION: CPT

## 2020-01-01 PROCEDURE — 82330 ASSAY OF CALCIUM: CPT

## 2020-01-01 PROCEDURE — 99285 EMERGENCY DEPT VISIT HI MDM: CPT | Mod: CS,GC

## 2020-01-01 PROCEDURE — 87086 URINE CULTURE/COLONY COUNT: CPT

## 2020-01-01 PROCEDURE — 85610 PROTHROMBIN TIME: CPT

## 2020-01-01 PROCEDURE — 86769 SARS-COV-2 COVID-19 ANTIBODY: CPT

## 2020-01-01 PROCEDURE — 12345: CPT | Mod: NC

## 2020-01-01 PROCEDURE — 51701 INSERT BLADDER CATHETER: CPT

## 2020-01-01 PROCEDURE — 81001 URINALYSIS AUTO W/SCOPE: CPT

## 2020-01-01 PROCEDURE — 99232 SBSQ HOSP IP/OBS MODERATE 35: CPT

## 2020-01-01 PROCEDURE — 71260 CT THORAX DX C+: CPT | Mod: 26

## 2020-01-01 PROCEDURE — 84132 ASSAY OF SERUM POTASSIUM: CPT

## 2020-01-01 PROCEDURE — 87040 BLOOD CULTURE FOR BACTERIA: CPT

## 2020-01-01 PROCEDURE — 83605 ASSAY OF LACTIC ACID: CPT

## 2020-01-01 PROCEDURE — 80048 BASIC METABOLIC PNL TOTAL CA: CPT

## 2020-01-01 PROCEDURE — 99284 EMERGENCY DEPT VISIT MOD MDM: CPT | Mod: CS,GC

## 2020-01-01 PROCEDURE — 93010 ELECTROCARDIOGRAM REPORT: CPT | Mod: GC

## 2020-01-01 PROCEDURE — 99223 1ST HOSP IP/OBS HIGH 75: CPT | Mod: GC

## 2020-01-01 PROCEDURE — 71045 X-RAY EXAM CHEST 1 VIEW: CPT | Mod: 26

## 2020-01-01 PROCEDURE — 72125 CT NECK SPINE W/O DYE: CPT | Mod: 26

## 2020-01-01 PROCEDURE — 82565 ASSAY OF CREATININE: CPT

## 2020-01-01 PROCEDURE — 82947 ASSAY GLUCOSE BLOOD QUANT: CPT

## 2020-01-01 PROCEDURE — 70450 CT HEAD/BRAIN W/O DYE: CPT

## 2020-01-01 PROCEDURE — 80053 COMPREHEN METABOLIC PANEL: CPT

## 2020-01-01 PROCEDURE — 74177 CT ABD & PELVIS W/CONTRAST: CPT | Mod: 26

## 2020-01-01 PROCEDURE — 99497 ADVNCD CARE PLAN 30 MIN: CPT | Mod: 25

## 2020-01-01 PROCEDURE — 36415 COLL VENOUS BLD VENIPUNCTURE: CPT

## 2020-01-01 PROCEDURE — 99239 HOSP IP/OBS DSCHRG MGMT >30: CPT

## 2020-01-01 PROCEDURE — 99284 EMERGENCY DEPT VISIT MOD MDM: CPT | Mod: 25

## 2020-01-01 PROCEDURE — 72170 X-RAY EXAM OF PELVIS: CPT | Mod: 26

## 2020-01-01 PROCEDURE — 85014 HEMATOCRIT: CPT

## 2020-01-01 PROCEDURE — 93793 ANTICOAG MGMT PT WARFARIN: CPT

## 2020-01-01 PROCEDURE — 70450 CT HEAD/BRAIN W/O DYE: CPT | Mod: 26

## 2020-01-01 PROCEDURE — 96374 THER/PROPH/DIAG INJ IV PUSH: CPT

## 2020-01-01 PROCEDURE — 84484 ASSAY OF TROPONIN QUANT: CPT

## 2020-01-01 RX ORDER — DOCUSATE SODIUM 100 MG
2 CAPSULE ORAL
Qty: 0 | Refills: 0 | DISCHARGE

## 2020-01-01 RX ORDER — AMLODIPINE BESYLATE 2.5 MG/1
1 TABLET ORAL
Qty: 0 | Refills: 0 | DISCHARGE

## 2020-01-01 RX ORDER — DIVALPROEX SODIUM 500 MG/1
125 TABLET, DELAYED RELEASE ORAL
Refills: 0 | Status: DISCONTINUED | OUTPATIENT
Start: 2020-01-01 | End: 2020-01-01

## 2020-01-01 RX ORDER — ATORVASTATIN CALCIUM 80 MG/1
20 TABLET, FILM COATED ORAL AT BEDTIME
Refills: 0 | Status: DISCONTINUED | OUTPATIENT
Start: 2020-01-01 | End: 2020-01-01

## 2020-01-01 RX ORDER — OMEPRAZOLE 10 MG/1
1 CAPSULE, DELAYED RELEASE ORAL
Qty: 0 | Refills: 0 | DISCHARGE

## 2020-01-01 RX ORDER — SERTRALINE 25 MG/1
1 TABLET, FILM COATED ORAL
Qty: 0 | Refills: 0 | DISCHARGE

## 2020-01-01 RX ORDER — ACETAMINOPHEN 500 MG
650 TABLET ORAL EVERY 4 HOURS
Refills: 0 | Status: DISCONTINUED | OUTPATIENT
Start: 2020-01-01 | End: 2020-01-01

## 2020-01-01 RX ORDER — ACETAMINOPHEN 500 MG
650 TABLET ORAL ONCE
Refills: 0 | Status: COMPLETED | OUTPATIENT
Start: 2020-01-01 | End: 2020-01-01

## 2020-01-01 RX ORDER — WARFARIN SODIUM 2.5 MG/1
4 TABLET ORAL ONCE
Refills: 0 | Status: COMPLETED | OUTPATIENT
Start: 2020-01-01 | End: 2020-01-01

## 2020-01-01 RX ORDER — DIVALPROEX SODIUM 500 MG/1
1 TABLET, DELAYED RELEASE ORAL
Qty: 0 | Refills: 0 | DISCHARGE

## 2020-01-01 RX ORDER — BISOPROLOL FUMARATE 10 MG/1
1 TABLET, FILM COATED ORAL
Qty: 0 | Refills: 0 | DISCHARGE

## 2020-01-01 RX ORDER — WARFARIN SODIUM 2.5 MG/1
2 TABLET ORAL ONCE
Refills: 0 | Status: COMPLETED | OUTPATIENT
Start: 2020-01-01 | End: 2020-01-01

## 2020-01-01 RX ORDER — AMLODIPINE BESYLATE 2.5 MG/1
5 TABLET ORAL DAILY
Refills: 0 | Status: DISCONTINUED | OUTPATIENT
Start: 2020-01-01 | End: 2020-01-01

## 2020-01-01 RX ORDER — PANTOPRAZOLE SODIUM 20 MG/1
40 TABLET, DELAYED RELEASE ORAL
Refills: 0 | Status: DISCONTINUED | OUTPATIENT
Start: 2020-01-01 | End: 2020-01-01

## 2020-01-01 RX ORDER — MEMANTINE HYDROCHLORIDE 10 MG/1
1 TABLET ORAL
Qty: 60 | Refills: 0
Start: 2020-01-01 | End: 2021-01-01

## 2020-01-01 RX ORDER — WARFARIN SODIUM 2.5 MG/1
3 TABLET ORAL ONCE
Refills: 0 | Status: COMPLETED | OUTPATIENT
Start: 2020-01-01 | End: 2020-01-01

## 2020-01-01 RX ORDER — OLANZAPINE 15 MG/1
2.5 TABLET, FILM COATED ORAL ONCE
Refills: 0 | Status: COMPLETED | OUTPATIENT
Start: 2020-01-01 | End: 2020-01-01

## 2020-01-01 RX ORDER — ACETAMINOPHEN 500 MG
2 TABLET ORAL
Qty: 0 | Refills: 0 | DISCHARGE

## 2020-01-01 RX ORDER — QUETIAPINE FUMARATE 200 MG/1
200 TABLET, FILM COATED ORAL AT BEDTIME
Refills: 0 | Status: DISCONTINUED | OUTPATIENT
Start: 2020-01-01 | End: 2020-01-01

## 2020-01-01 RX ORDER — ATORVASTATIN CALCIUM 80 MG/1
1 TABLET, FILM COATED ORAL
Qty: 0 | Refills: 0 | DISCHARGE

## 2020-01-01 RX ORDER — SENNA PLUS 8.6 MG/1
2 TABLET ORAL AT BEDTIME
Refills: 0 | Status: DISCONTINUED | OUTPATIENT
Start: 2020-01-01 | End: 2020-01-01

## 2020-01-01 RX ORDER — INFLUENZA VIRUS VACCINE 15; 15; 15; 15 UG/.5ML; UG/.5ML; UG/.5ML; UG/.5ML
0.5 SUSPENSION INTRAMUSCULAR ONCE
Refills: 0 | Status: DISCONTINUED | OUTPATIENT
Start: 2020-01-01 | End: 2020-01-01

## 2020-01-01 RX ORDER — BISOPROLOL FUMARATE 10 MG/1
5 TABLET, FILM COATED ORAL DAILY
Refills: 0 | Status: DISCONTINUED | OUTPATIENT
Start: 2020-01-01 | End: 2020-01-01

## 2020-01-01 RX ORDER — QUETIAPINE FUMARATE 200 MG/1
0 TABLET, FILM COATED ORAL
Qty: 0 | Refills: 0 | DISCHARGE

## 2020-01-01 RX ORDER — DONEPEZIL HYDROCHLORIDE 10 MG/1
10 TABLET, FILM COATED ORAL AT BEDTIME
Refills: 0 | Status: DISCONTINUED | OUTPATIENT
Start: 2020-01-01 | End: 2020-01-01

## 2020-01-01 RX ORDER — QUETIAPINE FUMARATE 200 MG/1
50 TABLET, FILM COATED ORAL THREE TIMES A DAY
Refills: 0 | Status: DISCONTINUED | OUTPATIENT
Start: 2020-01-01 | End: 2020-01-01

## 2020-01-01 RX ORDER — INFLUENZA VIRUS VACCINE 15; 15; 15; 15 UG/.5ML; UG/.5ML; UG/.5ML; UG/.5ML
0.5 SUSPENSION INTRAMUSCULAR ONCE
Refills: 0 | Status: COMPLETED | OUTPATIENT
Start: 2020-01-01 | End: 2020-01-01

## 2020-01-01 RX ORDER — BISOPROLOL FUMARATE 10 MG/1
2.5 TABLET, FILM COATED ORAL
Refills: 0 | Status: DISCONTINUED | OUTPATIENT
Start: 2020-01-01 | End: 2020-01-01

## 2020-01-01 RX ORDER — PREGABALIN 225 MG/1
1 CAPSULE ORAL
Qty: 0 | Refills: 0 | DISCHARGE

## 2020-01-01 RX ORDER — TETANUS TOXOID, REDUCED DIPHTHERIA TOXOID AND ACELLULAR PERTUSSIS VACCINE, ADSORBED 5; 2.5; 8; 8; 2.5 [IU]/.5ML; [IU]/.5ML; UG/.5ML; UG/.5ML; UG/.5ML
0.5 SUSPENSION INTRAMUSCULAR ONCE
Refills: 0 | Status: COMPLETED | OUTPATIENT
Start: 2020-01-01 | End: 2020-01-01

## 2020-01-01 RX ORDER — CHOLECALCIFEROL (VITAMIN D3) 125 MCG
1000 CAPSULE ORAL DAILY
Refills: 0 | Status: DISCONTINUED | OUTPATIENT
Start: 2020-01-01 | End: 2020-01-01

## 2020-01-01 RX ORDER — ACETAMINOPHEN 500 MG
975 TABLET ORAL EVERY 6 HOURS
Refills: 0 | Status: DISCONTINUED | OUTPATIENT
Start: 2020-01-01 | End: 2020-01-01

## 2020-01-01 RX ORDER — WARFARIN SODIUM 2.5 MG/1
1 TABLET ORAL ONCE
Refills: 0 | Status: COMPLETED | OUTPATIENT
Start: 2020-01-01 | End: 2020-01-01

## 2020-01-01 RX ORDER — CEFTRIAXONE 500 MG/1
1000 INJECTION, POWDER, FOR SOLUTION INTRAMUSCULAR; INTRAVENOUS EVERY 24 HOURS
Refills: 0 | Status: DISCONTINUED | OUTPATIENT
Start: 2020-01-01 | End: 2020-01-01

## 2020-01-01 RX ORDER — WARFARIN SODIUM 2.5 MG/1
1 TABLET ORAL
Qty: 14 | Refills: 0

## 2020-01-01 RX ORDER — QUETIAPINE FUMARATE 200 MG/1
1 TABLET, FILM COATED ORAL
Qty: 0 | Refills: 0 | DISCHARGE

## 2020-01-01 RX ORDER — MEMANTINE HYDROCHLORIDE 10 MG/1
10 TABLET ORAL
Refills: 0 | Status: DISCONTINUED | OUTPATIENT
Start: 2020-01-01 | End: 2020-01-01

## 2020-01-01 RX ORDER — WARFARIN SODIUM 2.5 MG/1
1 TABLET ORAL
Qty: 0 | Refills: 0 | DISCHARGE

## 2020-01-01 RX ORDER — CHOLECALCIFEROL (VITAMIN D3) 125 MCG
1 CAPSULE ORAL
Qty: 0 | Refills: 0 | DISCHARGE

## 2020-01-01 RX ORDER — DONEPEZIL HYDROCHLORIDE 10 MG/1
1 TABLET, FILM COATED ORAL
Qty: 0 | Refills: 0 | DISCHARGE

## 2020-01-01 RX ORDER — HALOPERIDOL DECANOATE 100 MG/ML
1 INJECTION INTRAMUSCULAR ONCE
Refills: 0 | Status: DISCONTINUED | OUTPATIENT
Start: 2020-01-01 | End: 2020-01-01

## 2020-01-01 RX ORDER — PREGABALIN 225 MG/1
1000 CAPSULE ORAL DAILY
Refills: 0 | Status: DISCONTINUED | OUTPATIENT
Start: 2020-01-01 | End: 2020-01-01

## 2020-01-01 RX ORDER — SODIUM CHLORIDE 9 MG/ML
500 INJECTION INTRAMUSCULAR; INTRAVENOUS; SUBCUTANEOUS ONCE
Refills: 0 | Status: COMPLETED | OUTPATIENT
Start: 2020-01-01 | End: 2020-01-01

## 2020-01-01 RX ORDER — ONDANSETRON 8 MG/1
1 TABLET, FILM COATED ORAL
Qty: 10 | Refills: 0
Start: 2020-01-01 | End: 2020-01-01

## 2020-01-01 RX ORDER — SODIUM CHLORIDE 9 MG/ML
500 INJECTION INTRAMUSCULAR; INTRAVENOUS; SUBCUTANEOUS
Refills: 0 | Status: COMPLETED | OUTPATIENT
Start: 2020-01-01 | End: 2020-01-01

## 2020-01-01 RX ORDER — LANOLIN ALCOHOL/MO/W.PET/CERES
3 CREAM (GRAM) TOPICAL ONCE
Refills: 0 | Status: COMPLETED | OUTPATIENT
Start: 2020-01-01 | End: 2020-01-01

## 2020-01-01 RX ORDER — BISOPROLOL FUMARATE 10 MG/1
0.5 TABLET, FILM COATED ORAL
Qty: 0 | Refills: 0 | DISCHARGE

## 2020-01-01 RX ORDER — MEMANTINE HYDROCHLORIDE 10 MG/1
1 TABLET ORAL
Qty: 0 | Refills: 0 | DISCHARGE

## 2020-01-01 RX ORDER — LIDOCAINE 4 G/100G
1 CREAM TOPICAL ONCE
Refills: 0 | Status: COMPLETED | OUTPATIENT
Start: 2020-01-01 | End: 2020-01-01

## 2020-01-01 RX ORDER — WARFARIN SODIUM 2.5 MG/1
2 TABLET ORAL
Qty: 0 | Refills: 0 | DISCHARGE

## 2020-01-01 RX ORDER — OLANZAPINE 15 MG/1
2.5 TABLET, FILM COATED ORAL ONCE
Refills: 0 | Status: DISCONTINUED | OUTPATIENT
Start: 2020-01-01 | End: 2020-01-01

## 2020-01-01 RX ORDER — WARFARIN SODIUM 2.5 MG/1
4 TABLET ORAL ONCE
Refills: 0 | Status: DISCONTINUED | OUTPATIENT
Start: 2020-01-01 | End: 2020-01-01

## 2020-01-01 RX ORDER — CEFTRIAXONE 500 MG/1
1000 INJECTION, POWDER, FOR SOLUTION INTRAMUSCULAR; INTRAVENOUS ONCE
Refills: 0 | Status: COMPLETED | OUTPATIENT
Start: 2020-01-01 | End: 2020-01-01

## 2020-01-01 RX ADMIN — LIDOCAINE 1 PATCH: 4 CREAM TOPICAL at 07:31

## 2020-01-01 RX ADMIN — Medication 1 TABLET(S): at 11:43

## 2020-01-01 RX ADMIN — ATORVASTATIN CALCIUM 20 MILLIGRAM(S): 80 TABLET, FILM COATED ORAL at 21:04

## 2020-01-01 RX ADMIN — OLANZAPINE 2.5 MILLIGRAM(S): 15 TABLET, FILM COATED ORAL at 12:57

## 2020-01-01 RX ADMIN — PANTOPRAZOLE SODIUM 40 MILLIGRAM(S): 20 TABLET, DELAYED RELEASE ORAL at 06:17

## 2020-01-01 RX ADMIN — PREGABALIN 1000 MICROGRAM(S): 225 CAPSULE ORAL at 12:23

## 2020-01-01 RX ADMIN — BISOPROLOL FUMARATE 5 MILLIGRAM(S): 10 TABLET, FILM COATED ORAL at 05:56

## 2020-01-01 RX ADMIN — Medication 975 MILLIGRAM(S): at 06:07

## 2020-01-01 RX ADMIN — Medication 0.5 MILLIGRAM(S): at 19:43

## 2020-01-01 RX ADMIN — Medication 1000 UNIT(S): at 11:43

## 2020-01-01 RX ADMIN — AMLODIPINE BESYLATE 5 MILLIGRAM(S): 2.5 TABLET ORAL at 06:35

## 2020-01-01 RX ADMIN — QUETIAPINE FUMARATE 50 MILLIGRAM(S): 200 TABLET, FILM COATED ORAL at 21:14

## 2020-01-01 RX ADMIN — Medication 1000 UNIT(S): at 12:36

## 2020-01-01 RX ADMIN — CEFTRIAXONE 100 MILLIGRAM(S): 500 INJECTION, POWDER, FOR SOLUTION INTRAMUSCULAR; INTRAVENOUS at 00:14

## 2020-01-01 RX ADMIN — MEMANTINE HYDROCHLORIDE 10 MILLIGRAM(S): 10 TABLET ORAL at 17:42

## 2020-01-01 RX ADMIN — MEMANTINE HYDROCHLORIDE 10 MILLIGRAM(S): 10 TABLET ORAL at 17:52

## 2020-01-01 RX ADMIN — AMLODIPINE BESYLATE 5 MILLIGRAM(S): 2.5 TABLET ORAL at 05:01

## 2020-01-01 RX ADMIN — CEFTRIAXONE 100 MILLIGRAM(S): 500 INJECTION, POWDER, FOR SOLUTION INTRAMUSCULAR; INTRAVENOUS at 00:49

## 2020-01-01 RX ADMIN — WARFARIN SODIUM 2 MILLIGRAM(S): 2.5 TABLET ORAL at 21:04

## 2020-01-01 RX ADMIN — BISOPROLOL FUMARATE 2.5 MILLIGRAM(S): 10 TABLET, FILM COATED ORAL at 21:16

## 2020-01-01 RX ADMIN — TETANUS TOXOID, REDUCED DIPHTHERIA TOXOID AND ACELLULAR PERTUSSIS VACCINE, ADSORBED 0.5 MILLILITER(S): 5; 2.5; 8; 8; 2.5 SUSPENSION INTRAMUSCULAR at 21:26

## 2020-01-01 RX ADMIN — QUETIAPINE FUMARATE 50 MILLIGRAM(S): 200 TABLET, FILM COATED ORAL at 13:16

## 2020-01-01 RX ADMIN — PANTOPRAZOLE SODIUM 40 MILLIGRAM(S): 20 TABLET, DELAYED RELEASE ORAL at 06:03

## 2020-01-01 RX ADMIN — AMLODIPINE BESYLATE 5 MILLIGRAM(S): 2.5 TABLET ORAL at 05:45

## 2020-01-01 RX ADMIN — MEMANTINE HYDROCHLORIDE 10 MILLIGRAM(S): 10 TABLET ORAL at 07:26

## 2020-01-01 RX ADMIN — QUETIAPINE FUMARATE 50 MILLIGRAM(S): 200 TABLET, FILM COATED ORAL at 21:35

## 2020-01-01 RX ADMIN — WARFARIN SODIUM 2 MILLIGRAM(S): 2.5 TABLET ORAL at 21:17

## 2020-01-01 RX ADMIN — DONEPEZIL HYDROCHLORIDE 10 MILLIGRAM(S): 10 TABLET, FILM COATED ORAL at 21:02

## 2020-01-01 RX ADMIN — ATORVASTATIN CALCIUM 20 MILLIGRAM(S): 80 TABLET, FILM COATED ORAL at 22:31

## 2020-01-01 RX ADMIN — Medication 1 TABLET(S): at 12:36

## 2020-01-01 RX ADMIN — QUETIAPINE FUMARATE 50 MILLIGRAM(S): 200 TABLET, FILM COATED ORAL at 21:42

## 2020-01-01 RX ADMIN — BISOPROLOL FUMARATE 2.5 MILLIGRAM(S): 10 TABLET, FILM COATED ORAL at 21:02

## 2020-01-01 RX ADMIN — DONEPEZIL HYDROCHLORIDE 10 MILLIGRAM(S): 10 TABLET, FILM COATED ORAL at 21:50

## 2020-01-01 RX ADMIN — BISOPROLOL FUMARATE 5 MILLIGRAM(S): 10 TABLET, FILM COATED ORAL at 06:01

## 2020-01-01 RX ADMIN — WARFARIN SODIUM 4 MILLIGRAM(S): 2.5 TABLET ORAL at 21:42

## 2020-01-01 RX ADMIN — BISOPROLOL FUMARATE 2.5 MILLIGRAM(S): 10 TABLET, FILM COATED ORAL at 09:16

## 2020-01-01 RX ADMIN — QUETIAPINE FUMARATE 200 MILLIGRAM(S): 200 TABLET, FILM COATED ORAL at 22:31

## 2020-01-01 RX ADMIN — DONEPEZIL HYDROCHLORIDE 10 MILLIGRAM(S): 10 TABLET, FILM COATED ORAL at 21:17

## 2020-01-01 RX ADMIN — BISOPROLOL FUMARATE 2.5 MILLIGRAM(S): 10 TABLET, FILM COATED ORAL at 22:28

## 2020-01-01 RX ADMIN — LIDOCAINE 1 PATCH: 4 CREAM TOPICAL at 21:28

## 2020-01-01 RX ADMIN — BISOPROLOL FUMARATE 2.5 MILLIGRAM(S): 10 TABLET, FILM COATED ORAL at 08:57

## 2020-01-01 RX ADMIN — BISOPROLOL FUMARATE 2.5 MILLIGRAM(S): 10 TABLET, FILM COATED ORAL at 07:26

## 2020-01-01 RX ADMIN — MEMANTINE HYDROCHLORIDE 10 MILLIGRAM(S): 10 TABLET ORAL at 21:49

## 2020-01-01 RX ADMIN — MEMANTINE HYDROCHLORIDE 10 MILLIGRAM(S): 10 TABLET ORAL at 06:01

## 2020-01-01 RX ADMIN — PREGABALIN 1000 MICROGRAM(S): 225 CAPSULE ORAL at 13:11

## 2020-01-01 RX ADMIN — BISOPROLOL FUMARATE 5 MILLIGRAM(S): 10 TABLET, FILM COATED ORAL at 06:37

## 2020-01-01 RX ADMIN — Medication 1 TABLET(S): at 12:23

## 2020-01-01 RX ADMIN — Medication 1000 UNIT(S): at 13:11

## 2020-01-01 RX ADMIN — MEMANTINE HYDROCHLORIDE 10 MILLIGRAM(S): 10 TABLET ORAL at 05:01

## 2020-01-01 RX ADMIN — QUETIAPINE FUMARATE 50 MILLIGRAM(S): 200 TABLET, FILM COATED ORAL at 13:22

## 2020-01-01 RX ADMIN — PANTOPRAZOLE SODIUM 40 MILLIGRAM(S): 20 TABLET, DELAYED RELEASE ORAL at 06:06

## 2020-01-01 RX ADMIN — PANTOPRAZOLE SODIUM 40 MILLIGRAM(S): 20 TABLET, DELAYED RELEASE ORAL at 08:28

## 2020-01-01 RX ADMIN — WARFARIN SODIUM 2 MILLIGRAM(S): 2.5 TABLET ORAL at 21:50

## 2020-01-01 RX ADMIN — QUETIAPINE FUMARATE 200 MILLIGRAM(S): 200 TABLET, FILM COATED ORAL at 21:02

## 2020-01-01 RX ADMIN — Medication 975 MILLIGRAM(S): at 06:03

## 2020-01-01 RX ADMIN — MEMANTINE HYDROCHLORIDE 10 MILLIGRAM(S): 10 TABLET ORAL at 06:35

## 2020-01-01 RX ADMIN — Medication 1000 UNIT(S): at 11:04

## 2020-01-01 RX ADMIN — MEMANTINE HYDROCHLORIDE 10 MILLIGRAM(S): 10 TABLET ORAL at 06:03

## 2020-01-01 RX ADMIN — MEMANTINE HYDROCHLORIDE 10 MILLIGRAM(S): 10 TABLET ORAL at 05:56

## 2020-01-01 RX ADMIN — QUETIAPINE FUMARATE 50 MILLIGRAM(S): 200 TABLET, FILM COATED ORAL at 05:44

## 2020-01-01 RX ADMIN — PREGABALIN 1000 MICROGRAM(S): 225 CAPSULE ORAL at 12:36

## 2020-01-01 RX ADMIN — DONEPEZIL HYDROCHLORIDE 10 MILLIGRAM(S): 10 TABLET, FILM COATED ORAL at 21:35

## 2020-01-01 RX ADMIN — Medication 650 MILLIGRAM(S): at 21:26

## 2020-01-01 RX ADMIN — PANTOPRAZOLE SODIUM 40 MILLIGRAM(S): 20 TABLET, DELAYED RELEASE ORAL at 05:57

## 2020-01-01 RX ADMIN — WARFARIN SODIUM 2 MILLIGRAM(S): 2.5 TABLET ORAL at 21:03

## 2020-01-01 RX ADMIN — LIDOCAINE 1 PATCH: 4 CREAM TOPICAL at 09:54

## 2020-01-01 RX ADMIN — SODIUM CHLORIDE 125 MILLILITER(S): 9 INJECTION INTRAMUSCULAR; INTRAVENOUS; SUBCUTANEOUS at 05:29

## 2020-01-01 RX ADMIN — Medication 3 MILLIGRAM(S): at 01:38

## 2020-01-01 RX ADMIN — QUETIAPINE FUMARATE 50 MILLIGRAM(S): 200 TABLET, FILM COATED ORAL at 13:11

## 2020-01-01 RX ADMIN — Medication 1 TABLET(S): at 11:04

## 2020-01-01 RX ADMIN — DONEPEZIL HYDROCHLORIDE 10 MILLIGRAM(S): 10 TABLET, FILM COATED ORAL at 21:04

## 2020-01-01 RX ADMIN — PREGABALIN 1000 MICROGRAM(S): 225 CAPSULE ORAL at 11:04

## 2020-01-01 RX ADMIN — PREGABALIN 1000 MICROGRAM(S): 225 CAPSULE ORAL at 11:43

## 2020-01-01 RX ADMIN — QUETIAPINE FUMARATE 50 MILLIGRAM(S): 200 TABLET, FILM COATED ORAL at 15:27

## 2020-01-01 RX ADMIN — MEMANTINE HYDROCHLORIDE 10 MILLIGRAM(S): 10 TABLET ORAL at 18:07

## 2020-01-01 RX ADMIN — QUETIAPINE FUMARATE 50 MILLIGRAM(S): 200 TABLET, FILM COATED ORAL at 13:31

## 2020-01-01 RX ADMIN — AMLODIPINE BESYLATE 5 MILLIGRAM(S): 2.5 TABLET ORAL at 06:01

## 2020-01-01 RX ADMIN — AMLODIPINE BESYLATE 5 MILLIGRAM(S): 2.5 TABLET ORAL at 05:53

## 2020-01-01 RX ADMIN — PANTOPRAZOLE SODIUM 40 MILLIGRAM(S): 20 TABLET, DELAYED RELEASE ORAL at 05:45

## 2020-01-01 RX ADMIN — AMLODIPINE BESYLATE 5 MILLIGRAM(S): 2.5 TABLET ORAL at 06:17

## 2020-01-01 RX ADMIN — QUETIAPINE FUMARATE 50 MILLIGRAM(S): 200 TABLET, FILM COATED ORAL at 06:17

## 2020-01-01 RX ADMIN — MEMANTINE HYDROCHLORIDE 10 MILLIGRAM(S): 10 TABLET ORAL at 17:30

## 2020-01-01 RX ADMIN — MEMANTINE HYDROCHLORIDE 10 MILLIGRAM(S): 10 TABLET ORAL at 17:21

## 2020-01-01 RX ADMIN — Medication 1000 UNIT(S): at 12:23

## 2020-01-01 RX ADMIN — AMLODIPINE BESYLATE 5 MILLIGRAM(S): 2.5 TABLET ORAL at 05:56

## 2020-01-01 RX ADMIN — DONEPEZIL HYDROCHLORIDE 10 MILLIGRAM(S): 10 TABLET, FILM COATED ORAL at 21:14

## 2020-01-01 RX ADMIN — MEMANTINE HYDROCHLORIDE 10 MILLIGRAM(S): 10 TABLET ORAL at 18:10

## 2020-01-01 RX ADMIN — QUETIAPINE FUMARATE 50 MILLIGRAM(S): 200 TABLET, FILM COATED ORAL at 06:01

## 2020-01-01 RX ADMIN — Medication 1 TABLET(S): at 13:11

## 2020-01-01 RX ADMIN — QUETIAPINE FUMARATE 50 MILLIGRAM(S): 200 TABLET, FILM COATED ORAL at 05:57

## 2020-01-01 RX ADMIN — OLANZAPINE 2.5 MILLIGRAM(S): 15 TABLET, FILM COATED ORAL at 05:16

## 2020-01-01 RX ADMIN — WARFARIN SODIUM 3 MILLIGRAM(S): 2.5 TABLET ORAL at 21:36

## 2020-01-01 RX ADMIN — QUETIAPINE FUMARATE 50 MILLIGRAM(S): 200 TABLET, FILM COATED ORAL at 06:03

## 2020-01-01 RX ADMIN — MEMANTINE HYDROCHLORIDE 10 MILLIGRAM(S): 10 TABLET ORAL at 05:53

## 2020-01-01 RX ADMIN — MEMANTINE HYDROCHLORIDE 10 MILLIGRAM(S): 10 TABLET ORAL at 05:45

## 2020-01-01 RX ADMIN — WARFARIN SODIUM 2 MILLIGRAM(S): 2.5 TABLET ORAL at 22:31

## 2020-01-01 RX ADMIN — QUETIAPINE FUMARATE 50 MILLIGRAM(S): 200 TABLET, FILM COATED ORAL at 06:35

## 2020-01-01 RX ADMIN — MEMANTINE HYDROCHLORIDE 10 MILLIGRAM(S): 10 TABLET ORAL at 06:17

## 2020-01-01 RX ADMIN — QUETIAPINE FUMARATE 50 MILLIGRAM(S): 200 TABLET, FILM COATED ORAL at 21:50

## 2020-01-01 RX ADMIN — OLANZAPINE 2.5 MILLIGRAM(S): 15 TABLET, FILM COATED ORAL at 10:51

## 2020-01-01 RX ADMIN — CEFTRIAXONE 100 MILLIGRAM(S): 500 INJECTION, POWDER, FOR SOLUTION INTRAMUSCULAR; INTRAVENOUS at 01:00

## 2020-01-01 RX ADMIN — MEMANTINE HYDROCHLORIDE 10 MILLIGRAM(S): 10 TABLET ORAL at 17:48

## 2020-01-01 RX ADMIN — AMLODIPINE BESYLATE 5 MILLIGRAM(S): 2.5 TABLET ORAL at 06:03

## 2020-01-01 RX ADMIN — BISOPROLOL FUMARATE 5 MILLIGRAM(S): 10 TABLET, FILM COATED ORAL at 05:44

## 2020-01-01 RX ADMIN — DONEPEZIL HYDROCHLORIDE 10 MILLIGRAM(S): 10 TABLET, FILM COATED ORAL at 22:31

## 2020-01-01 RX ADMIN — ATORVASTATIN CALCIUM 20 MILLIGRAM(S): 80 TABLET, FILM COATED ORAL at 21:03

## 2020-01-01 RX ADMIN — QUETIAPINE FUMARATE 50 MILLIGRAM(S): 200 TABLET, FILM COATED ORAL at 21:04

## 2020-01-01 RX ADMIN — DONEPEZIL HYDROCHLORIDE 10 MILLIGRAM(S): 10 TABLET, FILM COATED ORAL at 21:42

## 2020-01-01 RX ADMIN — BISOPROLOL FUMARATE 2.5 MILLIGRAM(S): 10 TABLET, FILM COATED ORAL at 21:55

## 2020-01-01 RX ADMIN — QUETIAPINE FUMARATE 200 MILLIGRAM(S): 200 TABLET, FILM COATED ORAL at 21:16

## 2020-01-01 RX ADMIN — ATORVASTATIN CALCIUM 20 MILLIGRAM(S): 80 TABLET, FILM COATED ORAL at 21:17

## 2020-01-01 RX ADMIN — BISOPROLOL FUMARATE 2.5 MILLIGRAM(S): 10 TABLET, FILM COATED ORAL at 09:34

## 2020-01-01 RX ADMIN — SODIUM CHLORIDE 500 MILLILITER(S): 9 INJECTION INTRAMUSCULAR; INTRAVENOUS; SUBCUTANEOUS at 21:26

## 2020-01-01 RX ADMIN — PANTOPRAZOLE SODIUM 40 MILLIGRAM(S): 20 TABLET, DELAYED RELEASE ORAL at 06:05

## 2020-01-01 RX ADMIN — BISOPROLOL FUMARATE 5 MILLIGRAM(S): 10 TABLET, FILM COATED ORAL at 06:17

## 2020-01-01 RX ADMIN — WARFARIN SODIUM 1 MILLIGRAM(S): 2.5 TABLET ORAL at 21:14

## 2020-01-01 RX ADMIN — ATORVASTATIN CALCIUM 20 MILLIGRAM(S): 80 TABLET, FILM COATED ORAL at 21:50

## 2020-01-03 ENCOUNTER — RX RENEWAL (OUTPATIENT)
Age: 85
End: 2020-01-03

## 2020-01-03 LAB — INR PPP: 2.4

## 2020-01-15 ENCOUNTER — RX RENEWAL (OUTPATIENT)
Age: 85
End: 2020-01-15

## 2020-01-15 RX ORDER — WARFARIN 1 MG/1
1 TABLET ORAL DAILY
Qty: 90 | Refills: 3 | Status: ACTIVE | COMMUNITY
Start: 2020-01-15 | End: 1900-01-01

## 2020-01-24 ENCOUNTER — TRANSCRIPTION ENCOUNTER (OUTPATIENT)
Age: 85
End: 2020-01-24

## 2020-01-24 ENCOUNTER — INPATIENT (INPATIENT)
Facility: HOSPITAL | Age: 85
LOS: 4 days | Discharge: NURSING, REHAB OR LONG-TERM CARE | DRG: 470 | End: 2020-01-29
Attending: ORTHOPAEDIC SURGERY | Admitting: ORTHOPAEDIC SURGERY
Payer: MEDICARE

## 2020-01-24 VITALS
OXYGEN SATURATION: 94 % | TEMPERATURE: 98 F | HEART RATE: 85 BPM | RESPIRATION RATE: 16 BRPM | SYSTOLIC BLOOD PRESSURE: 95 MMHG | DIASTOLIC BLOOD PRESSURE: 59 MMHG

## 2020-01-24 LAB
ALBUMIN SERPL ELPH-MCNC: 3.9 G/DL — SIGNIFICANT CHANGE UP (ref 3.3–5)
ALP SERPL-CCNC: 135 U/L — HIGH (ref 40–120)
ALT FLD-CCNC: 25 U/L — SIGNIFICANT CHANGE UP (ref 10–45)
ANION GAP SERPL CALC-SCNC: 12 MMOL/L — SIGNIFICANT CHANGE UP (ref 5–17)
APTT BLD: 33.2 SEC — SIGNIFICANT CHANGE UP (ref 27.5–36.3)
AST SERPL-CCNC: 35 U/L — SIGNIFICANT CHANGE UP (ref 10–40)
BASOPHILS # BLD AUTO: 0.13 K/UL — SIGNIFICANT CHANGE UP (ref 0–0.2)
BASOPHILS NFR BLD AUTO: 1 % — SIGNIFICANT CHANGE UP (ref 0–2)
BILIRUB SERPL-MCNC: 0.4 MG/DL — SIGNIFICANT CHANGE UP (ref 0.2–1.2)
BLD GP AB SCN SERPL QL: NEGATIVE — SIGNIFICANT CHANGE UP
BUN SERPL-MCNC: 18 MG/DL — SIGNIFICANT CHANGE UP (ref 7–23)
CALCIUM SERPL-MCNC: 9.9 MG/DL — SIGNIFICANT CHANGE UP (ref 8.4–10.5)
CHLORIDE SERPL-SCNC: 100 MMOL/L — SIGNIFICANT CHANGE UP (ref 96–108)
CO2 SERPL-SCNC: 23 MMOL/L — SIGNIFICANT CHANGE UP (ref 22–31)
CREAT SERPL-MCNC: 0.88 MG/DL — SIGNIFICANT CHANGE UP (ref 0.5–1.3)
EOSINOPHIL # BLD AUTO: 0.06 K/UL — SIGNIFICANT CHANGE UP (ref 0–0.5)
EOSINOPHIL NFR BLD AUTO: 0.5 % — SIGNIFICANT CHANGE UP (ref 0–6)
GLUCOSE SERPL-MCNC: 204 MG/DL — HIGH (ref 70–99)
HCT VFR BLD CALC: 42.3 % — SIGNIFICANT CHANGE UP (ref 34.5–45)
HGB BLD-MCNC: 13.7 G/DL — SIGNIFICANT CHANGE UP (ref 11.5–15.5)
IMM GRANULOCYTES NFR BLD AUTO: 0.5 % — SIGNIFICANT CHANGE UP (ref 0–1.5)
INR BLD: 1.91 RATIO — HIGH (ref 0.88–1.16)
INR PPP: 2
LYMPHOCYTES # BLD AUTO: 1.49 K/UL — SIGNIFICANT CHANGE UP (ref 1–3.3)
LYMPHOCYTES # BLD AUTO: 11.3 % — LOW (ref 13–44)
MCHC RBC-ENTMCNC: 29.1 PG — SIGNIFICANT CHANGE UP (ref 27–34)
MCHC RBC-ENTMCNC: 32.4 GM/DL — SIGNIFICANT CHANGE UP (ref 32–36)
MCV RBC AUTO: 90 FL — SIGNIFICANT CHANGE UP (ref 80–100)
MONOCYTES # BLD AUTO: 0.65 K/UL — SIGNIFICANT CHANGE UP (ref 0–0.9)
MONOCYTES NFR BLD AUTO: 4.9 % — SIGNIFICANT CHANGE UP (ref 2–14)
NEUTROPHILS # BLD AUTO: 10.83 K/UL — HIGH (ref 1.8–7.4)
NEUTROPHILS NFR BLD AUTO: 81.8 % — HIGH (ref 43–77)
NRBC # BLD: 0 /100 WBCS — SIGNIFICANT CHANGE UP (ref 0–0)
PLATELET # BLD AUTO: 216 K/UL — SIGNIFICANT CHANGE UP (ref 150–400)
POTASSIUM SERPL-MCNC: 5 MMOL/L — SIGNIFICANT CHANGE UP (ref 3.5–5.3)
POTASSIUM SERPL-SCNC: 5 MMOL/L — SIGNIFICANT CHANGE UP (ref 3.5–5.3)
PROT SERPL-MCNC: 7.1 G/DL — SIGNIFICANT CHANGE UP (ref 6–8.3)
PROTHROM AB SERPL-ACNC: 22.4 SEC — HIGH (ref 10–12.9)
RBC # BLD: 4.7 M/UL — SIGNIFICANT CHANGE UP (ref 3.8–5.2)
RBC # FLD: 12.5 % — SIGNIFICANT CHANGE UP (ref 10.3–14.5)
RH IG SCN BLD-IMP: NEGATIVE — SIGNIFICANT CHANGE UP
SODIUM SERPL-SCNC: 135 MMOL/L — SIGNIFICANT CHANGE UP (ref 135–145)
WBC # BLD: 13.23 K/UL — HIGH (ref 3.8–10.5)
WBC # FLD AUTO: 13.23 K/UL — HIGH (ref 3.8–10.5)

## 2020-01-24 PROCEDURE — 71045 X-RAY EXAM CHEST 1 VIEW: CPT | Mod: 26

## 2020-01-24 PROCEDURE — 93010 ELECTROCARDIOGRAM REPORT: CPT

## 2020-01-24 PROCEDURE — 70450 CT HEAD/BRAIN W/O DYE: CPT | Mod: 26

## 2020-01-24 PROCEDURE — 72192 CT PELVIS W/O DYE: CPT | Mod: 26

## 2020-01-24 PROCEDURE — 99285 EMERGENCY DEPT VISIT HI MDM: CPT

## 2020-01-24 PROCEDURE — 73562 X-RAY EXAM OF KNEE 3: CPT | Mod: 26,LT

## 2020-01-24 PROCEDURE — 73502 X-RAY EXAM HIP UNI 2-3 VIEWS: CPT | Mod: 26,LT

## 2020-01-24 PROCEDURE — 73552 X-RAY EXAM OF FEMUR 2/>: CPT | Mod: 26,LT

## 2020-01-24 PROCEDURE — 76377 3D RENDER W/INTRP POSTPROCES: CPT | Mod: 26

## 2020-01-24 RX ORDER — ACETAMINOPHEN 500 MG
975 TABLET ORAL ONCE
Refills: 0 | Status: COMPLETED | OUTPATIENT
Start: 2020-01-24 | End: 2020-01-24

## 2020-01-24 RX ORDER — MORPHINE SULFATE 50 MG/1
2 CAPSULE, EXTENDED RELEASE ORAL ONCE
Refills: 0 | Status: DISCONTINUED | OUTPATIENT
Start: 2020-01-24 | End: 2020-01-24

## 2020-01-24 RX ADMIN — Medication 975 MILLIGRAM(S): at 22:37

## 2020-01-24 RX ADMIN — Medication 975 MILLIGRAM(S): at 19:58

## 2020-01-24 RX ADMIN — MORPHINE SULFATE 2 MILLIGRAM(S): 50 CAPSULE, EXTENDED RELEASE ORAL at 22:37

## 2020-01-24 NOTE — ED PROVIDER NOTE - ATTENDING CONTRIBUTION TO CARE
84y F hx Afib on Coumadin BIBEMS from the [facility] after having mechanical fall in dining room. No head strike or LOC reported by facility. Pt c/o L hip and thigh pain with inability to ambulate. Awake alert and at neuro baseline, Neck supple no midline spinal TTP, abd soft ntnd, no chest wall TTP, L hip TTP, unable to flex at L hip or L knee, Palp DP BL, L ext shortened and ext rotated. Concern for L hip fx. Labs, CTH as on AC, Xrays of pelvis/hip, L femur. Pain control.

## 2020-01-24 NOTE — ED PROVIDER NOTE - PHYSICAL EXAMINATION
GEN: Pt in NAD, A&O x2 (to person and place, does not know year or president- daughter at bedside confirms this is patient's baseline). GCS 15.   EYES: No periorbital ecchymosis, sclera white w/o injection PERRLA, EOMI w/o pain.  HENT: Head NCAT. Nares without deformity, no DC. No auricular tenderness, no ear DC. no Lizarraga's sign. No dental trauma. Neck supple FROM. No midline or paraspinal tenderness. Trachea midline.   RESP: No retractions or chest wall deformities, no signs of trauma/bruising. No chest wall tenderness, CTA b/l, no wheezes, rales, or rhonchi.   CARDIAC: RRR clear distinct S1, S2, no murmurs.  ABDOMEN: Abdomen without any obvious deformities, no signs of trauma or bruising. Abdomen soft, non-tender. No CVAT b/l.   VASC: 2+ radial and dorsalis pulses b/l.   MSK: LLE shortened and externally rotated. No joint erythema or other obvious deformities. ttp to light palpation of L proximal thigh and lateral hip. No other bony tenderness. Spine without obvious deformity. From b/l UE and LE (L hip and knee not tested).  NEURO: CN2-12 intact. Normal and equal sensation UE, LE and face b/l. 5/5 strength upper and lower extremity b/l (L knee and hip not tested). Pronator drift negative. Normal gait and gross cerebellar functioning.   SKIN: No rashes noted.

## 2020-01-24 NOTE — ED PROVIDER NOTE - OBJECTIVE STATEMENT
83 y/o F PMHx of HTN, HLD, hypothyroidism, Afib on coumadin BIBEMS from the [facility] with daughter at bedside c/o fall. Pt states she was walking, tripped over her own feet and had mechanical fall, landed on L hip with pain and inability to ambulate after, denies head trauma or LOC. Pt denies preceding HA, dizziness, CP, or other sxs. Pt only c/o L hip pain in ED, denies back pain, neck pain, HA, CP, abd pain, other extremity pain. Pt would only like Tylenol for pain.

## 2020-01-24 NOTE — ED PROVIDER NOTE - CARE PLAN
Principal Discharge DX:	Closed fracture of left hip, initial encounter  Goal:	subcapital femoral neck

## 2020-01-25 ENCOUNTER — RESULT REVIEW (OUTPATIENT)
Age: 85
End: 2020-01-25

## 2020-01-25 DIAGNOSIS — I10 ESSENTIAL (PRIMARY) HYPERTENSION: ICD-10-CM

## 2020-01-25 DIAGNOSIS — S72.002A FRACTURE OF UNSPECIFIED PART OF NECK OF LEFT FEMUR, INITIAL ENCOUNTER FOR CLOSED FRACTURE: ICD-10-CM

## 2020-01-25 DIAGNOSIS — E78.00 PURE HYPERCHOLESTEROLEMIA, UNSPECIFIED: ICD-10-CM

## 2020-01-25 DIAGNOSIS — F03.90 UNSPECIFIED DEMENTIA WITHOUT BEHAVIORAL DISTURBANCE: ICD-10-CM

## 2020-01-25 DIAGNOSIS — I48.91 UNSPECIFIED ATRIAL FIBRILLATION: ICD-10-CM

## 2020-01-25 DIAGNOSIS — E03.9 HYPOTHYROIDISM, UNSPECIFIED: ICD-10-CM

## 2020-01-25 LAB
ANION GAP SERPL CALC-SCNC: 14 MMOL/L — SIGNIFICANT CHANGE UP (ref 5–17)
ANION GAP SERPL CALC-SCNC: 16 MMOL/L — SIGNIFICANT CHANGE UP (ref 5–17)
APTT BLD: 28.8 SEC — SIGNIFICANT CHANGE UP (ref 27.5–36.3)
APTT BLD: 29.2 SEC — SIGNIFICANT CHANGE UP (ref 27.5–36.3)
BASOPHILS # BLD AUTO: 0.09 K/UL — SIGNIFICANT CHANGE UP (ref 0–0.2)
BASOPHILS NFR BLD AUTO: 0.7 % — SIGNIFICANT CHANGE UP (ref 0–2)
BLD GP AB SCN SERPL QL: NEGATIVE — SIGNIFICANT CHANGE UP
BUN SERPL-MCNC: 12 MG/DL — SIGNIFICANT CHANGE UP (ref 7–23)
BUN SERPL-MCNC: 17 MG/DL — SIGNIFICANT CHANGE UP (ref 7–23)
CALCIUM SERPL-MCNC: 9 MG/DL — SIGNIFICANT CHANGE UP (ref 8.4–10.5)
CALCIUM SERPL-MCNC: 9.6 MG/DL — SIGNIFICANT CHANGE UP (ref 8.4–10.5)
CHLORIDE SERPL-SCNC: 96 MMOL/L — SIGNIFICANT CHANGE UP (ref 96–108)
CHLORIDE SERPL-SCNC: 98 MMOL/L — SIGNIFICANT CHANGE UP (ref 96–108)
CO2 SERPL-SCNC: 21 MMOL/L — LOW (ref 22–31)
CO2 SERPL-SCNC: 23 MMOL/L — SIGNIFICANT CHANGE UP (ref 22–31)
CREAT SERPL-MCNC: 0.67 MG/DL — SIGNIFICANT CHANGE UP (ref 0.5–1.3)
CREAT SERPL-MCNC: 0.82 MG/DL — SIGNIFICANT CHANGE UP (ref 0.5–1.3)
EOSINOPHIL # BLD AUTO: 0.02 K/UL — SIGNIFICANT CHANGE UP (ref 0–0.5)
EOSINOPHIL NFR BLD AUTO: 0.2 % — SIGNIFICANT CHANGE UP (ref 0–6)
GLUCOSE SERPL-MCNC: 200 MG/DL — HIGH (ref 70–99)
GLUCOSE SERPL-MCNC: 205 MG/DL — HIGH (ref 70–99)
HCT VFR BLD CALC: 42.4 % — SIGNIFICANT CHANGE UP (ref 34.5–45)
HCT VFR BLD CALC: 45.6 % — HIGH (ref 34.5–45)
HGB BLD-MCNC: 13.6 G/DL — SIGNIFICANT CHANGE UP (ref 11.5–15.5)
HGB BLD-MCNC: 14.3 G/DL — SIGNIFICANT CHANGE UP (ref 11.5–15.5)
IMM GRANULOCYTES NFR BLD AUTO: 0.4 % — SIGNIFICANT CHANGE UP (ref 0–1.5)
INR BLD: 1.54 RATIO — HIGH (ref 0.88–1.16)
INR BLD: 1.67 RATIO — HIGH (ref 0.88–1.16)
LYMPHOCYTES # BLD AUTO: 1.35 K/UL — SIGNIFICANT CHANGE UP (ref 1–3.3)
LYMPHOCYTES # BLD AUTO: 11.2 % — LOW (ref 13–44)
MCHC RBC-ENTMCNC: 28.6 PG — SIGNIFICANT CHANGE UP (ref 27–34)
MCHC RBC-ENTMCNC: 29.2 PG — SIGNIFICANT CHANGE UP (ref 27–34)
MCHC RBC-ENTMCNC: 31.4 GM/DL — LOW (ref 32–36)
MCHC RBC-ENTMCNC: 32.1 GM/DL — SIGNIFICANT CHANGE UP (ref 32–36)
MCV RBC AUTO: 91 FL — SIGNIFICANT CHANGE UP (ref 80–100)
MCV RBC AUTO: 91.2 FL — SIGNIFICANT CHANGE UP (ref 80–100)
MONOCYTES # BLD AUTO: 0.64 K/UL — SIGNIFICANT CHANGE UP (ref 0–0.9)
MONOCYTES NFR BLD AUTO: 5.3 % — SIGNIFICANT CHANGE UP (ref 2–14)
NEUTROPHILS # BLD AUTO: 9.87 K/UL — HIGH (ref 1.8–7.4)
NEUTROPHILS NFR BLD AUTO: 82.2 % — HIGH (ref 43–77)
NRBC # BLD: 0 /100 WBCS — SIGNIFICANT CHANGE UP (ref 0–0)
NRBC # BLD: 0 /100 WBCS — SIGNIFICANT CHANGE UP (ref 0–0)
PLATELET # BLD AUTO: 220 K/UL — SIGNIFICANT CHANGE UP (ref 150–400)
PLATELET # BLD AUTO: 232 K/UL — SIGNIFICANT CHANGE UP (ref 150–400)
POTASSIUM SERPL-MCNC: 4.2 MMOL/L — SIGNIFICANT CHANGE UP (ref 3.5–5.3)
POTASSIUM SERPL-MCNC: 4.7 MMOL/L — SIGNIFICANT CHANGE UP (ref 3.5–5.3)
POTASSIUM SERPL-SCNC: 4.2 MMOL/L — SIGNIFICANT CHANGE UP (ref 3.5–5.3)
POTASSIUM SERPL-SCNC: 4.7 MMOL/L — SIGNIFICANT CHANGE UP (ref 3.5–5.3)
PROTHROM AB SERPL-ACNC: 17.8 SEC — HIGH (ref 10–12.9)
PROTHROM AB SERPL-ACNC: 19.5 SEC — HIGH (ref 10–12.9)
RBC # BLD: 4.66 M/UL — SIGNIFICANT CHANGE UP (ref 3.8–5.2)
RBC # BLD: 5 M/UL — SIGNIFICANT CHANGE UP (ref 3.8–5.2)
RBC # FLD: 12.4 % — SIGNIFICANT CHANGE UP (ref 10.3–14.5)
RBC # FLD: 12.6 % — SIGNIFICANT CHANGE UP (ref 10.3–14.5)
RH IG SCN BLD-IMP: NEGATIVE — SIGNIFICANT CHANGE UP
SODIUM SERPL-SCNC: 131 MMOL/L — LOW (ref 135–145)
SODIUM SERPL-SCNC: 137 MMOL/L — SIGNIFICANT CHANGE UP (ref 135–145)
WBC # BLD: 12.02 K/UL — HIGH (ref 3.8–10.5)
WBC # BLD: 16.12 K/UL — HIGH (ref 3.8–10.5)
WBC # FLD AUTO: 12.02 K/UL — HIGH (ref 3.8–10.5)
WBC # FLD AUTO: 16.12 K/UL — HIGH (ref 3.8–10.5)

## 2020-01-25 PROCEDURE — 88305 TISSUE EXAM BY PATHOLOGIST: CPT | Mod: 26

## 2020-01-25 PROCEDURE — 88311 DECALCIFY TISSUE: CPT | Mod: 26

## 2020-01-25 PROCEDURE — 72170 X-RAY EXAM OF PELVIS: CPT | Mod: 26

## 2020-01-25 RX ORDER — SENNA PLUS 8.6 MG/1
2 TABLET ORAL AT BEDTIME
Refills: 0 | Status: DISCONTINUED | OUTPATIENT
Start: 2020-01-25 | End: 2020-01-25

## 2020-01-25 RX ORDER — ATORVASTATIN CALCIUM 80 MG/1
20 TABLET, FILM COATED ORAL AT BEDTIME
Refills: 0 | Status: DISCONTINUED | OUTPATIENT
Start: 2020-01-25 | End: 2020-01-25

## 2020-01-25 RX ORDER — ENOXAPARIN SODIUM 100 MG/ML
40 INJECTION SUBCUTANEOUS EVERY 24 HOURS
Refills: 0 | Status: DISCONTINUED | OUTPATIENT
Start: 2020-01-26 | End: 2020-01-28

## 2020-01-25 RX ORDER — OXYCODONE HYDROCHLORIDE 5 MG/1
2.5 TABLET ORAL EVERY 4 HOURS
Refills: 0 | Status: DISCONTINUED | OUTPATIENT
Start: 2020-01-25 | End: 2020-01-29

## 2020-01-25 RX ORDER — QUETIAPINE FUMARATE 200 MG/1
200 TABLET, FILM COATED ORAL AT BEDTIME
Refills: 0 | Status: DISCONTINUED | OUTPATIENT
Start: 2020-01-25 | End: 2020-01-29

## 2020-01-25 RX ORDER — DONEPEZIL HYDROCHLORIDE 10 MG/1
10 TABLET, FILM COATED ORAL AT BEDTIME
Refills: 0 | Status: DISCONTINUED | OUTPATIENT
Start: 2020-01-25 | End: 2020-01-25

## 2020-01-25 RX ORDER — AMLODIPINE BESYLATE 2.5 MG/1
5 TABLET ORAL DAILY
Refills: 0 | Status: DISCONTINUED | OUTPATIENT
Start: 2020-01-25 | End: 2020-01-25

## 2020-01-25 RX ORDER — WARFARIN SODIUM 2.5 MG/1
2 TABLET ORAL ONCE
Refills: 0 | Status: COMPLETED | OUTPATIENT
Start: 2020-01-25 | End: 2020-01-25

## 2020-01-25 RX ORDER — CEFAZOLIN SODIUM 1 G
2000 VIAL (EA) INJECTION EVERY 8 HOURS
Refills: 0 | Status: COMPLETED | OUTPATIENT
Start: 2020-01-25 | End: 2020-01-26

## 2020-01-25 RX ORDER — MAGNESIUM HYDROXIDE 400 MG/1
30 TABLET, CHEWABLE ORAL DAILY
Refills: 0 | Status: DISCONTINUED | OUTPATIENT
Start: 2020-01-25 | End: 2020-01-29

## 2020-01-25 RX ORDER — MAGNESIUM HYDROXIDE 400 MG/1
30 TABLET, CHEWABLE ORAL DAILY
Refills: 0 | Status: DISCONTINUED | OUTPATIENT
Start: 2020-01-25 | End: 2020-01-25

## 2020-01-25 RX ORDER — SENNA PLUS 8.6 MG/1
2 TABLET ORAL AT BEDTIME
Refills: 0 | Status: DISCONTINUED | OUTPATIENT
Start: 2020-01-25 | End: 2020-01-29

## 2020-01-25 RX ORDER — BISOPROLOL FUMARATE 10 MG/1
2.5 TABLET, FILM COATED ORAL
Refills: 0 | Status: DISCONTINUED | OUTPATIENT
Start: 2020-01-25 | End: 2020-01-29

## 2020-01-25 RX ORDER — MEMANTINE HYDROCHLORIDE 10 MG/1
10 TABLET ORAL
Refills: 0 | Status: DISCONTINUED | OUTPATIENT
Start: 2020-01-25 | End: 2020-01-25

## 2020-01-25 RX ORDER — BENZOCAINE AND MENTHOL 5; 1 G/100ML; G/100ML
1 LIQUID ORAL
Refills: 0 | Status: DISCONTINUED | OUTPATIENT
Start: 2020-01-25 | End: 2020-01-29

## 2020-01-25 RX ORDER — OXYCODONE HYDROCHLORIDE 5 MG/1
5 TABLET ORAL EVERY 4 HOURS
Refills: 0 | Status: DISCONTINUED | OUTPATIENT
Start: 2020-01-25 | End: 2020-01-25

## 2020-01-25 RX ORDER — SODIUM CHLORIDE 9 MG/ML
1000 INJECTION INTRAMUSCULAR; INTRAVENOUS; SUBCUTANEOUS
Refills: 0 | Status: DISCONTINUED | OUTPATIENT
Start: 2020-01-25 | End: 2020-01-25

## 2020-01-25 RX ORDER — QUETIAPINE FUMARATE 200 MG/1
200 TABLET, FILM COATED ORAL AT BEDTIME
Refills: 0 | Status: DISCONTINUED | OUTPATIENT
Start: 2020-01-25 | End: 2020-01-25

## 2020-01-25 RX ORDER — MEMANTINE HYDROCHLORIDE 10 MG/1
10 TABLET ORAL
Refills: 0 | Status: DISCONTINUED | OUTPATIENT
Start: 2020-01-25 | End: 2020-01-29

## 2020-01-25 RX ORDER — LANOLIN ALCOHOL/MO/W.PET/CERES
3 CREAM (GRAM) TOPICAL AT BEDTIME
Refills: 0 | Status: DISCONTINUED | OUTPATIENT
Start: 2020-01-25 | End: 2020-01-29

## 2020-01-25 RX ORDER — AMLODIPINE BESYLATE 2.5 MG/1
5 TABLET ORAL DAILY
Refills: 0 | Status: DISCONTINUED | OUTPATIENT
Start: 2020-01-25 | End: 2020-01-29

## 2020-01-25 RX ORDER — BISOPROLOL FUMARATE 10 MG/1
2.5 TABLET, FILM COATED ORAL
Refills: 0 | Status: DISCONTINUED | OUTPATIENT
Start: 2020-01-25 | End: 2020-01-25

## 2020-01-25 RX ORDER — LANOLIN ALCOHOL/MO/W.PET/CERES
3 CREAM (GRAM) TOPICAL AT BEDTIME
Refills: 0 | Status: DISCONTINUED | OUTPATIENT
Start: 2020-01-25 | End: 2020-01-25

## 2020-01-25 RX ORDER — INFLUENZA VIRUS VACCINE 15; 15; 15; 15 UG/.5ML; UG/.5ML; UG/.5ML; UG/.5ML
0.5 SUSPENSION INTRAMUSCULAR ONCE
Refills: 0 | Status: COMPLETED | OUTPATIENT
Start: 2020-01-25 | End: 2020-01-25

## 2020-01-25 RX ORDER — TRAMADOL HYDROCHLORIDE 50 MG/1
50 TABLET ORAL EVERY 6 HOURS
Refills: 0 | Status: DISCONTINUED | OUTPATIENT
Start: 2020-01-25 | End: 2020-01-29

## 2020-01-25 RX ORDER — OXYCODONE HYDROCHLORIDE 5 MG/1
5 TABLET ORAL EVERY 4 HOURS
Refills: 0 | Status: DISCONTINUED | OUTPATIENT
Start: 2020-01-25 | End: 2020-01-29

## 2020-01-25 RX ORDER — DONEPEZIL HYDROCHLORIDE 10 MG/1
10 TABLET, FILM COATED ORAL AT BEDTIME
Refills: 0 | Status: DISCONTINUED | OUTPATIENT
Start: 2020-01-25 | End: 2020-01-29

## 2020-01-25 RX ORDER — ACETAMINOPHEN 500 MG
975 TABLET ORAL EVERY 8 HOURS
Refills: 0 | Status: DISCONTINUED | OUTPATIENT
Start: 2020-01-25 | End: 2020-01-25

## 2020-01-25 RX ORDER — OXYCODONE HYDROCHLORIDE 5 MG/1
2.5 TABLET ORAL EVERY 4 HOURS
Refills: 0 | Status: DISCONTINUED | OUTPATIENT
Start: 2020-01-25 | End: 2020-01-25

## 2020-01-25 RX ORDER — SODIUM CHLORIDE 9 MG/ML
1000 INJECTION INTRAMUSCULAR; INTRAVENOUS; SUBCUTANEOUS
Refills: 0 | Status: DISCONTINUED | OUTPATIENT
Start: 2020-01-25 | End: 2020-01-29

## 2020-01-25 RX ORDER — PHYTONADIONE (VIT K1) 5 MG
5 TABLET ORAL ONCE
Refills: 0 | Status: COMPLETED | OUTPATIENT
Start: 2020-01-25 | End: 2020-01-25

## 2020-01-25 RX ORDER — ATORVASTATIN CALCIUM 80 MG/1
20 TABLET, FILM COATED ORAL AT BEDTIME
Refills: 0 | Status: DISCONTINUED | OUTPATIENT
Start: 2020-01-25 | End: 2020-01-29

## 2020-01-25 RX ORDER — ACETAMINOPHEN 500 MG
975 TABLET ORAL EVERY 6 HOURS
Refills: 0 | Status: DISCONTINUED | OUTPATIENT
Start: 2020-01-25 | End: 2020-01-29

## 2020-01-25 RX ADMIN — WARFARIN SODIUM 2 MILLIGRAM(S): 2.5 TABLET ORAL at 22:15

## 2020-01-25 RX ADMIN — MEMANTINE HYDROCHLORIDE 10 MILLIGRAM(S): 10 TABLET ORAL at 06:35

## 2020-01-25 RX ADMIN — DONEPEZIL HYDROCHLORIDE 10 MILLIGRAM(S): 10 TABLET, FILM COATED ORAL at 22:04

## 2020-01-25 RX ADMIN — AMLODIPINE BESYLATE 5 MILLIGRAM(S): 2.5 TABLET ORAL at 06:34

## 2020-01-25 RX ADMIN — SODIUM CHLORIDE 125 MILLILITER(S): 9 INJECTION INTRAMUSCULAR; INTRAVENOUS; SUBCUTANEOUS at 03:27

## 2020-01-25 RX ADMIN — Medication 975 MILLIGRAM(S): at 07:06

## 2020-01-25 RX ADMIN — OXYCODONE HYDROCHLORIDE 5 MILLIGRAM(S): 5 TABLET ORAL at 03:24

## 2020-01-25 RX ADMIN — MEMANTINE HYDROCHLORIDE 10 MILLIGRAM(S): 10 TABLET ORAL at 22:03

## 2020-01-25 RX ADMIN — Medication 975 MILLIGRAM(S): at 06:36

## 2020-01-25 RX ADMIN — Medication 975 MILLIGRAM(S): at 22:16

## 2020-01-25 RX ADMIN — OXYCODONE HYDROCHLORIDE 5 MILLIGRAM(S): 5 TABLET ORAL at 07:33

## 2020-01-25 RX ADMIN — ATORVASTATIN CALCIUM 20 MILLIGRAM(S): 80 TABLET, FILM COATED ORAL at 22:01

## 2020-01-25 RX ADMIN — BISOPROLOL FUMARATE 2.5 MILLIGRAM(S): 10 TABLET, FILM COATED ORAL at 22:22

## 2020-01-25 RX ADMIN — BISOPROLOL FUMARATE 2.5 MILLIGRAM(S): 10 TABLET, FILM COATED ORAL at 06:34

## 2020-01-25 RX ADMIN — Medication 100 MILLIGRAM(S): at 22:15

## 2020-01-25 RX ADMIN — SENNA PLUS 2 TABLET(S): 8.6 TABLET ORAL at 22:00

## 2020-01-25 RX ADMIN — Medication 101 MILLIGRAM(S): at 01:33

## 2020-01-25 RX ADMIN — Medication 975 MILLIGRAM(S): at 21:54

## 2020-01-25 NOTE — PRE-ANESTHESIA EVALUATION ADULT - NSANTHPMHFT_GEN_ALL_CORE
chart and med note reviewed. partial hx from daughter. afib on coumadin. no known hx of cad/chf. remote tte from 2009 unremarkable. no signs active cad/chf

## 2020-01-25 NOTE — ED ADULT NURSE NOTE - NSIMPLEMENTINTERV_GEN_ALL_ED
Implemented All Fall Risk Interventions:  [place] to call system. Call bell, personal items and telephone within reach. Instruct patient to call for assistance. Room bathroom lighting operational. Non-slip footwear when patient is off stretcher. Physically safe environment: no spills, clutter or unnecessary equipment. Stretcher in lowest position, wheels locked, appropriate side rails in place. Provide visual cue, wrist band, yellow gown, etc. Monitor gait and stability. Monitor for mental status changes and reorient to person, place, and time. Review medications for side effects contributing to fall risk. Reinforce activity limits and safety measures with patient and family.

## 2020-01-25 NOTE — H&P ADULT - ASSESSMENT
84F with L FN Fracture for OR today    ·	pain control  ·	NPO IVF  ·	FU am INR  ·	appreciate medical clearance  ·	bed rest  ·	hold coumadin for OR   ·	sp 1x IV VIt K    Orth[facility] 1337

## 2020-01-25 NOTE — H&P ADULT - NSHPLABSRESULTS_GEN_ALL_CORE
T(C): 36.6 (01-25-20 @ 03:10)  HR: 93 (01-25-20 @ 03:10)  BP: 139/89 (01-25-20 @ 03:10)  RR: 18 (01-25-20 @ 03:10)  SpO2: 98% (01-25-20 @ 03:10)  Wt(kg): --                        13.7   13.23 )-----------( 216      ( 24 Jan 2020 22:20 )             42.3     01-24    135  |  100  |  18  ----------------------------<  204<H>  5.0   |  23  |  0.88    Ca    9.9      24 Jan 2020 22:20    TPro  7.1  /  Alb  3.9  /  TBili  0.4  /  DBili  x   /  AST  35  /  ALT  25  /  AlkPhos  135<H>  01-24    PT/INR - ( 24 Jan 2020 22:20 )   PT: 22.4 sec;   INR: 1.91 ratio         PTT - ( 24 Jan 2020 22:20 )  PTT:33.2 sec

## 2020-01-25 NOTE — CONSULT NOTE ADULT - ASSESSMENT
83 y/o F PMHx of HTN, HLD, hypothyroidism, Afib on coumadin BIBEMS from the [place] with daughter at bedside c/o fall. Pt states she was walking, tripped over her own feet and had mechanical fall, landed on L hip with pain and inability to ambulate after, denies head trauma or LOC. Pt denies preceding HA, dizziness, CP, or other sxs. Pt only c/o L hip pain in ED, denies back pain, neck pain, HA, CP, abd pain, other extremity pain. Pt would only like Tylenol for pain

## 2020-01-25 NOTE — PRE-ANESTHESIA EVALUATION ADULT - NSANTHASARD_GEN_ALL_CORE
Visit Information    Have you changed or started any medications since your last visit including any over-the-counter medicines, vitamins, or herbal medicines? no   Have you stopped taking any of your medications? Is so, why? -  no  Are you having any side effects from any of your medications? - no    Have you seen any other physician or provider since your last visit?  no   Have you had any other diagnostic tests since your last visit?  no   Have you been seen in the emergency room and/or had an admission in a hospital since we last saw you?  no   Have you had your routine dental cleaning in the past 6 months?  no     Do you have an active MyChart account? If no, what is the barrier? No:     Patient Care Team:  Aaron Serra MD as PCP - Janel Gonzalez MD as PCP - [facility] Attributed Provider    Medical History Review  Past Medical, Family, and Social History reviewed and does not contribute to the patient presenting condition    Health Maintenance   Topic Date Due    Lipid screen  09/22/2009    Potassium monitoring  05/14/2013    Creatinine monitoring  05/14/2013    Cervical cancer screen  05/04/2018    Diabetes screen  03/22/2020 (Originally 9/22/2009)    Flu vaccine (1) 03/22/2027 (Originally 9/1/2018)    Pneumococcal med risk (1 of 1 - PPSV23) 03/22/2027 (Originally 9/22/1988)    HIV screen  03/22/2027 (Originally 9/22/1984)    DTaP/Tdap/Td vaccine (2 - Td) 05/24/2027     Thank you for letting us take care of you today. We hope all your questions were addressed. If a question was overlooked or something else comes to mind after you return home, please contact a member of your Care Team listed below. Please make sure you have a routine office visit set up to follow-up on [address].      Your Care Team at [place] [address] is Team #3  My Berumen MD (Faculty)  Aaron Serra MD (Faculty  Garfield Hollingsworth MD (Resident)  Karena Morejon MD (Resident)   Jaime Oneill MD (Resident)  Francisca Junior MD (Resident)  Summer Carter MD (Resident)  NORMA Chopra., WILLEM Comer., [place] Client ([address] [facility])  Shireen Lamas RN, ([address] [facility])  Sherrie Valenzuela, Ph.D., (Behavioral Services)  Jody Manuel, [facility] (Clinical Pharmacist)     Office phone number: [phone]    If you need to get in right away due to illness, please be advised we have \"Same Day\" appointments available Monday-Friday. Please call us at [phone] option #3 to schedule your \"Same Day\" appointment. 3

## 2020-01-25 NOTE — H&P ADULT - NSICDXPASTSURGICALHX_GEN_ALL_CORE_FT
PAST SURGICAL HISTORY:  S/P Appendectomy age 19.    S/P Colonoscopy excision "benign" polyps.  ( 2004)

## 2020-01-25 NOTE — H&P ADULT - NSICDXPASTMEDICALHX_GEN_ALL_CORE_FT
PAST MEDICAL HISTORY:  Adult Hypothyroidism dx:  9/09.  meds X 3 months.  off meds since 12/09.    Afib paroxysmal.  ( 2008).  treated with metoprolol;    Dementia     History of Renal Calculi no treatment; assymptomatic.    HTN (Hypertension)     Hypercholesterolemia     Murmur MVP

## 2020-01-25 NOTE — H&P ADULT - NSHPPHYSICALEXAM_GEN_ALL_CORE
PE  Gen: NAD, alert and oriented  Resp: Unlabored breathing  LLE: Skin intact, no ecchymosis,        SILT DP/SP/ Bernie/Saph,        +EHL/FHL/TA/Gastroc,        Knee/ankle painless ROM,        hip ROM limited 2/2 pain,       DP+,        soft compartments, no calf ttp,        +log roll.      Secondary:  No TTP over bony landmarks, SILT BL, ROM intact BL, distal pulses palpable.    Imaging:  XR demonstrating L FN hip fracture

## 2020-01-25 NOTE — ED ADULT NURSE NOTE - OBJECTIVE STATEMENT
84yF presents to the ED from [facility] assisted [facility] facility s/p fall. PMH of HTN, HLD, hypothyroidism, Afib on coumadin. Pt states she was walking and "tripped over myself" which led to her falling. Pt landed on her L side and is currently complaining of L hip pain. Pt denies head trauma or LOC. Pt usually uses no assistive devices to ambulate but currently can not ambulate due to pain. Pt has positive pedal pulses BL and is able to move toes. Pt has no bruising at site of pain. Pt denies pain anywhere else. Pt AAOx2 (oriented to self and place) which family states is baseline mental status. Family at bedside. Bed in lowest position with wheels locked and appropriate side rails raised. IV placed. Pt given call bell.

## 2020-01-25 NOTE — ED ADULT NURSE REASSESSMENT NOTE - NS ED NURSE REASSESS COMMENT FT1
Pt AAOx2 (oriented to self and to place) which is baseline mental status per family. VS as documented. Pt and family updated on plan of care to be admitted to surgery for a left closed hip fracture. Pt and family aware of admission and awaiting bed assignment. Pt placed on fracture bed pan earlier but was unable to urinate. Pt then placed on external female catheter and diaper. Pt also placed on 2L nasal cannula for spo2 saturations in the 92-93% on room air. Pt currently saturating at 97% on 2L nasal cannula. Pt complains of pain in L hip rated 8/10. Pt denies pain anywhere else. Family at bedside. Pt and family educated to keep L leg still and avoid movement. Bed in lowest position with wheels locked and appropriate side rails raised.

## 2020-01-25 NOTE — H&P ADULT - HISTORY OF PRESENT ILLNESS
84yFemale c/o L hip pain s/p mechanical fall while she was walking at her facility([facility]). Patient denies head hit or LOC. Patient denies numbness or tingling in the LLE. Patient denies any other injuries. She was unable to ambulate after the fall.     ROS: 10 point review of systems otherwise negative unless noted in HPI

## 2020-01-25 NOTE — CONSULT NOTE ADULT - SUBJECTIVE AND OBJECTIVE BOX
Patient is a 84y old  Female who presents with a chief complaint of     HPI:    83 y/o F PMHx of HTN, HLD, hypothyroidism, Afib on coumadin BIBEMS from the [place] with daughter at bedside c/o fall. Pt states she was walking, tripped over her own feet and had mechanical fall, landed on L hip with pain and inability to ambulate after, denies head trauma or LOC. Pt denies preceding HA, dizziness, CP, or other sxs. Pt only c/o L hip pain in ED, denies back pain, neck pain, HA, CP, abd pain, other extremity pain. Pt would only like Tylenol for pain      MEDICATIONS  (STANDING):  acetaminophen   Tablet .. 975 milliGRAM(s) Oral every 8 hours  senna 2 Tablet(s) Oral at bedtime  sodium chloride 0.9%. 1000 milliLiter(s) (125 mL/Hr) IV Continuous <Continuous>    MEDICATIONS  (PRN):  magnesium hydroxide Suspension 30 milliLiter(s) Oral daily PRN Constipation  melatonin 3 milliGRAM(s) Oral at bedtime PRN Insomnia  oxyCODONE    IR 2.5 milliGRAM(s) Oral every 4 hours PRN Moderate Pain (4 - 6)  oxyCODONE    IR 5 milliGRAM(s) Oral every 4 hours PRN Severe Pain (7 - 10)      Allergies    No Known Allergies    Intolerances      PAST MEDICAL HISTORY:  Dementia  Hypercholesterolemia  History of Renal Calculi  Renal Calculi  Murmur  Afib  HTN (Hypertension)  Hypercholesterolemia  Adult Hypothyroidism    PAST SURGICAL HISTORY:  S/P Colonoscopy  S/P Appendectomy      Diet:  (   ) Regular   (   ) Low Sodium   ( x  ) Low Cholesterol   (   ) Diabetic   (   ) Other    FAMILY HISTORY:  No pertinent family history in first degree relatives      SOCIAL HISTORY:  Substance Use: ( x ) never used  (  ) other:  Tobacco Usage:  ( x  ) never smoked   (   ) former smoker   (   ) current smoker  (   ) pack years  (   ) last cigarette date  Alcohol Usage:  rarely  Advanced Directives: (   ) None    (   ) DNR    (   ) DNI    (   ) Health Care Proxy:     REVIEW OF SYSTEM:  CONSTITUTIONAL: No fever, No change in weight, No fatigue  HEAD: No headache, No dizziness, No recent trauma  EYES: No eye pain, No visual disturbances, No discharge  ENT:  No difficulty hearing, No tinnitus, No vertigo, No sinus pain, No throat pain  NECK: No pain, No stiffness  BREASTS: No pain, No masses, No nipple discharge  RESPIRATORY: No cough, No wheezing, No chills, No hemoptysis, No shortness of breath at rest or exertional shortness of breath  CARDIOVASCULAR: No chest pain, No palpitations, No dizziness, No CHF, No arrhythmia, No cardiomegaly, No leg swelling  GASTROINTESTINAL: No abdominal, No epigastric pain. No nausea, No vomiting, No hematemesis, No diarrhea, No constipation. No melena, No hematochezia. No GERD  GENITOURINARY: No dysuria, No frequency, No hematuria, No incontinence, No nocturia, No hesitancy,  SKIN: No itching, No burning, No rashes, No lesions   LYMPH NODES: No history of enlarged glands  ENDOCRINE: No heat or cold intolerance, No hair loss. No osteoporosis, No thyroid disease  MUSCULOSKELETAL: No joint pain or swelling, (+) left hip pain   PSYCHIATRIC: No depression, No anxiety, No mood swings, No difficulty sleeping  HEME/LYMPH: No easy bruising, No anticoagulants, No bleeding disorder, No bleeding gums  ALLERGY AND IMMUNOLOGIC: No hives, No eczema  NEUROLOGICAL: No memory loss, No loss of strength, No numbness, No tremors    VITALS:  Vital Signs Last 24 Hrs  T(C): 36.6 (25 Jan 2020 03:10), Max: 36.6 (25 Jan 2020 03:10)  T(F): 97.9 (25 Jan 2020 03:10), Max: 97.9 (25 Jan 2020 03:10)  HR: 93 (25 Jan 2020 03:10) (81 - 93)  BP: 139/89 (25 Jan 2020 03:10) (95/59 - 147/80)  BP(mean): --  RR: 18 (25 Jan 2020 03:10) (16 - 18)  SpO2: 98% (25 Jan 2020 03:10) (93% - 98%)  I&O's Summary      PHYSICAL EXAM:  GENERAL: NAD, well nourished and conversant  HEAD:  Atraumatic  EYES: EOM, PERRLA, conjunctiva pink and sclera white  ENT: No tonsillar erythema, exudates, or enlargement, moist mucous membranes, good dentition, no lesions  NECK: Supple, No JVD, normal thyroid, carotids with normal upstrokes and no bruits  CHEST/LUNG: Clear to auscultation bilaterally, No rales, rhonchi, wheezing, or rubs  HEART: Regular rate and rhythm, No murmurs, rubs, or gallops  ABDOMEN: Soft, nondistended, no masses, guarding, tenderness or rebound, bowel sounds present  EXTREMITIES:  2+ Peripheral Pulses, No clubbing, cyanosis, or edema.   (+) left hip fracture  LYMPH: No lymphadenopathy noted  SKIN: No rashes or lesions  NERVOUS SYSTEM:  Alert & Oriented X3, normal cognitive function. Motor Strength 5/5 right upper and right lower.  5/5 left upper and left lower extremities, DTRs 2+ intact and symmetric    LABS:  EKG  rate controlled a fib no  ischemia      CBC Full  -  ( 24 Jan 2020 22:20 )  WBC Count : 13.23 K/uL  RBC Count : 4.70 M/uL  Hemoglobin : 13.7 g/dL  Hematocrit : 42.3 %  Platelet Count - Automated : 216 K/uL  Mean Cell Volume : 90.0 fl  Mean Cell Hemoglobin : 29.1 pg  Mean Cell Hemoglobin Concentration : 32.4 gm/dL  Auto Neutrophil # : 10.83 K/uL  Auto Lymphocyte # : 1.49 K/uL  Auto Monocyte # : 0.65 K/uL  Auto Eosinophil # : 0.06 K/uL  Auto Basophil # : 0.13 K/uL  Auto Neutrophil % : 81.8 %  Auto Lymphocyte % : 11.3 %  Auto Monocyte % : 4.9 %  Auto Eosinophil % : 0.5 %  Auto Basophil % : 1.0 %    01-24    135  |  100  |  18  ----------------------------<  204<H>  5.0   |  23  |  0.88    Ca    9.9      24 Jan 2020 22:20    TPro  7.1  /  Alb  3.9  /  TBili  0.4  /  DBili  x   /  AST  35  /  ALT  25  /  AlkPhos  135<H>  01-24    LIVER FUNCTIONS - ( 24 Jan 2020 22:20 )  Alb: 3.9 g/dL / Pro: 7.1 g/dL / ALK PHOS: 135 U/L / ALT: 25 U/L / AST: 35 U/L / GGT: x           PT/INR - ( 24 Jan 2020 22:20 )   PT: 22.4 sec;   INR: 1.91 ratio         PTT - ( 24 Jan 2020 22:20 )  PTT:33.2 sec    CAPILLARY BLOOD GLUCOSE          RADIOLOGY & ADDITIONAL TESTS:    Consultant(s):    Care Discussed with Consultants/Other Providers [ ] YES  [ ] NO

## 2020-01-25 NOTE — CHART NOTE - NSCHARTNOTEFT_GEN_A_CORE
Pt S/E at bedside, tolerated procedure well, pain controlled. No complaints at this time, resting comfortably.    Vital Signs Last 24 Hrs  T(C): 36.7 (25 Jan 2020 18:43), Max: 36.7 (25 Jan 2020 18:43)  T(F): 98.1 (25 Jan 2020 18:43), Max: 98.1 (25 Jan 2020 18:43)  HR: 89 (25 Jan 2020 18:43) (81 - 119)  BP: 131/76 (25 Jan 2020 18:43) (95/59 - 169/74)  BP(mean): 87 (25 Jan 2020 18:00) (83 - 110)  RR: 18 (25 Jan 2020 18:43) (14 - 18)  SpO2: 94% (25 Jan 2020 18:43) (93% - 98%)    Gen: NAD, awake/alert, confused    Left Lower Extremity:  Dressing clean dry intact  ABD pillow in place  +EHL/FHL/TA/GS  SILT L3-S1  +DP/PT Pulses  Compartments soft  No calf TTP B/L     84F s/p L hip hemiarthroplasty POD 0    Analgesia  DVT ppx with lovenox, bridge to coumadin  WBAT LLE with posterior hip precautions  PT/OT  Incentive spirometry  DC planning

## 2020-01-25 NOTE — PROGRESS NOTE ADULT - SUBJECTIVE AND OBJECTIVE BOX
Ortho Progress Note    S: Patient seen and examined. No acute events overnight. Pain well controlled with current regimen. Denies lightheadedness/dizziness, CP/SOB.    O:  Physical Exam:  Gen: Laying in bed, NAD, alert and oriented.   Resp: Unlabored breathing  Ext: EHL/FHL/TA/Sol intact          + SILT DP/SP/GARCIA/Sa/Tib          +DP, extremity WWP    Vital Signs Last 24 Hrs  T(C): 36.4 (25 Jan 2020 05:42), Max: 36.6 (25 Jan 2020 03:10)  T(F): 97.6 (25 Jan 2020 05:42), Max: 97.9 (25 Jan 2020 03:10)  HR: 84 (25 Jan 2020 05:42) (81 - 93)  BP: 141/82 (25 Jan 2020 05:42) (95/59 - 147/80)  BP(mean): --  RR: 18 (25 Jan 2020 05:42) (16 - 18)  SpO2: 96% (25 Jan 2020 05:42) (93% - 98%)                          13.6   12.02 )-----------( 220      ( 25 Jan 2020 05:40 )             42.4                         13.7   13.23 )-----------( 216      ( 24 Jan 2020 22:20 )             42.3       01-25    131<L>  |  96  |  17  ----------------------------<  200<H>  4.2   |  21<L>  |  0.82        PT/INR - ( 25 Jan 2020 05:40 )   PT: 19.5 sec;   INR: 1.67 ratio         PTT - ( 25 Jan 2020 05:40 )  PTT:29.2 sec

## 2020-01-26 LAB
ANION GAP SERPL CALC-SCNC: 11 MMOL/L — SIGNIFICANT CHANGE UP (ref 5–17)
APTT BLD: 25.7 SEC — LOW (ref 27.5–36.3)
BASOPHILS # BLD AUTO: 0.04 K/UL — SIGNIFICANT CHANGE UP (ref 0–0.2)
BASOPHILS NFR BLD AUTO: 0.3 % — SIGNIFICANT CHANGE UP (ref 0–2)
BUN SERPL-MCNC: 13 MG/DL — SIGNIFICANT CHANGE UP (ref 7–23)
CALCIUM SERPL-MCNC: 9.2 MG/DL — SIGNIFICANT CHANGE UP (ref 8.4–10.5)
CHLORIDE SERPL-SCNC: 102 MMOL/L — SIGNIFICANT CHANGE UP (ref 96–108)
CO2 SERPL-SCNC: 23 MMOL/L — SIGNIFICANT CHANGE UP (ref 22–31)
CREAT SERPL-MCNC: 0.74 MG/DL — SIGNIFICANT CHANGE UP (ref 0.5–1.3)
EOSINOPHIL # BLD AUTO: 0.02 K/UL — SIGNIFICANT CHANGE UP (ref 0–0.5)
EOSINOPHIL NFR BLD AUTO: 0.2 % — SIGNIFICANT CHANGE UP (ref 0–6)
GLUCOSE SERPL-MCNC: 156 MG/DL — HIGH (ref 70–99)
HCT VFR BLD CALC: 38.1 % — SIGNIFICANT CHANGE UP (ref 34.5–45)
HGB BLD-MCNC: 12.3 G/DL — SIGNIFICANT CHANGE UP (ref 11.5–15.5)
IMM GRANULOCYTES NFR BLD AUTO: 0.4 % — SIGNIFICANT CHANGE UP (ref 0–1.5)
INR BLD: 1.13 RATIO — SIGNIFICANT CHANGE UP (ref 0.88–1.16)
LYMPHOCYTES # BLD AUTO: 1.23 K/UL — SIGNIFICANT CHANGE UP (ref 1–3.3)
LYMPHOCYTES # BLD AUTO: 10.2 % — LOW (ref 13–44)
MCHC RBC-ENTMCNC: 29.5 PG — SIGNIFICANT CHANGE UP (ref 27–34)
MCHC RBC-ENTMCNC: 32.3 GM/DL — SIGNIFICANT CHANGE UP (ref 32–36)
MCV RBC AUTO: 91.4 FL — SIGNIFICANT CHANGE UP (ref 80–100)
MONOCYTES # BLD AUTO: 0.89 K/UL — SIGNIFICANT CHANGE UP (ref 0–0.9)
MONOCYTES NFR BLD AUTO: 7.4 % — SIGNIFICANT CHANGE UP (ref 2–14)
NEUTROPHILS # BLD AUTO: 9.86 K/UL — HIGH (ref 1.8–7.4)
NEUTROPHILS NFR BLD AUTO: 81.5 % — HIGH (ref 43–77)
PLATELET # BLD AUTO: 204 K/UL — SIGNIFICANT CHANGE UP (ref 150–400)
POTASSIUM SERPL-MCNC: 4.1 MMOL/L — SIGNIFICANT CHANGE UP (ref 3.5–5.3)
POTASSIUM SERPL-SCNC: 4.1 MMOL/L — SIGNIFICANT CHANGE UP (ref 3.5–5.3)
PROTHROM AB SERPL-ACNC: 13.1 SEC — HIGH (ref 10–12.9)
RBC # BLD: 4.17 M/UL — SIGNIFICANT CHANGE UP (ref 3.8–5.2)
RBC # FLD: 12.5 % — SIGNIFICANT CHANGE UP (ref 10.3–14.5)
SODIUM SERPL-SCNC: 136 MMOL/L — SIGNIFICANT CHANGE UP (ref 135–145)
WBC # BLD: 12.09 K/UL — HIGH (ref 3.8–10.5)
WBC # FLD AUTO: 12.09 K/UL — HIGH (ref 3.8–10.5)

## 2020-01-26 RX ORDER — WARFARIN SODIUM 2.5 MG/1
2 TABLET ORAL ONCE
Refills: 0 | Status: COMPLETED | OUTPATIENT
Start: 2020-01-26 | End: 2020-01-26

## 2020-01-26 RX ADMIN — MEMANTINE HYDROCHLORIDE 10 MILLIGRAM(S): 10 TABLET ORAL at 09:21

## 2020-01-26 RX ADMIN — QUETIAPINE FUMARATE 200 MILLIGRAM(S): 200 TABLET, FILM COATED ORAL at 21:19

## 2020-01-26 RX ADMIN — BISOPROLOL FUMARATE 2.5 MILLIGRAM(S): 10 TABLET, FILM COATED ORAL at 17:09

## 2020-01-26 RX ADMIN — Medication 975 MILLIGRAM(S): at 15:23

## 2020-01-26 RX ADMIN — WARFARIN SODIUM 2 MILLIGRAM(S): 2.5 TABLET ORAL at 21:22

## 2020-01-26 RX ADMIN — Medication 975 MILLIGRAM(S): at 09:21

## 2020-01-26 RX ADMIN — QUETIAPINE FUMARATE 200 MILLIGRAM(S): 200 TABLET, FILM COATED ORAL at 00:42

## 2020-01-26 RX ADMIN — Medication 1 TABLET(S): at 12:05

## 2020-01-26 RX ADMIN — Medication 100 MILLIGRAM(S): at 05:36

## 2020-01-26 RX ADMIN — SENNA PLUS 2 TABLET(S): 8.6 TABLET ORAL at 21:19

## 2020-01-26 RX ADMIN — MEMANTINE HYDROCHLORIDE 10 MILLIGRAM(S): 10 TABLET ORAL at 17:10

## 2020-01-26 RX ADMIN — Medication 975 MILLIGRAM(S): at 03:07

## 2020-01-26 RX ADMIN — DONEPEZIL HYDROCHLORIDE 10 MILLIGRAM(S): 10 TABLET, FILM COATED ORAL at 21:19

## 2020-01-26 RX ADMIN — Medication 975 MILLIGRAM(S): at 15:53

## 2020-01-26 RX ADMIN — BISOPROLOL FUMARATE 2.5 MILLIGRAM(S): 10 TABLET, FILM COATED ORAL at 05:27

## 2020-01-26 RX ADMIN — Medication 975 MILLIGRAM(S): at 09:50

## 2020-01-26 RX ADMIN — AMLODIPINE BESYLATE 5 MILLIGRAM(S): 2.5 TABLET ORAL at 05:27

## 2020-01-26 RX ADMIN — Medication 975 MILLIGRAM(S): at 21:19

## 2020-01-26 RX ADMIN — Medication 975 MILLIGRAM(S): at 21:50

## 2020-01-26 RX ADMIN — ENOXAPARIN SODIUM 40 MILLIGRAM(S): 100 INJECTION SUBCUTANEOUS at 05:26

## 2020-01-26 RX ADMIN — ATORVASTATIN CALCIUM 20 MILLIGRAM(S): 80 TABLET, FILM COATED ORAL at 21:19

## 2020-01-26 NOTE — PHYSICAL THERAPY INITIAL EVALUATION ADULT - GENERAL OBSERVATIONS, REHAB EVAL
Pt received supine in bed in NAD, +IV, + O2 2L NC, +primafit, +hip abd pillow. Pt Aox1, severe dementia, know name and , can follow instructions well, pleasant and cooperative.

## 2020-01-26 NOTE — PROGRESS NOTE ADULT - ASSESSMENT
84F s/p L hip hemiarthroplasty POD 1  Analgesia  DVT ppx with lovenox and bridge to coumadin  WBAT LLE with posterior hip precautions  PT/OT  Incentive spirometry  DC planning

## 2020-01-26 NOTE — PHYSICAL THERAPY INITIAL EVALUATION ADULT - ADDITIONAL COMMENTS
Pt lived at the [facility] and was independent, owns a RW but did not use. Daughter would go once a week to help patient shower.

## 2020-01-26 NOTE — PROGRESS NOTE ADULT - SUBJECTIVE AND OBJECTIVE BOX
Pt S/E at bedside, no acute events overnight, pain controlled. Resting comfortably this morning with no complaints.    Vital Signs Last 24 Hrs  T(C): 36.3 (26 Jan 2020 05:11), Max: 36.8 (25 Jan 2020 19:45)  T(F): 97.4 (26 Jan 2020 05:11), Max: 98.2 (25 Jan 2020 19:45)  HR: 75 (26 Jan 2020 05:11) (75 - 119)  BP: 119/72 (26 Jan 2020 05:11) (103/63 - 169/74)  BP(mean): 87 (25 Jan 2020 18:00) (83 - 110)  RR: 18 (26 Jan 2020 05:11) (14 - 18)  SpO2: 95% (26 Jan 2020 05:11) (94% - 98%)    Gen: NAD, awake/alert    Left Lower Extremity:  Dressing clean dry intact  +abd pillow in place  +EHL/FHL/TA/GS  SILT L3-S1  +DP/PT Pulses  Compartments soft  No calf TTP B/L

## 2020-01-26 NOTE — PHYSICAL THERAPY INITIAL EVALUATION ADULT - IMPAIRMENTS FOUND, PT EVAL
aerobic capacity/endurance/cognitive impairment/arousal, attention, and cognition/gait, locomotion, and balance/muscle strength

## 2020-01-26 NOTE — PHYSICAL THERAPY INITIAL EVALUATION ADULT - CRITERIA FOR SKILLED THERAPEUTIC INTERVENTIONS
impairments found/Subacute/functional limitations in following categories/risk reduction/prevention/anticipated discharge recommendation

## 2020-01-26 NOTE — PHYSICAL THERAPY INITIAL EVALUATION ADULT - PERTINENT HX OF CURRENT PROBLEM, REHAB EVAL
84y Female c/o L hip pain s/p mechanical fall while she was walking at her facility ([facility]). Patient denies head hit or LOC. Patient denies numbness or tingling in the LLE. Patient denies any other injuries. She was unable to ambulate after the fall. (+) L Neck of Femur fx s/p Posterior L hip Hemiarthroplasty 84y Female c/o L hip pain s/p mechanical fall while she was walking at her facility ([facility]). Patient denies head hit or LOC. Patient denies numbness or tingling in the LLE. Patient denies any other injuries. She was unable to ambulate after the fall. (+) Acute impacted left femoral neck fracture. Now s/p Posterior L hip Hemiarthroplasty

## 2020-01-26 NOTE — PROGRESS NOTE ADULT - ASSESSMENT
85 y/o F PMHx of HTN, HLD, hypothyroidism, Afib on coumadin BIBEMS from the [facility] with daughter at bedside c/o fall. Pt states she was walking, tripped over her own feet and had mechanical fall, landed on L hip with pain and inability to ambulate after, denies head trauma or LOC. Pt denies preceding HA, dizziness, CP, or other sxs. Pt only c/o L hip pain in ED, denies back pain, neck pain, HA, CP, abd pain, other extremity pain. Pt would only like Tylenol for pain     Problem/Recommendation - 1:  Problem: Closed fracture of left hip, initial encounter. Recommendation: Medically stable to proceed to or as planned.     Problem/Recommendation - 2:  ·  Problem: Dementia.  Recommendation: If needed use Seroquel or Haldol for agitation.      Problem/Recommendation - 3:  ·  Problem: Afib.  Recommendation: Rate controlled no ischemia.      Problem/Recommendation - 4:  ·  Problem: HTN (Hypertension).  Recommendation: Bp under control.      Problem/Recommendation - 5:  ·  Problem: Hypercholesterolemia.  Recommendation: Goal is LDL < 75.      Problem/Recommendation - 6:  Problem: Adult Hypothyroidism. Recommendation: Check TFTs.    Attending Attestation:   40 minutes spent on total encounter; more than 50% of the visit was spent counseling and/or coordinating care by the attending physician.     Plan discussed with Patient and staff.    Jose Rios MD

## 2020-01-26 NOTE — PROGRESS NOTE ADULT - SUBJECTIVE AND OBJECTIVE BOX
Patient is a 84y old  Female who presents with a chief complaint of Left Hip Fracture (26 Jan 2020 06:24)      HPI:    85 y/o F PMHx of HTN, HLD, hypothyroidism, Afib on coumadin BIBEMS from the [facility] with daughter at bedside c/o fall. Pt states she was walking, tripped over her own feet and had mechanical fall, landed on L hip with pain and inability to ambulate after, denies head trauma or LOC. Pt denies preceding HA, dizziness, CP, or other sxs. Pt only c/o L hip pain in ED, denies back pain, neck pain, HA, CP, abd pain, other extremity pain. Pt would only like Tylenol for pain.  She underwent:  ·  POST-OP DIAGNOSIS:  Femoral neck fracture 25-Jan-2020 15:46:48  Ciaran Mendoza.  ·  PROCEDURES:  Hemiarthroplasty of left hip 25-Jan-2020 15:46:28  Ciaran Mendoza.  · Operative Findings	Posterior approach left hip hemiarthroplasty.	        MEDICATIONS  (STANDING):  acetaminophen   Tablet .. 975 milliGRAM(s) Oral every 6 hours  amLODIPine   Tablet 5 milliGRAM(s) Oral daily  atorvastatin 20 milliGRAM(s) Oral at bedtime  bisoprolol   Tablet 2.5 milliGRAM(s) Oral <User Schedule>  calcium carbonate 1250 mG  + Vitamin D (OsCal 500 + D) 1 Tablet(s) Oral daily  donepezil 10 milliGRAM(s) Oral at bedtime  enoxaparin Injectable 40 milliGRAM(s) SubCutaneous every 24 hours  influenza   Vaccine 0.5 milliLiter(s) IntraMuscular once  memantine 10 milliGRAM(s) Oral two times a day  QUEtiapine 200 milliGRAM(s) Oral at bedtime  senna 2 Tablet(s) Oral at bedtime  sodium chloride 0.9%. 1000 milliLiter(s) (100 mL/Hr) IV Continuous <Continuous>    MEDICATIONS  (PRN):  benzocaine 15 mG/menthol 3.6 mG (Sugar-Free) Lozenge 1 Lozenge Oral four times a day PRN Sore Throat  magnesium hydroxide Suspension 30 milliLiter(s) Oral daily PRN Constipation  melatonin 3 milliGRAM(s) Oral at bedtime PRN Insomnia  oxyCODONE    IR 2.5 milliGRAM(s) Oral every 4 hours PRN Moderate Pain (4 - 6)  oxyCODONE    IR 5 milliGRAM(s) Oral every 4 hours PRN Severe Pain (7 - 10)  traMADol 50 milliGRAM(s) Oral every 6 hours PRN Mild Pain (1 - 3)      Allergies    No Known Allergies    Intolerances      VITALS:   T(C): 36.7 (01-26-20 @ 22:00), Max: 36.9 (01-26-20 @ 17:03)  HR: 80 (01-26-20 @ 22:00) (71 - 88)  BP: 142/64 (01-26-20 @ 22:00) (117/60 - 142/64)  RR: 18 (01-26-20 @ 22:00) (18 - 18)  SpO2: 95% (01-26-20 @ 22:00) (94% - 96%)  Wt(kg): --    01-25 @ 07:01  -  01-26 @ 07:00  --------------------------------------------------------  IN: 1600 mL / OUT: 801 mL / NET: 799 mL    01-26 @ 07:01  -  01-26 @ 23:10  --------------------------------------------------------  IN: 680 mL / OUT: 650 mL / NET: 30 mL        PHYSICAL EXAM:  GENERAL: NAD, well nourished and conversant  HEAD:  Atraumatic  EYES: EOM, PERRLA, conjunctiva pink and sclera white  ENT: No tonsillar erythema, exudates, or enlargement, moist mucous membranes, good dentition, no lesions  NECK: Supple, No JVD, normal thyroid, carotids with normal upstrokes and no bruits  CHEST/LUNG: Clear to auscultation bilaterally, No rales, rhonchi, wheezing, or rubs  HEART: Regular rate and rhythm, No murmurs, rubs, or gallops  ABDOMEN: Soft, nondistended, no masses, guarding, tenderness or rebound, bowel sounds present  EXTREMITIES:  (+) hemiarthroplasty left hip  LYMPH: No lymphadenopathy noted  SKIN: No rashes or lesions  NERVOUS SYSTEM:  Alert & Oriented X3, normal cognitive function. Motor Strength 5/5 right upper and right lower.  5/5 left upper and left lower extremities, DTRs 2+ intact and symmetric    LABS:                          12.3   12.09 )-----------( 204      ( 26 Jan 2020 09:04 )             38.1     01-26    136  |  102  |  13  ----------------------------<  156<H>  4.1   |  23  |  0.74  01-25    137  |  98  |  12  ----------------------------<  205<H>  4.7   |  23  |  0.67  01-25    131<L>  |  96  |  17  ----------------------------<  200<H>  4.2   |  21<L>  |  0.82    Ca    9.2      26 Jan 2020 06:50  Ca    9.0      25 Jan 2020 16:56  Ca    9.6      25 Jan 2020 05:40    TPro  7.1  /  Alb  3.9  /  TBili  0.4  /  DBili  x   /  AST  35  /  ALT  25  /  AlkPhos  135<H>  01-24    CAPILLARY BLOOD GLUCOSE

## 2020-01-27 LAB
ANION GAP SERPL CALC-SCNC: 11 MMOL/L — SIGNIFICANT CHANGE UP (ref 5–17)
BUN SERPL-MCNC: 18 MG/DL — SIGNIFICANT CHANGE UP (ref 7–23)
CALCIUM SERPL-MCNC: 9.2 MG/DL — SIGNIFICANT CHANGE UP (ref 8.4–10.5)
CHLORIDE SERPL-SCNC: 104 MMOL/L — SIGNIFICANT CHANGE UP (ref 96–108)
CO2 SERPL-SCNC: 25 MMOL/L — SIGNIFICANT CHANGE UP (ref 22–31)
CREAT SERPL-MCNC: 0.87 MG/DL — SIGNIFICANT CHANGE UP (ref 0.5–1.3)
GLUCOSE SERPL-MCNC: 123 MG/DL — HIGH (ref 70–99)
HCT VFR BLD CALC: 37.1 % — SIGNIFICANT CHANGE UP (ref 34.5–45)
HGB BLD-MCNC: 12.4 G/DL — SIGNIFICANT CHANGE UP (ref 11.5–15.5)
INR BLD: 1.44 RATIO — HIGH (ref 0.88–1.16)
MCHC RBC-ENTMCNC: 30.5 PG — SIGNIFICANT CHANGE UP (ref 27–34)
MCHC RBC-ENTMCNC: 33.4 GM/DL — SIGNIFICANT CHANGE UP (ref 32–36)
MCV RBC AUTO: 91.4 FL — SIGNIFICANT CHANGE UP (ref 80–100)
PLATELET # BLD AUTO: 181 K/UL — SIGNIFICANT CHANGE UP (ref 150–400)
POTASSIUM SERPL-MCNC: 4.3 MMOL/L — SIGNIFICANT CHANGE UP (ref 3.5–5.3)
POTASSIUM SERPL-SCNC: 4.3 MMOL/L — SIGNIFICANT CHANGE UP (ref 3.5–5.3)
PROTHROM AB SERPL-ACNC: 16.6 SEC — HIGH (ref 10–13.1)
RBC # BLD: 4.06 M/UL — SIGNIFICANT CHANGE UP (ref 3.8–5.2)
RBC # FLD: 13 % — SIGNIFICANT CHANGE UP (ref 10.3–14.5)
SODIUM SERPL-SCNC: 140 MMOL/L — SIGNIFICANT CHANGE UP (ref 135–145)
WBC # BLD: 10.69 K/UL — HIGH (ref 3.8–10.5)
WBC # FLD AUTO: 10.69 K/UL — HIGH (ref 3.8–10.5)

## 2020-01-27 RX ORDER — WARFARIN SODIUM 2.5 MG/1
2 TABLET ORAL ONCE
Refills: 0 | Status: COMPLETED | OUTPATIENT
Start: 2020-01-27 | End: 2020-01-27

## 2020-01-27 RX ADMIN — Medication 975 MILLIGRAM(S): at 10:30

## 2020-01-27 RX ADMIN — SENNA PLUS 2 TABLET(S): 8.6 TABLET ORAL at 21:03

## 2020-01-27 RX ADMIN — Medication 975 MILLIGRAM(S): at 18:01

## 2020-01-27 RX ADMIN — MEMANTINE HYDROCHLORIDE 10 MILLIGRAM(S): 10 TABLET ORAL at 17:32

## 2020-01-27 RX ADMIN — QUETIAPINE FUMARATE 200 MILLIGRAM(S): 200 TABLET, FILM COATED ORAL at 21:03

## 2020-01-27 RX ADMIN — Medication 975 MILLIGRAM(S): at 06:12

## 2020-01-27 RX ADMIN — ENOXAPARIN SODIUM 40 MILLIGRAM(S): 100 INJECTION SUBCUTANEOUS at 06:13

## 2020-01-27 RX ADMIN — DONEPEZIL HYDROCHLORIDE 10 MILLIGRAM(S): 10 TABLET, FILM COATED ORAL at 21:03

## 2020-01-27 RX ADMIN — BISOPROLOL FUMARATE 2.5 MILLIGRAM(S): 10 TABLET, FILM COATED ORAL at 06:12

## 2020-01-27 RX ADMIN — Medication 1 TABLET(S): at 11:50

## 2020-01-27 RX ADMIN — Medication 975 MILLIGRAM(S): at 17:33

## 2020-01-27 RX ADMIN — Medication 975 MILLIGRAM(S): at 10:02

## 2020-01-27 RX ADMIN — ATORVASTATIN CALCIUM 20 MILLIGRAM(S): 80 TABLET, FILM COATED ORAL at 21:03

## 2020-01-27 RX ADMIN — BISOPROLOL FUMARATE 2.5 MILLIGRAM(S): 10 TABLET, FILM COATED ORAL at 17:30

## 2020-01-27 RX ADMIN — MEMANTINE HYDROCHLORIDE 10 MILLIGRAM(S): 10 TABLET ORAL at 06:13

## 2020-01-27 RX ADMIN — AMLODIPINE BESYLATE 5 MILLIGRAM(S): 2.5 TABLET ORAL at 06:13

## 2020-01-27 RX ADMIN — WARFARIN SODIUM 2 MILLIGRAM(S): 2.5 TABLET ORAL at 21:03

## 2020-01-27 NOTE — PROGRESS NOTE ADULT - ASSESSMENT
84F s/p L hip hemiarthroplasty POD 2    Analgesia  DVT ppx with lovenox bridging to coumadin  WBAT LLE with posterior hip precautions  PT/OT  Incentive spirometry  DC planning

## 2020-01-27 NOTE — PROGRESS NOTE ADULT - SUBJECTIVE AND OBJECTIVE BOX
83 y/o F PMHx of HTN, HLD, hypothyroidism, Afib on coumadin BIBEMS from the [facility] with daughter at bedside c/o fall. Pt states she was walking, tripped over her own feet and had mechanical fall, landed on L hip with pain and inability to ambulate after, denies head trauma or LOC. Pt denies preceding HA, dizziness, CP, or other sxs. Pt only c/o L hip pain in ED, denies back pain, neck pain, HA, CP, abd pain, other extremity pain. Pt would only like Tylenol for pain.  Patient s/p ORIOF of the left hip. Seen OOB in chair, pain is well controlled .    MEDICATIONS  (STANDING):  acetaminophen   Tablet .. 975 milliGRAM(s) Oral every 6 hours  amLODIPine   Tablet 5 milliGRAM(s) Oral daily  atorvastatin 20 milliGRAM(s) Oral at bedtime  bisoprolol   Tablet 2.5 milliGRAM(s) Oral <User Schedule>  calcium carbonate 1250 mG  + Vitamin D (OsCal 500 + D) 1 Tablet(s) Oral daily  donepezil 10 milliGRAM(s) Oral at bedtime  enoxaparin Injectable 40 milliGRAM(s) SubCutaneous every 24 hours  influenza   Vaccine 0.5 milliLiter(s) IntraMuscular once  memantine 10 milliGRAM(s) Oral two times a day  QUEtiapine 200 milliGRAM(s) Oral at bedtime  senna 2 Tablet(s) Oral at bedtime  sodium chloride 0.9%. 1000 milliLiter(s) (100 mL/Hr) IV Continuous <Continuous>  warfarin 2 milliGRAM(s) Oral once    MEDICATIONS  (PRN):  benzocaine 15 mG/menthol 3.6 mG (Sugar-Free) Lozenge 1 Lozenge Oral four times a day PRN Sore Throat  bisacodyl Suppository 10 milliGRAM(s) Rectal daily PRN If no bowel movement  magnesium hydroxide Suspension 30 milliLiter(s) Oral daily PRN Constipation  melatonin 3 milliGRAM(s) Oral at bedtime PRN Insomnia  oxyCODONE    IR 2.5 milliGRAM(s) Oral every 4 hours PRN Moderate Pain (4 - 6)  oxyCODONE    IR 5 milliGRAM(s) Oral every 4 hours PRN Severe Pain (7 - 10)  traMADol 50 milliGRAM(s) Oral every 6 hours PRN Mild Pain (1 - 3)          VITALS:   T(C): 37.3 (01-27-20 @ 17:28), Max: 37.3 (01-27-20 @ 17:28)  HR: 98 (01-27-20 @ 17:32) (76 - 98)  BP: 142/72 (01-27-20 @ 17:32) (120/65 - 142/72)  RR: 18 (01-27-20 @ 13:23) (18 - 18)  SpO2: 98% (01-27-20 @ 17:32) (95% - 98%)  Wt(kg): --    PHYSICAL EXAM:  GENERAL: NAD, well nourished and conversant  HEAD:  Atraumatic  EYES: EOM, PERRLA, conjunctiva pink and sclera white  ENT: No tonsillar erythema, exudates, or enlargement, moist mucous membranes, good dentition, no lesions  NECK: Supple, No JVD, normal thyroid, carotids with normal upstrokes and no bruits  CHEST/LUNG: Clear to auscultation bilaterally, No rales, rhonchi, wheezing, or rubs  HEART: Regular rate and rhythm, No murmurs, rubs, or gallops  ABDOMEN: Soft, nondistended, no masses, guarding, tenderness or rebound, bowel sounds present  EXTREMITIES:  (+) hemiarthroplasty left hip  LYMPH: No lymphadenopathy noted  SKIN: No rashes or lesions  NERVOUS SYSTEM:  Alert & Oriented X3, normal cognitive function. Motor Strength 5/5 right upper and right lower.  5/5 left upper and left lower extremities, DTRs 2+ intact and symmetric    LABS:        CBC Full  -  ( 27 Jan 2020 07:49 )  WBC Count : 10.69 K/uL  RBC Count : 4.06 M/uL  Hemoglobin : 12.4 g/dL  Hematocrit : 37.1 %  Platelet Count - Automated : 181 K/uL  Mean Cell Volume : 91.4 fl  Mean Cell Hemoglobin : 30.5 pg  Mean Cell Hemoglobin Concentration : 33.4 gm/dL  Auto Neutrophil # : x  Auto Lymphocyte # : x  Auto Monocyte # : x  Auto Eosinophil # : x  Auto Basophil # : x  Auto Neutrophil % : x  Auto Lymphocyte % : x  Auto Monocyte % : x  Auto Eosinophil % : x  Auto Basophil % : x    01-27    140  |  104  |  18  ----------------------------<  123<H>  4.3   |  25  |  0.87    Ca    9.2      27 Jan 2020 06:39        PT/INR - ( 27 Jan 2020 08:38 )   PT: 16.6 sec;   INR: 1.44 ratio         PTT - ( 26 Jan 2020 09:24 )  PTT:25.7 sec    CAPILLARY BLOOD GLUCOSE          RADIOLOGY & ADDITIONAL TESTS:

## 2020-01-27 NOTE — PROGRESS NOTE ADULT - SUBJECTIVE AND OBJECTIVE BOX
Pt S/E at bedside, no acute events overnight, pain controlled. No complaints this morning.    Vital Signs Last 24 Hrs  T(C): 36.4 (27 Jan 2020 06:07), Max: 36.9 (26 Jan 2020 17:03)  T(F): 97.5 (27 Jan 2020 06:07), Max: 98.5 (26 Jan 2020 17:03)  HR: 87 (27 Jan 2020 06:07) (71 - 87)  BP: 141/85 (27 Jan 2020 06:07) (117/60 - 142/64)  BP(mean): --  RR: 18 (27 Jan 2020 06:07) (18 - 18)  SpO2: 95% (27 Jan 2020 06:07) (94% - 96%)    Gen: NAD, awake/alert    Left Lower Extremity:  Dressing clean dry intact  +abd pillow in place  +EHL/FHL/TA/GS  SILT L3-S1  +DP/PT Pulses  Compartments soft  No calf TTP B/L

## 2020-01-28 ENCOUNTER — TRANSCRIPTION ENCOUNTER (OUTPATIENT)
Age: 85
End: 2020-01-28

## 2020-01-28 LAB
ANION GAP SERPL CALC-SCNC: 13 MMOL/L — SIGNIFICANT CHANGE UP (ref 5–17)
BUN SERPL-MCNC: 14 MG/DL — SIGNIFICANT CHANGE UP (ref 7–23)
CALCIUM SERPL-MCNC: 9.3 MG/DL — SIGNIFICANT CHANGE UP (ref 8.4–10.5)
CHLORIDE SERPL-SCNC: 101 MMOL/L — SIGNIFICANT CHANGE UP (ref 96–108)
CO2 SERPL-SCNC: 22 MMOL/L — SIGNIFICANT CHANGE UP (ref 22–31)
CREAT SERPL-MCNC: 0.73 MG/DL — SIGNIFICANT CHANGE UP (ref 0.5–1.3)
GLUCOSE SERPL-MCNC: 143 MG/DL — HIGH (ref 70–99)
HCT VFR BLD CALC: 37.3 % — SIGNIFICANT CHANGE UP (ref 34.5–45)
HGB BLD-MCNC: 12.2 G/DL — SIGNIFICANT CHANGE UP (ref 11.5–15.5)
INR BLD: 2.05 RATIO — HIGH (ref 0.88–1.16)
MCHC RBC-ENTMCNC: 30 PG — SIGNIFICANT CHANGE UP (ref 27–34)
MCHC RBC-ENTMCNC: 32.7 GM/DL — SIGNIFICANT CHANGE UP (ref 32–36)
MCV RBC AUTO: 91.6 FL — SIGNIFICANT CHANGE UP (ref 80–100)
PLATELET # BLD AUTO: 199 K/UL — SIGNIFICANT CHANGE UP (ref 150–400)
POTASSIUM SERPL-MCNC: 4.1 MMOL/L — SIGNIFICANT CHANGE UP (ref 3.5–5.3)
POTASSIUM SERPL-SCNC: 4.1 MMOL/L — SIGNIFICANT CHANGE UP (ref 3.5–5.3)
PROTHROM AB SERPL-ACNC: 23.7 SEC — HIGH (ref 10–13.1)
RBC # BLD: 4.07 M/UL — SIGNIFICANT CHANGE UP (ref 3.8–5.2)
RBC # FLD: 13.2 % — SIGNIFICANT CHANGE UP (ref 10.3–14.5)
SODIUM SERPL-SCNC: 136 MMOL/L — SIGNIFICANT CHANGE UP (ref 135–145)
WBC # BLD: 10.7 K/UL — HIGH (ref 3.8–10.5)
WBC # FLD AUTO: 10.7 K/UL — HIGH (ref 3.8–10.5)

## 2020-01-28 RX ORDER — WARFARIN SODIUM 2.5 MG/1
2 TABLET ORAL ONCE
Refills: 0 | Status: COMPLETED | OUTPATIENT
Start: 2020-01-28 | End: 2020-01-28

## 2020-01-28 RX ORDER — ENOXAPARIN SODIUM 100 MG/ML
40 INJECTION SUBCUTANEOUS
Qty: 0 | Refills: 0 | DISCHARGE
Start: 2020-01-28

## 2020-01-28 RX ADMIN — Medication 975 MILLIGRAM(S): at 11:01

## 2020-01-28 RX ADMIN — SENNA PLUS 2 TABLET(S): 8.6 TABLET ORAL at 23:12

## 2020-01-28 RX ADMIN — ATORVASTATIN CALCIUM 20 MILLIGRAM(S): 80 TABLET, FILM COATED ORAL at 23:12

## 2020-01-28 RX ADMIN — BISOPROLOL FUMARATE 2.5 MILLIGRAM(S): 10 TABLET, FILM COATED ORAL at 19:34

## 2020-01-28 RX ADMIN — MEMANTINE HYDROCHLORIDE 10 MILLIGRAM(S): 10 TABLET ORAL at 18:16

## 2020-01-28 RX ADMIN — Medication 1 TABLET(S): at 11:01

## 2020-01-28 RX ADMIN — WARFARIN SODIUM 2 MILLIGRAM(S): 2.5 TABLET ORAL at 23:12

## 2020-01-28 RX ADMIN — QUETIAPINE FUMARATE 200 MILLIGRAM(S): 200 TABLET, FILM COATED ORAL at 23:11

## 2020-01-28 RX ADMIN — MEMANTINE HYDROCHLORIDE 10 MILLIGRAM(S): 10 TABLET ORAL at 05:29

## 2020-01-28 RX ADMIN — Medication 975 MILLIGRAM(S): at 11:31

## 2020-01-28 RX ADMIN — DONEPEZIL HYDROCHLORIDE 10 MILLIGRAM(S): 10 TABLET, FILM COATED ORAL at 23:12

## 2020-01-28 RX ADMIN — BISOPROLOL FUMARATE 2.5 MILLIGRAM(S): 10 TABLET, FILM COATED ORAL at 05:29

## 2020-01-28 RX ADMIN — AMLODIPINE BESYLATE 5 MILLIGRAM(S): 2.5 TABLET ORAL at 05:29

## 2020-01-28 RX ADMIN — ENOXAPARIN SODIUM 40 MILLIGRAM(S): 100 INJECTION SUBCUTANEOUS at 05:30

## 2020-01-28 RX ADMIN — Medication 975 MILLIGRAM(S): at 18:17

## 2020-01-28 NOTE — PROGRESS NOTE ADULT - SUBJECTIVE AND OBJECTIVE BOX
83 y/o F PMHx of HTN, HLD, hypothyroidism, Afib on coumadin BIBEMS from the [facility] with daughter at bedside c/o fall. Pt states she was walking, tripped over her own feet and had mechanical fall, landed on L hip with pain and inability to ambulate after, denies head trauma or LOC. Pt denies preceding HA, dizziness, CP, or other sxs. Pt only c/o L hip pain in ED, denies back pain, neck pain, HA, CP, abd pain, other extremity pain. Pt would only like Tylenol for pain.  Patient s/p ORIOF of the left hip. Seen OOB in chair, pain is well controlled .    MEDICATIONS  (STANDING):  acetaminophen   Tablet .. 975 milliGRAM(s) Oral every 6 hours  amLODIPine   Tablet 5 milliGRAM(s) Oral daily  atorvastatin 20 milliGRAM(s) Oral at bedtime  bisoprolol   Tablet 2.5 milliGRAM(s) Oral <User Schedule>  calcium carbonate 1250 mG  + Vitamin D (OsCal 500 + D) 1 Tablet(s) Oral daily  donepezil 10 milliGRAM(s) Oral at bedtime  enoxaparin Injectable 40 milliGRAM(s) SubCutaneous every 24 hours  influenza   Vaccine 0.5 milliLiter(s) IntraMuscular once  memantine 10 milliGRAM(s) Oral two times a day  QUEtiapine 200 milliGRAM(s) Oral at bedtime  senna 2 Tablet(s) Oral at bedtime  sodium chloride 0.9%. 1000 milliLiter(s) (100 mL/Hr) IV Continuous <Continuous>    MEDICATIONS  (PRN):  benzocaine 15 mG/menthol 3.6 mG (Sugar-Free) Lozenge 1 Lozenge Oral four times a day PRN Sore Throat  bisacodyl Suppository 10 milliGRAM(s) Rectal daily PRN If no bowel movement  magnesium hydroxide Suspension 30 milliLiter(s) Oral daily PRN Constipation  melatonin 3 milliGRAM(s) Oral at bedtime PRN Insomnia  oxyCODONE    IR 2.5 milliGRAM(s) Oral every 4 hours PRN Moderate Pain (4 - 6)  oxyCODONE    IR 5 milliGRAM(s) Oral every 4 hours PRN Severe Pain (7 - 10)  traMADol 50 milliGRAM(s) Oral every 6 hours PRN Mild Pain (1 - 3)        Vital Signs Last 24 Hrs  T(C): 37.2 (28 Jan 2020 09:11), Max: 37.3 (27 Jan 2020 17:28)  T(F): 99 (28 Jan 2020 09:11), Max: 99.1 (27 Jan 2020 17:28)  HR: 99 (28 Jan 2020 09:11) (82 - 99)  BP: 125/74 (28 Jan 2020 09:11) (114/71 - 142/72)  BP(mean): --  RR: 18 (28 Jan 2020 09:11) (18 - 18)  SpO2: 93% (28 Jan 2020 09:11) (93% - 98%)    PHYSICAL EXAM:  GENERAL: NAD, well nourished and conversant  HEAD:  Atraumatic  EYES: EOM, PERRLA, conjunctiva pink and sclera white  ENT: No tonsillar erythema, exudates, or enlargement, moist mucous membranes, good dentition, no lesions  NECK: Supple, No JVD, normal thyroid, carotids with normal upstrokes and no bruits  CHEST/LUNG: Clear to auscultation bilaterally, No rales, rhonchi, wheezing, or rubs  HEART: Regular rate and rhythm, No murmurs, rubs, or gallops  ABDOMEN: Soft, nondistended, no masses, guarding, tenderness or rebound, bowel sounds present  EXTREMITIES:  (+) hemiarthroplasty left hip  LYMPH: No lymphadenopathy noted  SKIN: No rashes or lesions  NERVOUS SYSTEM:  Alert & Oriented X3, normal cognitive function. Motor Strength 5/5 right upper and right lower.  5/5 left upper and left lower extremities, DTRs 2+ intact and symmetric    LABS:       CBC Full  -  ( 28 Jan 2020 08:55 )  WBC Count : 10.70 K/uL  RBC Count : 4.07 M/uL  Hemoglobin : 12.2 g/dL  Hematocrit : 37.3 %  Platelet Count - Automated : 199 K/uL  Mean Cell Volume : 91.6 fl  Mean Cell Hemoglobin : 30.0 pg  Mean Cell Hemoglobin Concentration : 32.7 gm/dL  Auto Neutrophil # : x  Auto Lymphocyte # : x  Auto Monocyte # : x  Auto Eosinophil # : x  Auto Basophil # : x  Auto Neutrophil % : x  Auto Lymphocyte % : x  Auto Monocyte % : x  Auto Eosinophil % : x  Auto Basophil % : x    01-28    136  |  101  |  14  ----------------------------<  143<H>  4.1   |  22  |  0.73    Ca    9.3      28 Jan 2020 07:05        PT/INR - ( 28 Jan 2020 08:19 )   PT: 23.7 sec;   INR: 2.05 ratio 83 y/o F PMHx of HTN, HLD, hypothyroidism, Afib on coumadin BIBEMS from the [facility] with daughter at bedside c/o fall. Pt states she was walking, tripped over her own feet and had mechanical fall, landed on L hip with pain and inability to ambulate after, denies head trauma or LOC. Pt denies preceding HA, dizziness, CP, or other sxs. Pt only c/o L hip pain in ED, denies back pain, neck pain, HA, CP, abd pain, other extremity pain. Pt would only like Tylenol for pain.  Patient underwent a left hemiarthroplasty ORIF on 01/25/20.  Seen OOB in chair, pain is well controlled. Beginning to ambulate with physical therapy.    MEDICATIONS  (STANDING):  acetaminophen   Tablet .. 975 milliGRAM(s) Oral every 6 hours  amLODIPine   Tablet 5 milliGRAM(s) Oral daily  atorvastatin 20 milliGRAM(s) Oral at bedtime  bisoprolol   Tablet 2.5 milliGRAM(s) Oral <User Schedule>  calcium carbonate 1250 mG  + Vitamin D (OsCal 500 + D) 1 Tablet(s) Oral daily  donepezil 10 milliGRAM(s) Oral at bedtime  enoxaparin Injectable 40 milliGRAM(s) SubCutaneous every 24 hours  influenza   Vaccine 0.5 milliLiter(s) IntraMuscular once  memantine 10 milliGRAM(s) Oral two times a day  QUEtiapine 200 milliGRAM(s) Oral at bedtime  senna 2 Tablet(s) Oral at bedtime  sodium chloride 0.9%. 1000 milliLiter(s) (100 mL/Hr) IV Continuous <Continuous>    MEDICATIONS  (PRN):  benzocaine 15 mG/menthol 3.6 mG (Sugar-Free) Lozenge 1 Lozenge Oral four times a day PRN Sore Throat  bisacodyl Suppository 10 milliGRAM(s) Rectal daily PRN If no bowel movement  magnesium hydroxide Suspension 30 milliLiter(s) Oral daily PRN Constipation  melatonin 3 milliGRAM(s) Oral at bedtime PRN Insomnia  oxyCODONE    IR 2.5 milliGRAM(s) Oral every 4 hours PRN Moderate Pain (4 - 6)  oxyCODONE    IR 5 milliGRAM(s) Oral every 4 hours PRN Severe Pain (7 - 10)  traMADol 50 milliGRAM(s) Oral every 6 hours PRN Mild Pain (1 - 3)        Vital Signs Last 24 Hrs  T(C): 37.2 (28 Jan 2020 09:11), Max: 37.3 (27 Jan 2020 17:28)  T(F): 99 (28 Jan 2020 09:11), Max: 99.1 (27 Jan 2020 17:28)  HR: 99 (28 Jan 2020 09:11) (82 - 99)  BP: 125/74 (28 Jan 2020 09:11) (114/71 - 142/72)  BP(mean): --  RR: 18 (28 Jan 2020 09:11) (18 - 18)  SpO2: 93% (28 Jan 2020 09:11) (93% - 98%)    PHYSICAL EXAM:  GENERAL: NAD, well nourished and conversant  HEAD:  Atraumatic  EYES: EOM, PERRLA, conjunctiva pink and sclera white  ENT: No tonsillar erythema, exudates, or enlargement, moist mucous membranes, good dentition, no lesions  NECK: Supple, No JVD, normal thyroid, carotids with normal upstrokes and no bruits  CHEST/LUNG: Clear to auscultation bilaterally, No rales, rhonchi, wheezing, or rubs  HEART: Regular rate and rhythm, No murmurs, rubs, or gallops  ABDOMEN: Soft, nondistended, no masses, guarding, tenderness or rebound, bowel sounds present  EXTREMITIES:  (+) hemiarthroplasty left hip  LYMPH: No lymphadenopathy noted  SKIN: No rashes or lesions  NERVOUS SYSTEM:  Alert & Oriented X3, normal cognitive function. Motor Strength 5/5 right upper and right lower.  5/5 left upper and left lower extremities, DTRs 2+ intact and symmetric    LABS:       CBC Full  -  ( 28 Jan 2020 08:55 )  WBC Count : 10.70 K/uL  RBC Count : 4.07 M/uL  Hemoglobin : 12.2 g/dL  Hematocrit : 37.3 %  Platelet Count - Automated : 199 K/uL  Mean Cell Volume : 91.6 fl  Mean Cell Hemoglobin : 30.0 pg  Mean Cell Hemoglobin Concentration : 32.7 gm/dL  Auto Neutrophil # : x  Auto Lymphocyte # : x  Auto Monocyte # : x  Auto Eosinophil # : x  Auto Basophil # : x  Auto Neutrophil % : x  Auto Lymphocyte % : x  Auto Monocyte % : x  Auto Eosinophil % : x  Auto Basophil % : x    01-28    136  |  101  |  14  ----------------------------<  143<H>  4.1   |  22  |  0.73    Ca    9.3      28 Jan 2020 07:05        PT/INR - ( 28 Jan 2020 08:19 )   PT: 23.7 sec;   INR: 2.05 ratio

## 2020-01-28 NOTE — DISCHARGE NOTE NURSING/CASE MANAGEMENT/SOCIAL WORK - NSDCVIVACCINE_GEN_ALL_CORE_FT
Procedure(s): 
CYSTOSCOPY URETERAL STENT INSERTION LEFT, WITH LEFT RETROGRADE. 
 
general, total IV anesthesia Anesthesia Post Evaluation Multimodal analgesia: multimodal analgesia not used between 6 hours prior to anesthesia start to PACU discharge Patient location during evaluation: PACU Patient participation: complete - patient participated Level of consciousness: awake and alert Pain score: 3 Pain management: satisfactory to patient Airway patency: patent Anesthetic complications: no 
Cardiovascular status: hemodynamically stable and acceptable Respiratory status: acceptable Hydration status: acceptable Comments: Patient seen and evaluated; no concerns. Post anesthesia nausea and vomiting:  none Vitals Value Taken Time /57 3/20/2019  7:15 PM  
Temp 36.6 °C (97.8 °F) 3/20/2019  7:15 PM  
Pulse 61 3/20/2019  7:16 PM  
Resp 11 3/20/2019  7:16 PM  
SpO2 96 % 3/20/2019  7:16 PM  
Vitals shown include unvalidated device data.
No Vaccines Administered.

## 2020-01-28 NOTE — OCCUPATIONAL THERAPY INITIAL EVALUATION ADULT - PERTINENT HX OF CURRENT PROBLEM, REHAB EVAL
84yFemale c/o L hip pain s/p mechanical fall while she was walking at her facility([facility]). Patient denies head hit or LOC. Patient denies numbness or tingling in the LLE. Patient denies any other injuries. She was unable to ambulate after the fall.

## 2020-01-28 NOTE — DISCHARGE NOTE PROVIDER - HOSPITAL COURSE
This is a 84 year old Female with past medical history of HTN, HLD, hypothyroidism, and afib on Coumadin admitted to [facility] on 1/25/20 with a L hip fracture.  Patient evaluated and cleared by Medicine for operative procedure.  On 1/25, patient underwent an uncomplicated L hip hemiarthroplasty.  Evaluated and treated by PT, recommended for Subacute Rehab.  Patient was restarted on Coumadin and her INR returned to therepuatic levels. Patient should have posterior hip dislocation precautions. Remain of hospital stay unremarkable, and patient discharged to Subacute Rehab when bed available.

## 2020-01-28 NOTE — DISCHARGE NOTE PROVIDER - NSDCMRMEDTOKEN_GEN_ALL_CORE_FT
amLODIPine 5 mg oral tablet: 1 tab(s) orally once a day  atorvastatin 20 mg oral tablet: 1 tab(s) orally once a day  bisoprolol 5 mg oral tablet: 0.5 tab(s) orally 2 times a day  donepezil 10 mg oral tablet: 1 tab(s) orally once a day (at bedtime)  memantine 10 mg oral tablet: 1 tab(s) orally 2 times a day  QUEtiapine 200 mg oral tablet, extended release: 1 tab(s) orally once a day (in the evening)  warfarin 1 mg oral tablet: 1 tab(s) orally once a day  x2 days (12/18 and 12/19) MDD:1mg  warfarin 2 mg oral tablet: 1 tab(s) orally once a day. Start on 12/20. MDD:2mg amLODIPine 5 mg oral tablet: 1 tab(s) orally once a day  atorvastatin 20 mg oral tablet: 1 tab(s) orally once a day  bisoprolol 5 mg oral tablet: 0.5 tab(s) orally 2 times a day  donepezil 10 mg oral tablet: 1 tab(s) orally once a day (at bedtime)  enoxaparin: 40 milligram(s) intramuscular once a day  memantine 10 mg oral tablet: 1 tab(s) orally 2 times a day  QUEtiapine 200 mg oral tablet, extended release: 1 tab(s) orally once a day (in the evening)  warfarin 1 mg oral tablet: 1 tab(s) orally once a day  x2 days (12/18 and 12/19) MDD:1mg  warfarin 2 mg oral tablet: 1 tab(s) orally once a day. Start on 12/20. MDD:2mg acetaminophen 325 mg oral tablet: 3 tab(s) orally every 6 hours  amLODIPine 5 mg oral tablet: 1 tab(s) orally once a day  atorvastatin 20 mg oral tablet: 1 tab(s) orally once a day  bisoprolol 5 mg oral tablet: 0.5 tab(s) orally 2 times a day  donepezil 10 mg oral tablet: 1 tab(s) orally once a day (at bedtime)  memantine 10 mg oral tablet: 1 tab(s) orally 2 times a day  QUEtiapine 200 mg oral tablet, extended release: 1 tab(s) orally once a day (in the evening)  traMADol 50 mg oral tablet: 1 tab(s) orally every 6 hours, As needed, Mild Pain (1 - 3)  warfarin 1 mg oral tablet: 1 tab(s) orally once a day  x2 days (12/18 and 12/19) MDD:1mg  warfarin 2 mg oral tablet: 1 tab(s) orally once a day. Start on 12/20. MDD:2mg

## 2020-01-28 NOTE — DISCHARGE NOTE PROVIDER - NSDCFUADDINST_GEN_ALL_CORE_FT
Follow up with Dr. Snell within 10-14 days of discharge.  Patient should have posterior hip dislocations.  She is weight bearing as tolerated/  Patient can continue with Coumadin.  Dressing can be changed as needed.  She may shower with dressing in place.

## 2020-01-28 NOTE — DISCHARGE NOTE PROVIDER - CARE PROVIDER_API CALL
Damon Snell)  Orthopaedic Surgery  [address] [place] [address], [address]  [place] [address]  Phone: [phone]  Fax: [phone]  Follow Up Time:

## 2020-01-28 NOTE — PROGRESS NOTE ADULT - ASSESSMENT
A/P: 85 y/o F s/p left hip hemiarthroplasty POD3    -pain control  -PT  -Posterior precautions  -WBAT  -OOB  -DVT ppx  -dispo plan: [facility]

## 2020-01-28 NOTE — DISCHARGE NOTE NURSING/CASE MANAGEMENT/SOCIAL WORK - PATIENT PORTAL LINK FT
You can access the FollowMyHealth Patient Portal offered by [facility] by registering at the following website: [email or web address]. By joining [facility]’s FollowMyHealth portal, you will also be able to view your health information using other applications (apps) compatible with our system.

## 2020-01-29 VITALS
RESPIRATION RATE: 18 BRPM | SYSTOLIC BLOOD PRESSURE: 96 MMHG | HEART RATE: 92 BPM | TEMPERATURE: 98 F | DIASTOLIC BLOOD PRESSURE: 68 MMHG | OXYGEN SATURATION: 96 %

## 2020-01-29 LAB
INR BLD: 2.01 RATIO — HIGH (ref 0.88–1.16)
PROTHROM AB SERPL-ACNC: 23.4 SEC — HIGH (ref 10–13.1)

## 2020-01-29 PROCEDURE — 80048 BASIC METABOLIC PNL TOTAL CA: CPT

## 2020-01-29 PROCEDURE — 72192 CT PELVIS W/O DYE: CPT

## 2020-01-29 PROCEDURE — 99285 EMERGENCY DEPT VISIT HI MDM: CPT | Mod: 25

## 2020-01-29 PROCEDURE — 97166 OT EVAL MOD COMPLEX 45 MIN: CPT

## 2020-01-29 PROCEDURE — 96374 THER/PROPH/DIAG INJ IV PUSH: CPT

## 2020-01-29 PROCEDURE — 97530 THERAPEUTIC ACTIVITIES: CPT

## 2020-01-29 PROCEDURE — 97161 PT EVAL LOW COMPLEX 20 MIN: CPT

## 2020-01-29 PROCEDURE — 80053 COMPREHEN METABOLIC PANEL: CPT

## 2020-01-29 PROCEDURE — 85730 THROMBOPLASTIN TIME PARTIAL: CPT

## 2020-01-29 PROCEDURE — 97110 THERAPEUTIC EXERCISES: CPT

## 2020-01-29 PROCEDURE — 76377 3D RENDER W/INTRP POSTPROCES: CPT

## 2020-01-29 PROCEDURE — C1713: CPT

## 2020-01-29 PROCEDURE — 86900 BLOOD TYPING SEROLOGIC ABO: CPT

## 2020-01-29 PROCEDURE — 70450 CT HEAD/BRAIN W/O DYE: CPT

## 2020-01-29 PROCEDURE — 96375 TX/PRO/DX INJ NEW DRUG ADDON: CPT

## 2020-01-29 PROCEDURE — 85610 PROTHROMBIN TIME: CPT

## 2020-01-29 PROCEDURE — 72170 X-RAY EXAM OF PELVIS: CPT

## 2020-01-29 PROCEDURE — C1776: CPT

## 2020-01-29 PROCEDURE — 88311 DECALCIFY TISSUE: CPT

## 2020-01-29 PROCEDURE — 88305 TISSUE EXAM BY PATHOLOGIST: CPT

## 2020-01-29 PROCEDURE — 86901 BLOOD TYPING SEROLOGIC RH(D): CPT

## 2020-01-29 PROCEDURE — 85027 COMPLETE CBC AUTOMATED: CPT

## 2020-01-29 PROCEDURE — 73502 X-RAY EXAM HIP UNI 2-3 VIEWS: CPT

## 2020-01-29 PROCEDURE — 93005 ELECTROCARDIOGRAM TRACING: CPT

## 2020-01-29 PROCEDURE — 73552 X-RAY EXAM OF FEMUR 2/>: CPT

## 2020-01-29 PROCEDURE — 71045 X-RAY EXAM CHEST 1 VIEW: CPT

## 2020-01-29 PROCEDURE — 73562 X-RAY EXAM OF KNEE 3: CPT

## 2020-01-29 PROCEDURE — 86850 RBC ANTIBODY SCREEN: CPT

## 2020-01-29 PROCEDURE — C1889: CPT

## 2020-01-29 RX ORDER — TRAMADOL HYDROCHLORIDE 50 MG/1
1 TABLET ORAL
Qty: 0 | Refills: 0 | DISCHARGE
Start: 2020-01-29

## 2020-01-29 RX ORDER — ACETAMINOPHEN 500 MG
3 TABLET ORAL
Qty: 0 | Refills: 0 | DISCHARGE
Start: 2020-01-29

## 2020-01-29 RX ORDER — WARFARIN SODIUM 2.5 MG/1
2 TABLET ORAL ONCE
Refills: 0 | Status: DISCONTINUED | OUTPATIENT
Start: 2020-01-29 | End: 2020-01-29

## 2020-01-29 RX ADMIN — Medication 975 MILLIGRAM(S): at 13:24

## 2020-01-29 RX ADMIN — Medication 975 MILLIGRAM(S): at 05:58

## 2020-01-29 RX ADMIN — AMLODIPINE BESYLATE 5 MILLIGRAM(S): 2.5 TABLET ORAL at 05:58

## 2020-01-29 RX ADMIN — BISOPROLOL FUMARATE 2.5 MILLIGRAM(S): 10 TABLET, FILM COATED ORAL at 05:57

## 2020-01-29 RX ADMIN — Medication 1 TABLET(S): at 12:54

## 2020-01-29 RX ADMIN — Medication 975 MILLIGRAM(S): at 12:54

## 2020-01-29 RX ADMIN — Medication 975 MILLIGRAM(S): at 06:28

## 2020-01-29 RX ADMIN — MEMANTINE HYDROCHLORIDE 10 MILLIGRAM(S): 10 TABLET ORAL at 05:58

## 2020-01-29 NOTE — PROGRESS NOTE ADULT - SUBJECTIVE AND OBJECTIVE BOX
83 y/o F PMHx of HTN, HLD, hypothyroidism, Afib on coumadin BIBEMS from the [facility] with daughter at bedside c/o fall. Pt states she was walking, tripped over her own feet and had mechanical fall, landed on L hip with pain and inability to ambulate after, denies head trauma or LOC. Pt denies preceding HA, dizziness, CP, or other sxs. Pt only c/o L hip pain in ED, denies back pain, neck pain, HA, CP, abd pain, other extremity pain. Pt would only like Tylenol for pain.  Patient underwent a left hemiarthroplasty ORIF on 01/25/20.  Seen OOB in chair, pain is well controlled. Beginning to ambulate with physical therapy. awaiting DC to rehab      MEDICATIONS  (STANDING):  acetaminophen   Tablet .. 975 milliGRAM(s) Oral every 6 hours  amLODIPine   Tablet 5 milliGRAM(s) Oral daily  atorvastatin 20 milliGRAM(s) Oral at bedtime  bisoprolol   Tablet 2.5 milliGRAM(s) Oral <User Schedule>  calcium carbonate 1250 mG  + Vitamin D (OsCal 500 + D) 1 Tablet(s) Oral daily  donepezil 10 milliGRAM(s) Oral at bedtime  influenza   Vaccine 0.5 milliLiter(s) IntraMuscular once  memantine 10 milliGRAM(s) Oral two times a day  QUEtiapine 200 milliGRAM(s) Oral at bedtime  senna 2 Tablet(s) Oral at bedtime  sodium chloride 0.9%. 1000 milliLiter(s) (100 mL/Hr) IV Continuous <Continuous>  warfarin 2 milliGRAM(s) Oral once    MEDICATIONS  (PRN):  benzocaine 15 mG/menthol 3.6 mG (Sugar-Free) Lozenge 1 Lozenge Oral four times a day PRN Sore Throat  bisacodyl Suppository 10 milliGRAM(s) Rectal daily PRN If no bowel movement  magnesium hydroxide Suspension 30 milliLiter(s) Oral daily PRN Constipation  melatonin 3 milliGRAM(s) Oral at bedtime PRN Insomnia  oxyCODONE    IR 2.5 milliGRAM(s) Oral every 4 hours PRN Moderate Pain (4 - 6)  oxyCODONE    IR 5 milliGRAM(s) Oral every 4 hours PRN Severe Pain (7 - 10)  traMADol 50 milliGRAM(s) Oral every 6 hours PRN Mild Pain (1 - 3)          VITALS:   T(C): 36.9 (01-29-20 @ 13:41), Max: 37.3 (01-28-20 @ 21:14)  HR: 92 (01-29-20 @ 13:41) (89 - 107)  BP: 96/68 (01-29-20 @ 13:41) (96/68 - 143/86)  RR: 18 (01-29-20 @ 13:41) (18 - 18)  SpO2: 96% (01-29-20 @ 13:41) (94% - 96%)  Wt(kg): --      PHYSICAL EXAM:  GENERAL: NAD, well nourished and conversant  HEAD:  Atraumatic  EYES: EOM, PERRLA, conjunctiva pink and sclera white  ENT: No tonsillar erythema, exudates, or enlargement, moist mucous membranes, good dentition, no lesions  NECK: Supple, No JVD, normal thyroid, carotids with normal upstrokes and no bruits  CHEST/LUNG: Clear to auscultation bilaterally, No rales, rhonchi, wheezing, or rubs  HEART: Regular rate and rhythm, No murmurs, rubs, or gallops  ABDOMEN: Soft, nondistended, no masses, guarding, tenderness or rebound, bowel sounds present  EXTREMITIES:  (+) hemiarthroplasty left hip  LYMPH: No lymphadenopathy noted  SKIN: No rashes or lesions  NERVOUS SYSTEM:  Alert & Oriented X3, normal cognitive function. Motor Strength 5/5 right upper and right lower.  5/5 left upper and left lower extremities, DTRs 2+ intact and symmetric  LABS:        CBC Full  -  ( 28 Jan 2020 08:55 )  WBC Count : 10.70 K/uL  RBC Count : 4.07 M/uL  Hemoglobin : 12.2 g/dL  Hematocrit : 37.3 %  Platelet Count - Automated : 199 K/uL  Mean Cell Volume : 91.6 fl  Mean Cell Hemoglobin : 30.0 pg  Mean Cell Hemoglobin Concentration : 32.7 gm/dL  Auto Neutrophil # : x  Auto Lymphocyte # : x  Auto Monocyte # : x  Auto Eosinophil # : x  Auto Basophil # : x  Auto Neutrophil % : x  Auto Lymphocyte % : x  Auto Monocyte % : x  Auto Eosinophil % : x  Auto Basophil % : x    01-28    136  |  101  |  14  ----------------------------<  143<H>  4.1   |  22  |  0.73    Ca    9.3      28 Jan 2020 07:05        PT/INR - ( 29 Jan 2020 08:21 )   PT: 23.4 sec;   INR: 2.01 ratio             CAPILLARY BLOOD GLUCOSE          RADIOLOGY & ADDITIONAL TESTS:

## 2020-01-29 NOTE — PROGRESS NOTE ADULT - REASON FOR ADMISSION
Left Hip Fracture

## 2020-01-29 NOTE — PROGRESS NOTE ADULT - SUBJECTIVE AND OBJECTIVE BOX
Pt S/E at bedside, no acute events overnight, pain controlled. Resting comfortably this morning, no complaints.     Vital Signs Last 24 Hrs  T(C): 36.7 (29 Jan 2020 05:45), Max: 37.4 (28 Jan 2020 13:31)  T(F): 98.1 (29 Jan 2020 05:45), Max: 99.3 (28 Jan 2020 13:31)  HR: 94 (29 Jan 2020 05:45) (89 - 107)  BP: 118/73 (29 Jan 2020 05:45) (103/63 - 143/86)  BP(mean): --  RR: 18 (29 Jan 2020 05:45) (18 - 18)  SpO2: 94% (29 Jan 2020 05:45) (92% - 96%)    Gen: NAD, awake/alert    Left Lower Extremity:  Dressing clean dry intact  +abd pillow  +EHL/FHL/TA/GS  SILT L3-S1  +DP/PT Pulses  Compartments soft  No calf TTP B/L

## 2020-01-29 NOTE — PROGRESS NOTE ADULT - ATTENDING COMMENTS
Patient underwent a left hemiarthroplasty ORIF on 01/25/20.  Seen OOB in chair, pain is well controlled. Beginning to ambulate with physical therapy.  No medical complications post-op to date and to proceed with physical therapy, as tolerated. Continues pulmonary toilet to lessen atelectasis, leg exercises as taught to lessen the risk of DVT and supervised pain medications for post-op pain control.
Patient underwent a left hemiarthroplasty ORIF on 01/25/20.  Seen OOB in chair, pain is well controlled. Beginning to ambulate with physical therapy.  No medical complications post-op to date and to proceed with physical therapy, as tolerated. Continues pulmonary toilet to lessen atelectasis, leg exercises as taught to lessen the risk of DVT and supervised pain medications for post-op pain control.

## 2020-01-29 NOTE — PROGRESS NOTE ADULT - ASSESSMENT
84F s/p L hip hemiarthroplasty    Analgesia  DVT ppx  WBAT LLE with posterior hip precautions  PT/OT  Incentive spirometry  DC planning to rehab

## 2020-01-31 LAB — SURGICAL PATHOLOGY STUDY: SIGNIFICANT CHANGE UP

## 2020-02-11 LAB — INR PPP: 2.44

## 2020-02-12 ENCOUNTER — RX RENEWAL (OUTPATIENT)
Age: 85
End: 2020-02-12

## 2020-03-04 ENCOUNTER — RX RENEWAL (OUTPATIENT)
Age: 85
End: 2020-03-04

## 2020-03-04 RX ORDER — OMEPRAZOLE 40 MG/1
40 CAPSULE, DELAYED RELEASE ORAL TWICE DAILY
Qty: 60 | Refills: 0 | Status: ACTIVE | COMMUNITY
Start: 2019-06-27 | End: 1900-01-01

## 2020-03-06 LAB — INR PPP: 3

## 2020-04-06 LAB — INR PPP: 2

## 2020-06-21 NOTE — ED PROVIDER NOTE - HEME LYMPH, MLM
Problem: Patient Care Overview (Adult)  Goal: Plan of Care Review  Outcome: Ongoing (interventions implemented as appropriate)    02/18/17 0631   Coping/Psychosocial Response Interventions   Plan Of Care Reviewed With patient;spouse   Patient Care Overview   Progress improving   Outcome Evaluation   Outcome Summary/Follow up Plan No c/o pain overnight. Slept well between care.       Goal: Adult Individualization and Mutuality  Outcome: Ongoing (interventions implemented as appropriate)    02/18/17 0631   Mutuality/Individual Preferences   What Questions Do You Have About Your Health or Care? What time will I be discharged?   Individualization   Patient Specific Goals To go home today.       Goal: Discharge Needs Assessment  Outcome: Ongoing (interventions implemented as appropriate)    02/18/17 0631   Discharge Needs Assessment   Concerns To Be Addressed no discharge needs identified   Readmission Within The Last 30 Days no previous admission in last 30 days   Equipment Needed After Discharge none   Current Health   Anticipated Changes Related to Illness none   Living Environment   Transportation Available car         Problem: Acute Coronary Syndrome (ACS) (Adult)  Goal: Signs and Symptoms of Listed Potential Problems Will be Absent or Manageable (Acute Coronary Syndrome)  Outcome: Ongoing (interventions implemented as appropriate)    02/18/17 0631   Acute Coronary Syndrome (ACS)   Problems Assessed (Acute Coronary Syndrome (ACS)) all   Problems Present (Acute Coronary Syndrome (ACS)) none            No adenopathy or splenomegaly. No cervical or inguinal lymphadenopathy.

## 2020-06-21 NOTE — ED PROVIDER NOTE - CLINICAL SUMMARY MEDICAL DECISION MAKING FREE TEXT BOX
85 y/o F PMH dementia HTN, HLD, afib on coumadin, hypothyroidism presents from home with daughter for weightloss over last 2 weeks associated with decreased PO intake, generalized weakness. Denies f/c, cp, sob, cough. Pt was in lockdown at [facility] until 10 days ago, daughter had not seen pt in 2 months, reports she was very deconditioned, took her home for the weekend and pt has not been eating. Denies dark, tarry stools, BRBPR, hematuria, problems urinating, n/v/d. DDx UTI, pneumonia, covid, ACS. plan labs ekg ua urine culture.

## 2020-06-21 NOTE — ED PROVIDER NOTE - OBJECTIVE STATEMENT
83 y/o F PMH dementia HTN, HLD, afib on coumadin, hypothyroidism presents from home with daughter for weightloss over last 2 weeks associated with decreased PO intake, generalized weakness. Denies f/c, cp, sob, cough. Pt was in lockdown at [facility] until 10 days ago, daughter had not seen pt in 2 months, reports she was very deconditioned, took her home for the weekend and pt has not been eating. Denies dark, tarry stools, BRBPR, hematuria, problems urinating, n/v/d. 83 y/o F PMH dementia HTN, HLD, afib on coumadin, hypothyroidism presents from home with daughter for weightloss over last 2 weeks associated with decreased PO intake, generalized weakness. Denies f/c, cp, sob, cough. Pt was in lockdown at [facility] until 10 days ago, daughter had not seen pt in 2 months, reports she was very deconditioned, took her home for the weekend and pt has not been eating. Denies dark, tarry stools, BRBPR, hematuria, problems urinating, n/v/d.    Attendinyo female presents with weakness and decreased appetite for the past 2 weeks.  pt denies pain.  no change in mental status.

## 2020-06-21 NOTE — ED ADULT TRIAGE NOTE - CHIEF COMPLAINT QUOTE
Pt has had reduced appetite and weakness, 20Lb loss in a few weeks. Pt from the [facility] and daughter was unable to visit with patient until this weekend.

## 2020-06-21 NOTE — ED ADULT NURSE NOTE - OBJECTIVE STATEMENT
84 year old female brought in by daughter for weight loss concern over the past 3 months.  Pt resides at the [facility] assisted [facility] and has been quarantined form the family  up till 2 weeks ago was aloud to visit.   Pt has dementia and has short term memory Pt does not remember if she ate and needs encouragement As per daughter pt has 20 pound weight loss since she saw her last.  Pt appears well looking but as per daughter pt has generalized weakness and has right hip and back pain.  pt states she feels fine.  IVL placed and bloods sent and pt straight cath for urine and swabbed for COVID.

## 2020-06-21 NOTE — ED ADULT NURSE NOTE - NSIMPLEMENTINTERV_GEN_ALL_ED
Implemented All Fall Risk Interventions:  [place] to call system. Call bell, personal items and telephone within reach. Instruct patient to call for assistance. Room bathroom lighting operational. Non-slip footwear when patient is off stretcher. Physically safe environment: no spills, clutter or unnecessary equipment. Stretcher in lowest position, wheels locked, appropriate side rails in place. Provide visual cue, wrist band, yellow gown, etc. Monitor gait and stability. Monitor for mental status changes and reorient to person, place, and time. Review medications for side effects contributing to fall risk. Reinforce activity limits and safety measures with patient and family.

## 2020-06-21 NOTE — ED PROVIDER NOTE - NSFOLLOWUPINSTRUCTIONS_ED_ALL_ED_FT
Please follow up with your PCP within the next 7-10 days, continue to take your home medications as prescribed.     Please continue to eat and drink to keep yourself nourished and hydrated.     Please return to the ER for new or worsening weakness or if you develop any new symptoms.

## 2020-06-21 NOTE — ED ADULT NURSE REASSESSMENT NOTE - NS ED NURSE REASSESS COMMENT FT1
Report taken from IMER Mace at 1900. VS as documented. Pt AAOx2 (oriented to self and place) which is baseline per family. Pt complaining of fatigue. Pt denies pain at this time. Pt aware of plan to await lab results. Pt cont on cardiac and spO2 monitor. Pt daughter at bedside.

## 2020-06-21 NOTE — ED ADULT NURSE NOTE - CHIEF COMPLAINT QUOTE
Pt has had reduced appetite and weakness, 20Lb loss in a few weeks. Pt from the [place] and daughter was unable to visit with patient until this weekend.

## 2020-06-21 NOTE — ED PROVIDER NOTE - PATIENT PORTAL LINK FT
You can access the FollowMyHealth Patient Portal offered by [facility] by registering at the following website: [email or web address]. By joining [employer]’s FollowMyHealth portal, you will also be able to view your health information using other applications (apps) compatible with our system.

## 2020-06-21 NOTE — ED PROVIDER NOTE - MDM ORDERS SUBMITTED SELECTION
44 y/o female patient presents ambulatory to the ED with complaint of facial injury/pain. Patient states she was reaching up into a cabinet and a metal can fell and hit her in the nasal bridge around 10am today. Per patient she did not fall, did not hit her head, no LOC. No bleeding noted at the site upon arrival to ED- patient states +bleeding at time of injury. Patient denies SOB and CP, no N/V/D, afebrile in ED, no headache, no lightheadedness/dizziness, no weakness, no numbness or tingling, no abdominal pain or tenderness, no syncope, no cough, no URI
Labs/EKG

## 2020-06-22 PROBLEM — Z79.01 LONG TERM CURRENT USE OF ANTICOAGULANT THERAPY: Status: ACTIVE | Noted: 2020-01-01

## 2020-06-22 PROBLEM — Z51.81 ANTICOAGULATION GOAL OF INR 2 TO 3: Status: ACTIVE | Noted: 2020-01-01

## 2020-07-07 NOTE — ED PROVIDER NOTE - ENMT, MLM
Airway patent, Nasal mucosa clear. Mouth with normal mucosa. Throat has no vesicles, no oropharyngeal exudates and uvula is midline. Complex Repair And Flap Additional Text (Will Appearing After The Standard Complex Repair Text): The complex repair was not sufficient to completely close the primary defect. The remaining additional defect was repaired with the flap mentioned below.

## 2020-08-05 NOTE — PHYSICAL THERAPY INITIAL EVALUATION ADULT - SITTING BALANCE: STATIC
[FreeTextEntry1] : Right lung adenocarcinoma\par Stage IV (pleural effusion, celiac lymph node)\par MRI joyce without brain metastases\par Ho renal transplant \par Hepatitic C - started on epclusa  7/18/20\par Foundation 1- No mutations with approved treatment for lung cancer\par TMB 15 mut/mb -> ?? possibly predicts response to immunotherapy\par \par Here for C1 carboplatin alimta keytruda (8/5/20)\par B12 today\par He takes folic acid\par Again discussed about the immunosuppressive regimen and risk of transplant rejection. He is agreeable for the immunosuppressive therapy which will now contain low-dose tacro, dosed to achieve a trough level of 2-5 ng/mL + low-dose pred (5mg po daily) (per discussion with Dr Tj Vasquez (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 he initially declined, now agreeable\par \par Family ho pancreatic cancer\par Genetic testing negative\par \par Follow up in 3 weeks for C2\par CBC, CMP, CEA, \par \par 
good balance

## 2020-09-01 NOTE — ED PROVIDER NOTE - ATTENDING CONTRIBUTION TO CARE
attending Steve: 84yF h/o dementia, HTN, HLD, schizophrenia, A fib on coumadin, presenting from home w/ daughter at bedside for increased confusion and agitation. Pt resides at assisted living, reportedly with increased agitation x several days, daughter brought patient to her home in hopes to improve her behavior without improvement. Started on zoloft 2 weeks ago. Delirium vs worsening dementia. Will obtain labs, UA, CTH eval delirium, will require admission

## 2020-09-01 NOTE — ED PROVIDER NOTE - CLINICAL SUMMARY MEDICAL DECISION MAKING FREE TEXT BOX
Pt with history of dementia, schizophrenia, addition of new SSRI two weeks ago p/f delirium, agitation, ams. Could be 2/2 new med, could also be due to infxn / uti. Will r/o IC pathology / bleed with CTH. Labs, urine, ekg, cxr, likely admit given pt is on low supervision plan at the [facility].

## 2020-09-01 NOTE — ED ADULT NURSE NOTE - CHIEF COMPLAINT QUOTE
as per pt's daughter "increased agitation/dementia while having her home for a visit from the [place]" pt agitated/yelling in triage

## 2020-09-01 NOTE — ED ADULT TRIAGE NOTE - CHIEF COMPLAINT QUOTE
as per pt's daughter "increased agitation/dementia while having her home for a visit from the [place]" pt agitated/yelling in triage

## 2020-09-01 NOTE — ED ADULT NURSE NOTE - NSIMPLEMENTINTERV_GEN_ALL_ED
Implemented All Fall with Harm Risk Interventions:  [place] to call system. Call bell, personal items and telephone within reach. Instruct patient to call for assistance. Room bathroom lighting operational. Non-slip footwear when patient is off stretcher. Physically safe environment: no spills, clutter or unnecessary equipment. Stretcher in lowest position, wheels locked, appropriate side rails in place. Provide visual cue, wrist band, yellow gown, etc. Monitor gait and stability. Monitor for mental status changes and reorient to person, place, and time. Review medications for side effects contributing to fall risk. Reinforce activity limits and safety measures with patient and family. Provide visual clues: red socks.

## 2020-09-01 NOTE — ED PROVIDER NOTE - OBJECTIVE STATEMENT
84F hx dementia, htn, hld, schizophrenia, presenting from home w/ daughter at bedside for increased confusion and agitation. Pt is an assisted living pt at the [facility], daughter received a call yesterday from staff that pt was more agitated than usually and suggested that daughter pic her up in hopes that some "distraction" might improve the situation. Daughter has been with the pt the last ~24hrs and says pt's behavior has only worsened. Per daughter, pt is yelling about the whereabouts of her dogs that have been dead for the last 20 years, for example. Daughter notes that pt was put on Zoloft ~2 wks ago by [facility] doctor and wonders if this has caused her schizophrenia to "flair up". Per daughter pt's other psych meds include seroquel and nemenda. Daughter has not obsevred / heard pt complain of cp/sob, n/v, abdominal pain, dysuria/increased frequency.

## 2020-09-02 NOTE — BEHAVIORAL HEALTH ASSESSMENT NOTE - NSBHCHARTREVIEWVS_PSY_A_CORE FT
Vital Signs Last 24 Hrs  T(C): 36.6 (02 Sep 2020 10:21), Max: 36.7 (02 Sep 2020 04:00)  T(F): 97.9 (02 Sep 2020 10:21), Max: 98 (02 Sep 2020 04:00)  HR: 79 (02 Sep 2020 10:21) (78 - 127)  BP: 105/70 (02 Sep 2020 10:21) (105/70 - 133/56)  BP(mean): 77 (02 Sep 2020 07:14) (77 - 77)  RR: 20 (02 Sep 2020 10:21) (20 - 20)  SpO2: 100% (02 Sep 2020 10:21) (97% - 100%)

## 2020-09-02 NOTE — ED ADULT NURSE REASSESSMENT NOTE - NS ED NURSE REASSESS COMMENT FT1
patient placed on constant observation for  abnormal mental status, at 0744. Patient maintained in a safe environment.

## 2020-09-02 NOTE — BEHAVIORAL HEALTH ASSESSMENT NOTE - COMMENTS ON SUICIDE RISK/PROTECTIVE FACTORS:
Patient's disorientation makes it difficult to assess her risk; she denies current SI and has no history of SA.

## 2020-09-02 NOTE — H&P ADULT - HISTORY OF PRESENT ILLNESS
84F hx dementia, htn, hld, schizophrenia, presenting from home w/ daughter at bedside for increased confusion and agitation. Pt is an assisted living pt at the [facility], daughter received a call yesterday from staff that pt was more agitated than usually and suggested that daughter pic her up in hopes that some "distraction" might improve the situation. Daughter has been with the pt the last ~24hrs and says pt's behavior has only worsened. Per daughter, pt is yelling about the whereabouts of her dogs that have been dead for the last 20 years, for example. Daughter notes that pt was put on Zoloft ~2 wks ago by [facility] doctor and wonders if this has caused her schizophrenia to "flair up". Per daughter pt's other psych meds include seroquel and nemenda. Daughter has not obsevred / heard pt complain of cp/sob, n/v, abdominal pain, dysuria/increased frequency.

## 2020-09-02 NOTE — BEHAVIORAL HEALTH ASSESSMENT NOTE - SUICIDE PROTECTIVE FACTORS
After Visit Summary   4/9/2018    Madhav Esparza    MRN: [id number]           Patient Information     Date Of Birth          2003        Visit Information        Provider Department      4/9/2018 5:30 PM Heather Lion APRN CNP [facility]        Today's Diagnoses     Throat pain    -  1    Bilateral acute serous otitis media, recurrence not specified        Tonsillitis          Care Instructions      Tonsillitis (Child)  Tonsillitis is an inflammation or infection of your child's tonsils. Your child has two tonsils, one on either side of his or her throat. The tonsils are large, oval glands. They help prevent infections. But tonsils can become infected themselves. Tonsillitis is a common childhood condition.    Tonsillitis can be caused by bacteria or a virus. The main symptom is a sore throat. Your child may also have a fever, throat redness or swelling, or trouble swallowing. The tonsils may also look white, gray, or yellow.  If your child has a bacterial infection, antibiotics may be prescribed. Antibiotics don t work against viral infections. In some cases of a viral infection, an antiviral medication may be prescribed. Once the problem has been treated, your child may need surgery to remove the tonsils if they become infected often or cause breathing problems.  Home care  If your child s health care provider has prescribed antibiotics or another medication, give it to your child as directed. Be sure your child finishes all of the medication, even if he or she feels better.  To help ease your child s sore throat:    Give acetaminophen or ibuprofen. Follow the package instructions for giving these to a child. (Do not give aspirin to anyone younger than 18 years old who is ill with a fever. It may cause severe liver damage.)    Offer cool liquids to drink.    Have your child gargle with warm salt water. An over-the-counter throat-numbing spray may also  help.  The germs that cause tonsillitis are very contagious. To help prevent their spread, follow these tips:    Teach your child to wash his or her hands frequently.    Don t let your child share cups or utensils with other people.    Keep your child away from other children until he or she is better.  Follow-up care  Follow up with your child's health care provider, or as advised.  When to seek medical advice  Unless advised otherwise, call your child's healthcare provider if:    Your child is 3 months old or younger and has a fever of 100.4 F (38 C) or higher. Your child may need to see a healthcare provider.    Your child is of any age and has fevers higher than 104 F (40 C) that come back again and again.  Also call if any of the following occur:    Child has a sore throat for more than 2 days    Child has a sore throat with fever, headache, stomachache, or rash    Child has neck pain    Child has a seizure    Child is acting strangely    Child has trouble swallowing or breathing    Child can t open his or her mouth fully  Date Last Reviewed: 3/22/2015    9146-5053 [employer]. [address] [place] [address]. All rights reserved. This information is not intended as a substitute for professional medical care. Always follow your healthcare professional's instructions.        Earache Without Infection (Child)    Earaches can happen without an infection. This can occur when air and fluid build up behind the eardrum, causing pain and reduced hearing. This is called serous otitis media. It means fluid in the middle ear. It can happen when your child has a cold and congestion blocks the passage that drains the middle ear (eustachian tube). It may also occur with nasal allergies or gastroesophageal reflux (GERD), or after a bacterial middle ear infection. The earache may come and go. Your child may also hear clicking or popping sounds when chewing or swallowing.  It often takes several weeks to  3 months for the fluid to clear on its own. Oral pain relievers and ear drops help with pain. Decongestants and antihistamines can be used, but they don t always help. No infection is present, so antibiotics will not help. This condition can sometimes become an ear infection, so let the healthcare provider know if your child develops a fever or drainage from the ear or if symptoms get worse.  If your child doesn't get better after 3 months, surgery to drain the fluid and insertion of ear tubes may be recommended.  Home care  Follow these guidelines when caring for your child at home:    Fluids. For children younger than 1 year, keep giving regular formula feedings or breastfeeding. If your baby has a fever, give oral rehydration solution between feedings. (You can buy this at [employer] or [employer]. You don t need a prescription for this.) For children older than 1 year, give plenty of fluids like water, juice, noncaffeinated soft drinks, lemonade, fruit drinks, or popsicles.    Food. If your child doesn't want to eat solid foods, it's OK for a few days. But makes sure your child drinks plenty of fluid.    Pain or fever. Use acetaminophen for fever, fussiness, or discomfort. In infants older than 6 months, you may use ibuprofen instead of or alternated with acetaminophen. If your child has chronic liver or kidney disease, talk with your child s provider before using these medicines. Also talk with the provider if your child has had a stomach ulcer or GI bleeding. Don t give aspirin to a child under 18 years old who is ill with a fever. It may cause severe liver damage.    Eardrops. The provider may prescribe eardrops for pain. Use these as directed. Talk with the provider if eardrops were not prescribed and ibuprofen is not controlling the pain.  Follow-up care  Follow up with your child s health care provider if your child isn t feeling better after 3 days, or as directed.  When to seek medical advice  Unless  advised otherwise, call your child's healthcare provider if:    Your child is 3 months old or younger and has a fever of 100.4 F (38 C) or higher. Your child may need to see a healthcare provider.    Your child is of any age and has fevers higher than 104 F (40 C) that come back again and again.  Call your child's healthcare provider for any of the following:    Ear pain that gets worse or doesn t start to get better after 3 days of treatment    Discharge, blood, or foul odor from ear    Unusual decreased activity, fussiness, drowsiness, or confusion    Headache, neck pain, or stiff neck    New rash    Frequent diarrhea or vomiting    Fluid or blood draining from the ear    Convulsion (seizure)   Date Last Reviewed: 5/3/2015    7208-0535 [employer]. [address] [place] [address]. All rights reserved. This information is not intended as a substitute for professional medical care. Always follow your healthcare professional's instructions.                Follow-ups after your visit        Who to contact     If you have questions or need follow up information about today's clinic visit or your schedule please contact [facility] directly at [phone].  Normal or non-critical lab and imaging results will be communicated to you by Ter[employer], letter or phone within 4 business days after the clinic has received the results. If you do not hear from us within 7 days, please contact the clinic through [employer] or phone. If you have a critical or abnormal lab result, we will notify you by phone as soon as possible.  Submit refill requests through [employer] or call your pharmacy and they will forward the refill request to us. Please allow 3 business days for your refill to be completed.          Additional Information About Your Visit        [employer]io[employer]ability Information     [employer] lets you send messages to your doctor, view your test results, renew your prescriptions, schedule  appointments and more. To sign up, go to [email or web address], contact your [facility] clinic or call [phone] during business hours.            Care EveryWhere ID     This is your Care EveryWhere ID. This could be used by other organizations to access your [facility] medical records  [id number]        Your Vitals Were     Pulse Temperature Pulse Oximetry             116 97.8  F (36.6  C) (Tympanic) 99%          Blood Pressure from Last 3 Encounters:   04/09/18 144/83   03/02/18 144/85   02/20/18 146/83    Weight from Last 3 Encounters:   04/09/18 147 lb (66.7 kg) (96 %)*   03/02/18 139 lb 3.2 oz (63.1 kg) (95 %)*   02/20/18 144 lb (65.3 kg) (96 %)*     * Growth percentiles are based on [facility] 2-20 Years data.              We Performed the Following     Rapid strep screen          Today's Medication Changes          These changes are accurate as of 4/9/18  5:48 PM.  If you have any questions, ask your nurse or doctor.               Start taking these medicines.        Dose/Directions    amoxicillin 250 MG/5ML suspension   Commonly known as:  AMOXIL   Used for:  Tonsillitis   Started by:  Heather Lion APRN CNP        Dose:  500 mg   Take 10 mLs (500 mg) by mouth 2 times daily for 10 days   Quantity:  200 mL   Refills:  0       fluticasone 50 MCG/ACT spray   Commonly known as:  FLONASE   Used for:  Bilateral acute serous otitis media, recurrence not specified   Started by:  Heather Lion APRN CNP        Dose:  1 spray   Spray 1 spray into both nostrils daily   Quantity:  16 g   Refills:  0            Where to get your medicines      These medications were sent to [facility] [address] [address]     Phone:  [phone]     amoxicillin 250 MG/5ML suspension    fluticasone 50 MCG/ACT spray                Primary Care Provider Office Phone # Fax #    Amy Lucas PA-C [phone] [address] [place] [address]  [facility] [address]        Equal Access to Services     [facility] PHILLIP : Hadii aad ku hadnoniamalia Amitaali, waurbanoda luqadaha, qaybta kaalmada gilma, marcia marin. So [facility] [phone].    ATENCIÓN: Si habla español, tiene a dunbar disposición servicios gratuitos de asistencia lingüística. Brodie al [phone].    We comply with applicable federal civil rights laws and [place] laws. We do not discriminate on the basis of race, color, national origin, age, disability, sex, sexual orientation, or gender identity.            Thank you!     Thank you for choosing [facility]  for your care. Our goal is always to provide you with excellent care. Hearing back from our patients is one way we can continue to improve our services. Please take a few minutes to complete the written survey that you may receive in the mail after your visit with us. Thank you!             Your Updated Medication List - Protect others around you: Learn how to safely use, store and throw away your medicines at www.disposemymeds.org.          This list is accurate as of 4/9/18  5:48 PM.  Always use your most recent med list.                   Brand Name Dispense Instructions for use Diagnosis    albuterol 108 (90 BASE) MCG/ACT Inhaler    PROAIR HFA/PROVENTIL HFA/VENTOLIN HFA    1 Inhaler    Inhale 2 puffs into the lungs every 6 hours as needed for shortness of breath / dyspnea or wheezing    Acute bronchitis with coexisting condition requiring prophylactic treatment       amoxicillin 250 MG/5ML suspension    AMOXIL    200 mL    Take 10 mLs (500 mg) by mouth 2 times daily for 10 days    Tonsillitis       cetirizine 5 MG Chew    zyrTEC     Take 5 mg by mouth daily        CHILDRENS MULTIVITAMINS PO      Take 1 chew tab by mouth daily        fluticasone 50 MCG/ACT spray    FLONASE    16 g    Spray 1 spray into both nostrils daily    Bilateral acute serous otitis media, recurrence not specified        HYDROcodone-acetaminophen 5-325 MG per tablet    NORCO    10 tablet    Take 1 tablet by mouth every 4 hours as needed for moderate to severe pain        MAGNESIUM OXIDE PO           VITAMIN C PO      Take 500 mg by mouth daily           Orthodoxy beliefs/Other/Supportive social network of family or friends

## 2020-09-02 NOTE — BEHAVIORAL HEALTH ASSESSMENT NOTE - NSBHCHARTREVIEWLAB_PSY_A_CORE FT
13.1   12.52 )-----------( 228      ( 01 Sep 2020 23:43 )             40.8     141  |  104  |  21  ----------------------------<  142<H>  4.4   |  23  |  0.96    Ca    10.7<H>      01 Sep 2020 23:43    TPro  7.2  /  Alb  4.2  /  TBili  0.3  /  DBili  x   /  AST  39  /  ALT  22  /  AlkPhos  156<H>  09-01
I have personally performed a face to face diagnostic evaluation on this patient. I have reviewed the ACP note and agree with the history, exam and plan of care, except as noted.

## 2020-09-02 NOTE — H&P ADULT - NSHPLABSRESULTS_GEN_ALL_CORE
13.1   12.52 )-----------( 228      ( 01 Sep 2020 23:43 )             40.8           141  |  104  |  21  ----------------------------<  142<H>  4.4   |  23  |  0.96    Ca    10.7<H>      01 Sep 2020 23:43    TPro  7.2  /  Alb  4.2  /  TBili  0.3  /  DBili  x   /  AST  39  /  ALT  22  /  AlkPhos  156<H>                Urinalysis Basic - ( 01 Sep 2020 23:26 )    Color: Yellow / Appearance: Slightly Turbid / S.033 / pH: x  Gluc: x / Ketone: Trace  / Bili: Negative / Urobili: 3 mg/dL   Blood: x / Protein: 30 mg/dL / Nitrite: Negative   Leuk Esterase: Large / RBC: 3 /hpf /  /HPF   Sq Epi: x / Non Sq Epi: 1 /hpf / Bacteria: Negative        PT/INR - ( 01 Sep 2020 23:43 )   PT: 23.7 sec;   INR: 2.06 ratio         PTT - ( 01 Sep 2020 23:43 )  PTT:39.2 sec    Lactate Trend            CAPILLARY BLOOD GLUCOSE      POCT Blood Glucose.: 146 mg/dL (01 Sep 2020 23:28)

## 2020-09-02 NOTE — ED ADULT NURSE REASSESSMENT NOTE - NS ED NURSE REASSESS COMMENT FT1
Report received from Markel PORTER at 0700 Pt AAOx2, oriented to name and place, disoriented to date. NAD, resp nonlabored, skin warm/dry, resting comfortably in bed. Pt c/o "little" back pain  Pt denies headache, dizziness, chest pain, palpitations, SOB, abd pain, n/v/d, urinary symptoms, fevers, chills, weakness at this time. Pt awaiting for bed Safety maintained with call bell within reach.

## 2020-09-02 NOTE — H&P ADULT - PROBLEM SELECTOR PLAN 1
will start a course of ceftriaxone and follow cultures  will adjust meds as needed  encourage PO fluids

## 2020-09-02 NOTE — H&P ADULT - ASSESSMENT
83 yo woman with a hx of dementia and schizophrenia presents with a hx of worsening change in mental status. Patient found to have a Urinary tract infection.

## 2020-09-02 NOTE — H&P ADULT - NSHPREVIEWOFSYSTEMS_GEN_ALL_CORE
REVIEW OF SYSTEMS:  CONSTITUTIONAL: No fever, change in weight, or fatigue  HEAD: No headache, dizziness or recent trauma  EYES: No eye pain, visual disturbances, or discharge  ENT:  No difficulty hearing, tinnitus, vertigo, No sinus or throat pain  NECK: No pain or stiffness  BREASTS: No pain, masses, or nipple discharge  RESPIRATORY: No cough, wheezing, chills or hemoptysis, No shortness of breath at rest or exertional shortness of breath  CARDIOVASCULAR: No chest pain, palpitations, dizziness, CHF, arrhythmia, cardiomegaly or leg swelling  GASTROINTESTINAL: No abdominal or epigastric pain. No nausea, vomiting, or hematemesis, No diarrhea or constipation. No melena or hematochezia.  GENITOURINARY: No dysuria, frequency, hematuria, or incontinence  SKIN: No itching, burning, rashes, or lesions   LYMPH NODES: No history of enlarged glands  ENDOCRINE: No heat or cold intolerance, No hair loss. No osteoporosis or thyroid disease  MUSCULOSKELETAL: No joint pain or swelling, No muscle, back, or extremity pain  PSYCHIATRIC: No depression, anxiety, mood swings, or difficulty sleeping  HEME/LYMPH: No easy bruising, anticoagulants, bleeding disorder or bleeding gums  ALLERGY AND IMMUNOLOGIC: No hives or eczema  NEUROLOGICAL: No memory loss, loss of strength, numbness, or tremors

## 2020-09-02 NOTE — H&P ADULT - NSHPPHYSICALEXAM_GEN_ALL_CORE
PHYSICAL EXAM:  GENERAL: NAD, well nourished and confused  HEAD:  Atraumatic  EYES: EOM, PERRLA, conjunctiva pink and sclera white  ENT: No tonsillar erythema, exudates, or enlargement, moist mucous membranes, good dentition, no lesions  NECK: Supple, No JVD, normal thyroid, carotids with normal upstrokes and no bruits  CHEST/LUNG: Clear to auscultation bilaterally, No rales, rhonchi, wheezing, or rubs  HEART: Regular rate and rhythm, No murmurs, rubs, or gallops  ABDOMEN: Soft, nondistended, no masses, guarding, tenderness or rebound, bowel sounds present  EXTREMITIES:  2+ Peripheral Pulses, No clubbing, cyanosis, or edema.   LYMPH: No lymphadenopathy noted  SKIN: No rashes or lesions  NERVOUS SYSTEM:  confused

## 2020-09-02 NOTE — BEHAVIORAL HEALTH ASSESSMENT NOTE - NSBHCONSULTMEDSEVERE_PSY_A_CORE FT
-PRN Zyprexa 1.25mg IM q6h for severe agitation or dangerousness to self or others -PRN Zyprexa 1.25mg - 2.5mg IM q6h PRN for severe agitation (monitor EKG for QTc<500)

## 2020-09-02 NOTE — BEHAVIORAL HEALTH ASSESSMENT NOTE - HPI (INCLUDE ILLNESS QUALITY, SEVERITY, DURATION, TIMING, CONTEXT, MODIFYING FACTORS, ASSOCIATED SIGNS AND SYMPTOMS)
85yo female, in assisted living, hx of schizophrenia (diagnosed 1975) and dementia (diagnosed 2016), on prior psych hospitalization when diagnosed w/schizophrenia, treated w/ECT at that time, intermittent outpatient follow up and medication since then, no hx substance use, no hx SA or violence, PMH of Afib on coumadin, HTN, HLD, BIB daughter for confusion and agitation. Admitted to medicine for UTI, psych consulted for agitation.     Interview difficult given patient's agitation. Patient is oriented to person and place as "hospital," believes it is "winter or spring" of 2010. Repeatedly says she needs to return home to her cat and dog, though she has not lived alone or had pets in many years. Says she brought herself to the hospital for "a check up" and is unaware of her UTI diagnosis. Says she is "nervous" because she has been waiting all night to speak with a doctor, appears to have no memory of previous interactions with clinicians. Denies all other psychiatric symptoms including depressed mood, SI, HI, and psychotic symptoms.    Per collateral from patient's daughter Sania ([phone]), patient was diagnosed with schizophrenia approx 45 years ago, hospitalized and treated w/ECT at that time. Intermittent compliance with meds and outpatient therapy over the years. Diagnosed with dementia in 2016, at which time patient was living in [place] and stable on Haldol for schizophrenia hx. Daughter brought her back to [place], pt in assisted living since then. Daughter reports patient is "difficult and feisty" at baseline and is easily frustrated; she has frequent verbal outbursts but no violence history. Denies pt hx of mood symptoms. Daughter consents to neuroleptic use if patient becomes dangerous; refuses use of Zoloft or other antidepressants.

## 2020-09-02 NOTE — BEHAVIORAL HEALTH ASSESSMENT NOTE - RISK ASSESSMENT
Unable to determine Suicide Risk Patient with chronic risk factors including diagnosis of schizophrenia, prior psych hospitalization, hx of med noncompliance. Protective factors include no hx of SA, social support from family, high level of care at assisted living facility, no history of mood symptoms, and denial of current SI and depression.

## 2020-09-02 NOTE — BEHAVIORAL HEALTH ASSESSMENT NOTE - OTHER PAST PSYCHIATRIC HISTORY (INCLUDE DETAILS REGARDING ONSET, COURSE OF ILLNESS, INPATIENT/OUTPATIENT TREATMENT)
(as in HPI) Diagnosed w/paranoid schizophrenia approx 45 years ago. Had AH, persecutory delusions, and disorganized behavior at that time. She was hospitalized, treated with ECT. Followed up with outpatient psychiatrist for several years, but then had only intermittent med compliance and therapy over the years.     Diagnosed with dementia 2016 while living in [place]. At that time, patient was on Haldol for schizophrenia history. At some point this was switched to Seroquel, which patient has been taking for several years.

## 2020-09-02 NOTE — BEHAVIORAL HEALTH ASSESSMENT NOTE - SUMMARY
83yo female, in assisted living, hx of schizophrenia (diagnosed 1975) and dementia (diagnosed 2016), on prior psych hospitalization when diagnosed w/schizophrenia, treated w/ECT at that time, intermittent outpatient follow up and medication since then, no hx substance use, no hx SA or violence, PMH of Afib on coumadin, HTN, HLD, BIB daughter for confusion and agitation. Admitted to medicine for UTI, psych consulted for agitation. Patient is anxious, disoriented, with impaired attention, with similar symptoms since Mon per collateral.

## 2020-09-02 NOTE — BEHAVIORAL HEALTH ASSESSMENT NOTE - NSBHCHARTREVIEWINVESTIGATE_PSY_A_CORE FT
(6/21/20)  Ventricular Rate 84 BPM  Atrial Rate 88 BPM  QRS Duration 70 ms  Q-T Interval 366 ms  QTC Calculation(Bezet) 432 ms  R Axis -3 degrees  T Axis 3 degrees    ATRIAL FIBRILLATION  CANNOT RULE OUT ANTERIOR INFARCT , AGE UNDETERMINED  WHEN COMPARED WITH ECG OF 24-JAN-2020 21:43,  NO SIGNIFICANT CHANGE WAS FOUND

## 2020-09-02 NOTE — PROVIDER CONTACT NOTE (MEDICATION) - ASSESSMENT
Patient is uncooperative, agitated, refusing all po meds and all care. Patient on 1:1 for safety and elopement risk. Vitals stable

## 2020-09-02 NOTE — BEHAVIORAL HEALTH ASSESSMENT NOTE - CASE SUMMARY
85yo female, in assisted living, PPhx schizophrenia (diagnosed 1975) and dementia (diagnosed 2016), one prior psych hospitalization when diagnosed w/schizophrenia, treated w/ECT at that time, intermittent outpatient follow up and medication since then, no hx substance use, no hx SA or violence, PMH of Afib on coumadin, HTN, HLD, BIB daughter for confusion and agitation. Admitted to medicine for UTI, psych consulted for agitation.  On interview pt oriented to self only, aware she is in a hospital, but thinks year is 2010, unable to provide reason for ED transfer.  Pt irritable, calling out, but not physically aggressive.  Denies SI/HI, AVH, or paranoia.  Denies substance abuse.  Daughter notes worsening from baseline since Monday c/w delirium superimposed on dementia.  Agree with resident's assessment and plan as above, c/w home dose Seroquel, add Zyprexa 1.25mg-2.5mg PO/IM q6h PRN agitation, monitor EKG for QTc<500.  Check TSH, B12, RPR.  CL psych will f/u.

## 2020-09-03 NOTE — PHYSICAL THERAPY INITIAL EVALUATION ADULT - PERTINENT HX OF CURRENT PROBLEM, REHAB EVAL
Pt is a 83 y/o female admitted to [facility] on 9/2/20 hx dementia, htn, hld, schizophrenia, presenting from home w/ daughter at bedside for increased confusion and agitation. Pt is an assisted living pt at the [facility], daughter received a call yesterday from staff that pt was more agitated than usually and suggested that daughter pic her up in hopes that some "distraction" might improve the situation. Daughter has been with the pt the last ~24hrs and says pt's behavior has only worsened.

## 2020-09-03 NOTE — PROGRESS NOTE BEHAVIORAL HEALTH - NSBHCHARTREVIEWINVESTIGATE_PSY_A_CORE FT
< from: 12 Lead ECG (09.01.20 @ 23:24) >      Ventricular Rate 97 BPM    Atrial Rate 357 BPM    QRS Duration 66 ms    Q-T Interval 354 ms    QTC Calculation(Bezet) 449 ms    R Axis 16 degrees    T Axis 51 degrees    Diagnosis Line ATRIAL FIBRILLATION WITH PREMATURE VENTRICULAR OR ABERRANTLY CONDUCTED COMPLEXES  POSSIBLE SEPTAL INFARCT (CITED ON OR BEFORE 21-JUN-2020)  T WAVE ABNORMALITY, CONSIDER ANTERIOR ISCHEMIA  ABNORMAL ECG  WHEN COMPARED WITH ECG OF 21-JUN-2020 17:13, NO SIGNIFICANT CHANGE WAS FOUND    Confirmed by MD Kiana, Joseluis ([id number]) on 9/2/2020 8:28:18 PM    < end of copied text >

## 2020-09-03 NOTE — PROGRESS NOTE BEHAVIORAL HEALTH - NSBHFUPINTERVALHXFT_PSY_A_CORE
Patient verbally agitated, unwilling to answer questions.  When asked her name, says "Don't you know?!".  Aware she is in the hospital but does not know why, yells the year is "5000."  Was agitated earlier, required Zyprexa PRN X2 since yesterday.

## 2020-09-03 NOTE — PHYSICAL THERAPY INITIAL EVALUATION ADULT - PRECAUTIONS/LIMITATIONS, REHAB EVAL
Per daughter, pt is yelling about the whereabouts of her dogs that have been dead for the last 20 years, for example. Daughter notes that pt was put on Zoloft ~2 wks ago by [facility] doctor and wonders if this has caused her schizophrenia to "flair up". Per daughter pt's other psych meds include Seroquel and Namenda.(-) CT head

## 2020-09-03 NOTE — PROGRESS NOTE BEHAVIORAL HEALTH - RISK ASSESSMENT
Patient with chronic risk factors including diagnosis of schizophrenia, prior psych hospitalization, hx of med noncompliance. Protective factors include no hx of SA, social support from family, high level of care at assisted living facility, no history of mood symptoms, and denial of current SI and depression.

## 2020-09-03 NOTE — PROGRESS NOTE BEHAVIORAL HEALTH - NSBHCHARTREVIEWIMAGING_PSY_A_CORE FT
< from: CT Head No Cont (09.02.20 @ 00:12) >      Beam hardening artifact slightly obscures evaluation of the posterior fossa and brainstem.    There is no acute intra-axial or extra-axial hemorrhage. No mass effect or shift ofthe midline. The basal cisterns are not effaced. There is mild cerebral volume loss with proportional prominence of the ventricles and sulci. There are mild patchy areas of low attenuation within the periventricular and subcortical white matter whichare nonspecific but likely the sequela of chronic microvascular change. There is no CT evidence of a large vascular territory infarct. Intracranial vascular calcifications identified.    The visualized paranasal sinuses and mastoid air cells are wellaerated.    No acute displaced calvarium fracture. No significant scalp soft tissue swelling.    IMPRESSION:    No acute intracranial hemorrhage, mass effect, or acute displaced calvarium fracture. Mild involutional and chronic microvascular ischemicgliotic changes. If there are new or persistent symptoms, consider further evaluation with MRI provided no contraindications.    < end of copied text >

## 2020-09-03 NOTE — DISCHARGE NOTE NURSING/CASE MANAGEMENT/SOCIAL WORK - PATIENT PORTAL LINK FT
You can access the FollowMyHealth Patient Portal offered by [facility] by registering at the following website: [email or web address]. By joining [facility]’s FollowMyHealth portal, you will also be able to view your health information using other applications (apps) compatible with our system.

## 2020-09-03 NOTE — PROGRESS NOTE BEHAVIORAL HEALTH - NSBHCONSULTRECOMMENDOTHER_PSY_A_CORE FT
-NOTE: IM ZYPREXA SHOULD NOT BE COMBINED WITH ATIVAN DUE TO RISK FOR RESPIRATORY DEPRESSION; AVOID USING ATIVAN IN THIS PATIENT  -delirium recommendations: frequent reorientation, calm environment, light during day & dark at night, ensure patient has glasses  -clarify cigarette history, nicotine patch if patient currently still smoking

## 2020-09-03 NOTE — PROGRESS NOTE BEHAVIORAL HEALTH - NSBHCHARTREVIEWVS_PSY_A_CORE FT
T(C): 36.6 (09-03-20 @ 14:45), Max: 36.9 (09-02-20 @ 20:27)  HR: 94 (09-03-20 @ 14:45) (89 - 100)  BP: 134/65 (09-03-20 @ 14:45) (134/65 - 143/62)  RR: 18 (09-03-20 @ 14:45) (18 - 20)  SpO2: 98% (09-03-20 @ 14:45) (95% - 99%)  Wt(kg): --

## 2020-09-03 NOTE — PROGRESS NOTE BEHAVIORAL HEALTH - NSBHCHARTREVIEWLAB_PSY_A_CORE FT
\                      13.0   12.58 )-----------( 214      ( 03 Sep 2020 06:49 )             40.5         141  |  104  |  13  ----------------------------<  131<H>  3.7   |  25  |  0.79    Ca    10.3      03 Sep 2020 06:49    TPro  7.2  /  Alb  4.2  /  TBili  0.3  /  DBili  x   /  AST  39  /  ALT  22  /  AlkPhos  156<H>  0901    Urinalysis Basic - ( 01 Sep 2020 23:26 )    Color: Yellow / Appearance: Slightly Turbid / S.033 / pH: x  Gluc: x / Ketone: Trace  / Bili: Negative / Urobili: 3 mg/dL   Blood: x / Protein: 30 mg/dL / Nitrite: Negative   Leuk Esterase: Large / RBC: 3 /hpf /  /HPF   Sq Epi: x / Non Sq Epi: 1 /hpf / Bacteria: Negative

## 2020-09-03 NOTE — DISCHARGE NOTE NURSING/CASE MANAGEMENT/SOCIAL WORK - NSDCPEPTCOWAR_GEN_ALL_CORE
Warfarin/Coumadin - Follow up monitoring/Warfarin/Coumadin - Compliance/Warfarin/Coumadin - Dietary Advice/Warfarin/Coumadin - Potential for adverse drug reactions and interactions Warfarin/Coumadin - Compliance/Warfarin/Coumadin - Dietary Advice/Warfarin/Coumadin - Potential for adverse drug reactions and interactions/Warfarin/Coumadin - Follow up monitoring

## 2020-09-03 NOTE — DISCHARGE NOTE NURSING/CASE MANAGEMENT/SOCIAL WORK - NSDCPEPTCAREGIVEDUMATLIST _GEN_ALL_CORE
Warfarin/Coumadin/Influenza Vaccination Influenza Vaccination Influenza Vaccination/Warfarin/Coumadin

## 2020-09-03 NOTE — PROGRESS NOTE ADULT - SUBJECTIVE AND OBJECTIVE BOX
84F hx dementia, htn, hld, schizophrenia, presenting from home w/ daughter at bedside for increased confusion and agitation. Pt is an assisted living pt at the [facility], daughter received a call yesterday from staff that pt was more agitated than usually and suggested that daughter pic her up in hopes that some "distraction" might improve the situation. Daughter has been with the pt the last ~24hrs and says pt's behavior has only worsened. Per daughter, pt is yelling about the whereabouts of her dogs that have been dead for the last 20 years, for example. Daughter notes that pt was put on Zoloft ~2 wks ago by [facility] doctor and wonders if this has caused her schizophrenia to "flair up". Per daughter pt's other psych meds include seroquel and nemenda. Patient with positive UA but negative cultures. Patient has been very agitated with hx of schizophrenia and dementia.      MEDICATIONS  (STANDING):  acetaminophen   Tablet .. 975 milliGRAM(s) Oral every 6 hours  amLODIPine   Tablet 5 milliGRAM(s) Oral daily  atorvastatin 20 milliGRAM(s) Oral at bedtime  bisoprolol   Tablet 2.5 milliGRAM(s) Oral <User Schedule>  cefTRIAXone   IVPB 1000 milliGRAM(s) IV Intermittent every 24 hours  donepezil 10 milliGRAM(s) Oral at bedtime  influenza   Vaccine 0.5 milliLiter(s) IntraMuscular once  memantine 10 milliGRAM(s) Oral two times a day  pantoprazole    Tablet 40 milliGRAM(s) Oral before breakfast    MEDICATIONS  (PRN):  OLANZapine Injectable 2.5 milliGRAM(s) IntraMuscular once PRN Severe agitation          VITALS:   T(C): 36.6 (20 @ 14:45), Max: 36.9 (20 @ 20:27)  HR: 94 (20 @ 14:45) (89 - 100)  BP: 134/65 (20 @ 14:45) (134/65 - 143/62)  RR: 18 (20 @ 14:45) (18 - 20)  SpO2: 98% (20 @ 14:45) (95% - 99%)  Wt(kg): --     PHYSICAL EXAM:  	GENERAL: NAD, well nourished and confused  	HEAD:  Atraumatic  	EYES: EOM, PERRLA, conjunctiva pink and sclera white  	ENT: No tonsillar erythema, exudates, or enlargement, moist mucous membranes, good dentition, no lesions  	NECK: Supple, No JVD, normal thyroid, carotids with normal upstrokes and no bruits  	CHEST/LUNG: Clear to auscultation bilaterally, No rales, rhonchi, wheezing, or rubs  	HEART: Regular rate and rhythm, No murmurs, rubs, or gallops  	ABDOMEN: Soft, nondistended, no masses, guarding, tenderness or rebound, bowel sounds present  	EXTREMITIES:  2+ Peripheral Pulses, No clubbing, cyanosis, or edema.   	LYMPH: No lymphadenopathy noted  	SKIN: No rashes or lesions  NERVOUS SYSTEM:  confused    LABS:        CBC Full  -  ( 03 Sep 2020 06:49 )  WBC Count : 12.58 K/uL  RBC Count : 4.41 M/uL  Hemoglobin : 13.0 g/dL  Hematocrit : 40.5 %  Platelet Count - Automated : 214 K/uL  Mean Cell Volume : 91.8 fl  Mean Cell Hemoglobin : 29.5 pg  Mean Cell Hemoglobin Concentration : 32.1 gm/dL  Auto Neutrophil # : x  Auto Lymphocyte # : x  Auto Monocyte # : x  Auto Eosinophil # : x  Auto Basophil # : x  Auto Neutrophil % : x  Auto Lymphocyte % : x  Auto Monocyte % : x  Auto Eosinophil % : x  Auto Basophil % : x        141  |  104  |  13  ----------------------------<  131<H>  3.7   |  25  |  0.79    Ca    10.3      03 Sep 2020 06:49    TPro  7.2  /  Alb  4.2  /  TBili  0.3  /  DBili  x   /  AST  39  /  ALT  22  /  AlkPhos  156<H>  09    LIVER FUNCTIONS - ( 01 Sep 2020 23:43 )  Alb: 4.2 g/dL / Pro: 7.2 g/dL / ALK PHOS: 156 U/L / ALT: 22 U/L / AST: 39 U/L / GGT: x           PT/INR - ( 03 Sep 2020 07:58 )   PT: 17.2 sec;   INR: 1.47 ratio         PTT - ( 01 Sep 2020 23:43 )  PTT:39.2 sec  Urinalysis Basic - ( 01 Sep 2020 23:26 )    Color: Yellow / Appearance: Slightly Turbid / S.033 / pH: x  Gluc: x / Ketone: Trace  / Bili: Negative / Urobili: 3 mg/dL   Blood: x / Protein: 30 mg/dL / Nitrite: Negative   Leuk Esterase: Large / RBC: 3 /hpf /  /HPF   Sq Epi: x / Non Sq Epi: 1 /hpf / Bacteria: Negative      CAPILLARY BLOOD GLUCOSE          RADIOLOGY & ADDITIONAL TESTS:

## 2020-09-04 NOTE — PROGRESS NOTE ADULT - PROBLEM SELECTOR PLAN 2
will continue memantine and aricept  zyprexia started by psychiatry  will use haldol for agitation  psychiatry following

## 2020-09-04 NOTE — DISCHARGE NOTE PROVIDER - NSFOLLOWUPCLINICS_GEN_ALL_ED_FT
[facility]  Psychiatry  [id number] [place] [address]  Phone: [phone]  Fax:   Follow Up Time:

## 2020-09-04 NOTE — DISCHARGE NOTE PROVIDER - NSDCCPCAREPLAN_GEN_ALL_CORE_FT
PRINCIPAL DISCHARGE DIAGNOSIS  Diagnosis: UTI (urinary tract infection)  Assessment and Plan of Treatment: HOME CARE INSTRUCTIONS  f you were prescribed antibiotics, take them exactly as your caregiver instructs you. Finish the medication even if you feel better after you have only taken some of the medication.  Drink enough water and fluids to keep your urine clear or pale yellow.  Avoid caffeine, tea, and carbonated beverages. They tend to irritate your bladder.  Empty your bladder often. Avoid holding urine for long periods of time.  After a bowel movement, women should cleanse from front to back. Use each tissue only once.  SEEK MEDICAL CARE IF:  You have back pain.  You develop a fever.  Your symptoms do not begin to resolve within 3 days.  SEEK IMMEDIATE MEDICAL CARE IF:  You have severe back pain or lower abdominal pain.  You develop chills.  You have nausea or vomiting.  You have continued burning or discomfort with urination.      SECONDARY DISCHARGE DIAGNOSES  Diagnosis: Afib  Assessment and Plan of Treatment: Atrial fibrillation is the most common heart rhythm problem.  The condition puts you at risk for has stroke and heart attack  It helps if you control your blood pressure, not drink more than 1-2 alcohol drinks per day, cut down on caffeine, getting treatment for over active thyroid gland, and get regular exercise  Call your doctor if you feel your heart racing or beating unusually, chest tightness or pain, lightheaded, faint, shortness of breath especially with exercise  It is important to take your heart medication as prescribed  You may be on anticoagulation which is very important to take as directed - you may need blood work to monitor drug levels

## 2020-09-04 NOTE — DISCHARGE NOTE PROVIDER - HOSPITAL COURSE
83 yo woman with a hx of dementia and schizophrenia presents with a hx of worsening change in mental status. Patient found to have a Urinary tract infection.         UTI (urinary tract infection).  Plan: ceftriaxone x 3 days    encourage PO fluids.         Dementia.  Plan: will continue memantine and aricept    zyprexia started by psychiatry    will use haldol for agitation.         Afib.  Plan: will follow rate and add meds as needed    will continue coumadin dosing. 83 yo woman with a hx of dementia and schizophrenia presents with a hx of worsening change in mental status. Patient found to have a Urinary tract infection.         UTI (urinary tract infection).  Plan: ceftriaxone x 3 days    encourage PO fluids.         Dementia.  Plan: will continue memantine and aricept    zyprexia started by psychiatry    will use haldol for agitation.         Afib.  Plan: will follow rate and add meds as needed    will continue coumadin dosing.                 UTI (urinary tract infection).  Plan: abx dced    encourage PO fluids.         Dementia.     Plan: will continue memantine and aricept    zyprexia started by psychiatry    will use haldol for agitation    psychiatry following.         Afib.      Plan: will follow rate and add meds as needed    will continue coumadin dosing.         HTN (Hypertension).      Plan: will continue current meds    will adjust meds as needed.         Hypercholesterolemia.      Plan: continue cholesterol lowering meds. 85 yo woman with a hx of dementia and schizophrenia presents with a hx of worsening change in mental status. Patient found to have a Urinary tract infection.             UTI (urinary tract infection).  Plan: abx dced    encourage PO fluids.         Dementia.     Plan: will continue memantine and aricept    zyprexia started by psychiatry    will use haldol for agitation    psychiatry following.         Afib.      Plan: will follow rate and add meds as needed    will continue coumadin dosing.         HTN (Hypertension).      Plan: will continue current meds    will adjust meds as needed.         Hypercholesterolemia.      Plan: continue cholesterol lowering meds.         no psychiatric contraindications to discharge    return to assisted living facility, f/u w/OPP            On date of discharge, patient was evaluated and determined to be clinically stable; patient was assessed to be an appropriate candidate for discharge as per discussion with Medicine attending.

## 2020-09-04 NOTE — PROGRESS NOTE ADULT - SUBJECTIVE AND OBJECTIVE BOX
84F hx dementia, htn, hld, schizophrenia, presenting from home w/ daughter at bedside for increased confusion and agitation. Pt is an assisted living pt at the [facility], daughter received a call yesterday from staff that pt was more agitated than usually and suggested that daughter pic her up in hopes that some "distraction" might improve the situation. Daughter has been with the pt the last ~24hrs and says pt's behavior has only worsened. Per daughter, pt is yelling about the whereabouts of her dogs that have been dead for the last 20 years, for example. Daughter notes that pt was put on Zoloft ~2 wks ago by [facility] doctor and wonders if this has caused her schizophrenia to "flair up". Per daughter pt's other psych meds include seroquel and nemenda.Pt seem less agitated today. able to hold a conversation.       MEDICATIONS  (STANDING):  acetaminophen   Tablet .. 975 milliGRAM(s) Oral every 6 hours  amLODIPine   Tablet 5 milliGRAM(s) Oral daily  atorvastatin 20 milliGRAM(s) Oral at bedtime  bisoprolol   Tablet 2.5 milliGRAM(s) Oral <User Schedule>  donepezil 10 milliGRAM(s) Oral at bedtime  memantine 10 milliGRAM(s) Oral two times a day  pantoprazole    Tablet 40 milliGRAM(s) Oral before breakfast  QUEtiapine 200 milliGRAM(s) Oral at bedtime  warfarin 2 milliGRAM(s) Oral once    MEDICATIONS  (PRN):  OLANZapine Injectable 2.5 milliGRAM(s) IntraMuscular once PRN Severe agitation          VITALS:   T(C): 36.1 (09-04-20 @ 11:40), Max: 36.9 (09-03-20 @ 20:49)  HR: 102 (09-04-20 @ 11:40) (94 - 102)  BP: 160/98 (09-04-20 @ 11:40) (136/82 - 160/98)  RR: 18 (09-04-20 @ 11:40) (18 - 18)  SpO2: 96% (09-04-20 @ 11:40) (96% - 96%)  Wt(kg): --     PHYSICAL EXAM:  	GENERAL: NAD, well nourished and confused  	HEAD:  Atraumatic  	EYES: EOM, PERRLA, conjunctiva pink and sclera white  	ENT: No tonsillar erythema, exudates, or enlargement, moist mucous membranes, good dentition, no lesions  	NECK: Supple, No JVD, normal thyroid, carotids with normal upstrokes and no bruits  	CHEST/LUNG: Clear to auscultation bilaterally, No rales, rhonchi, wheezing, or rubs  	HEART: Regular rate and rhythm, No murmurs, rubs, or gallops  	ABDOMEN: Soft, nondistended, no masses, guarding, tenderness or rebound, bowel sounds present  	EXTREMITIES:  2+ Peripheral Pulses, No clubbing, cyanosis, or edema.   	LYMPH: No lymphadenopathy noted  	SKIN: No rashes or lesions  NERVOUS SYSTEM:  confused    LABS:        CBC Full  -  ( 04 Sep 2020 06:46 )  WBC Count : 10.93 K/uL  RBC Count : 4.26 M/uL  Hemoglobin : 12.5 g/dL  Hematocrit : 39.5 %  Platelet Count - Automated : 217 K/uL  Mean Cell Volume : 92.7 fl  Mean Cell Hemoglobin : 29.3 pg  Mean Cell Hemoglobin Concentration : 31.6 gm/dL  Auto Neutrophil # : x  Auto Lymphocyte # : x  Auto Monocyte # : x  Auto Eosinophil # : x  Auto Basophil # : x  Auto Neutrophil % : x  Auto Lymphocyte % : x  Auto Monocyte % : x  Auto Eosinophil % : x  Auto Basophil % : x    09-03    141  |  104  |  13  ----------------------------<  131<H>  3.7   |  25  |  0.79    Ca    10.3      03 Sep 2020 06:49        PT/INR - ( 04 Sep 2020 09:05 )   PT: 20.5 sec;   INR: 1.79 ratio         PTT - ( 04 Sep 2020 09:05 )  PTT:32.6 sec    CAPILLARY BLOOD GLUCOSE          RADIOLOGY & ADDITIONAL TESTS:

## 2020-09-04 NOTE — DISCHARGE NOTE PROVIDER - NSDCMRMEDTOKEN_GEN_ALL_CORE_FT
acetaminophen 325 mg oral tablet: 2 tab(s) orally every 8 hours, As Needed  amLODIPine 5 mg oral tablet: 1 tab(s) orally once a day  atorvastatin 20 mg oral tablet: 1 tab(s) orally once a day  bisoprolol 5 mg oral tablet: 1 tab(s) orally once a day in morning   Colace 100 mg oral capsule: 2 cap(s) orally once a day (at bedtime)  donepezil 10 mg oral tablet: 1 tab(s) orally once a day (at bedtime)  memantine 10 mg oral tablet: 1 tab(s) orally once in morning   omeprazole 40 mg oral delayed release capsule: 1 cap(s) orally once a day  SEROquel 50 mg oral tablet: 1 tablet by mouth twice during day and 1 tablet by mouth at bedtime  Vitamin B12 1000 mcg oral tablet: 1 tab(s) orally once a day  Vitamin D3 1000 intl units (25 mcg) oral tablet: 1 tab(s) orally once a day  warfarin 2 mg oral tablet: 1 tablet once a day on sunday, tuesday, wedneday, thursday, saturday.   warfarin 2 mg oral tablet: 2 tab(s) orally once a day on monday and friday  Zoloft 50 mg oral tablet: 1 tab(s) orally once a day acetaminophen 325 mg oral tablet: 2 tab(s) orally every 8 hours, As Needed  amLODIPine 5 mg oral tablet: 1 tab(s) orally once a day  atorvastatin 20 mg oral tablet: 1 tab(s) orally once a day  bisoprolol 5 mg oral tablet: 1 tab(s) orally once a day in morning   Colace 100 mg oral capsule: 2 cap(s) orally once a day (at bedtime)  donepezil 10 mg oral tablet: 1 tab(s) orally once a day (at bedtime)  memantine 10 mg oral tablet: 1 tab(s) orally once in morning   omeprazole 40 mg oral delayed release capsule: 1 cap(s) orally once a day  SEROquel 50 mg oral tablet: 1 tablet by mouth twice during day and 1 tablet by mouth at bedtime  Vitamin B12 1000 mcg oral tablet: 1 tab(s) orally once a day  Vitamin D3 1000 intl units (25 mcg) oral tablet: 1 tab(s) orally once a day  warfarin 2 mg oral tablet: 1 tablet once a day on sunday, tuesday, wedneday, thursday, saturday.   warfarin 2 mg oral tablet: 2 tab(s) orally once a day on monday and friday acetaminophen 325 mg oral tablet: 2 tab(s) orally every 8 hours, As Needed  amLODIPine 5 mg oral tablet: 1 tab(s) orally once a day  Ativan 0.5 mg oral tablet: 1 tab(s) orally every 6 hours, As Needed -for agitation MDD:4 tabs   atorvastatin 20 mg oral tablet: 1 tab(s) orally once a day  bisoprolol 5 mg oral tablet: 1 tab(s) orally once a day in morning   Colace 100 mg oral capsule: 2 cap(s) orally once a day (at bedtime)  donepezil 10 mg oral tablet: 1 tab(s) orally once a day (at bedtime)  memantine 10 mg oral tablet: 1 tab(s) orally once in morning   omeprazole 40 mg oral delayed release capsule: 1 cap(s) orally once a day  SEROquel 50 mg oral tablet: 1 tablet by mouth twice during day and 1 tablet by mouth at bedtime  Vitamin B12 1000 mcg oral tablet: 1 tab(s) orally once a day  Vitamin D3 1000 intl units (25 mcg) oral tablet: 1 tab(s) orally once a day  warfarin 2 mg oral tablet: 1 tablet once a day on sunday, tuesday, wedneday, thursday, saturday.   warfarin 2 mg oral tablet: 2 tab(s) orally once a day on monday and friday

## 2020-09-04 NOTE — DISCHARGE NOTE PROVIDER - CARE PROVIDER_API CALL
Marcelino Coulter  [facility]  [address], [address]  [place] [address]  Phone: [phone]  Fax: [phone]  Follow Up Time:

## 2020-09-04 NOTE — DISCHARGE NOTE PROVIDER - NSDCFUADDAPPT_GEN_ALL_CORE_FT
Please call and make a follow up appointment with your PCP upon discharge from hospital.    Please call and make a follow up appointment with care providers listed upon discharge from hospital. Please call and make a follow up appointment with your PCP upon discharge from hospital.  ***Follow up with physician regarding Coumadin for PT/INR checks.****      Please call and make a follow up appointment with care providers listed upon discharge from hospital.

## 2020-09-04 NOTE — PROVIDER CONTACT NOTE (OTHER) - SITUATION
pt refused PAS ..pt explained and understood need for VTE ppx ..pt confused at times,pt on coumadin po

## 2020-09-05 NOTE — PROGRESS NOTE ADULT - PROBLEM SELECTOR PLAN 2
will continue memantine and aricept  zyprexia started by psychiatry  currently on seroquel at night  will use ativan PRN for agitation  psychiatry following

## 2020-09-05 NOTE — PROVIDER CONTACT NOTE (OTHER) - DATE AND TIME:
When Your Child Has Congestive Heart Failure (CHF)  Congestive heart failure (CHF) is a condition in which the heart does not pump as well as it should. When this happens, fluid can build up in the lungs or body tissues (congestion). CHF can cause lung problems, organ failure, and other serious problems in the body. CHF can usually be treated, but it is important to find out the underlying cause. Your childs healthcare provider will evaluate your childs heart and discuss treatment options with you.  What causes congestive heart failure?  CHF often develops in children with certain heart defects present at birth (congenital heart defects). These include defects such as holes in the heart, which cause an increased amount of blood flow from one side of the heart to the other. This changes the dynamics of blood flow and can cause one side of the heart to become weaker. The heart then is unable to support the blood flow resulting in worsening heart function. CHF can also be caused by other types of heart problems such as cardiomyopathy, a condition in which the hearts pumping function is impaired. Some non-heart problems, such as kidney failure, can lead to CHF due to changes in the body's fluid balance or hormone changes that lead to high blood pressure.    What are the symptoms of CHF?  Symptoms vary but may include:  · Swelling (edema) in the face, abdomen, ankles, or feet  · Shortness of breath, rapid breathing, wheezing, or excessive coughing  · Sweating  · Weakness or tiredness  · Poor feeding and weight gain (in infants)  · Racing heartbeat  · Wheezing  · Abdominal pain and nausea  In older children, symptoms may also include:  · Weight loss  · Passing out  · Chest pain  · Tiring easily during exercise  How is the cause of congestive heart failure diagnosed?  Heart problems in children are usually diagnosed and treated by a pediatric cardiologist. This is a doctor who specializes in diagnosing and treating  children's heart disease. The cardiologist will do a physical exam and ask about your childs health history. The following tests may be done to find the underlying cause of CHF:  · Chest X-ray. This test takes a picture of the heart and lungs. The picture can show your childs heart size and shape. This picture also shows the fluid status of your child's lungs, which can be a clue to the heart's function.  · Electrocardiogram (ECG or EKG). During this test, the electrical activity of the heart is recorded to check for heart rhythm problems (arrhythmias) or problems with heart structure.  · Echocardiogram (echo). During this test, sound waves (ultrasound) are used to create a picture of the heart. This test can show problems with heart structure or function. This includes showing how well the heart pumps, if the heart is enlarged, the direction and strength of blood flow, or if there are any valve problems.  · Lab tests. For these tests, blood and urine samples are taken to check for problems in the kidneys or other organs.  How is congestive heart failure treated?  Specific treatment for your child depends on the cause of CHF. If the cause of CHF in your child is a congenital heart defect, a catheter or surgical procedure may be done to repair the defect.  · Medicines are often prescribed to help manage your childs symptoms. These can include:  ¨ Diuretics help rid the body of excess water. This reduces fluid in the lungs and may improve breathing. These are very important in helping manage fluid status in heart failure.  ¨ Digoxin helps the heart pump blood with more force. This improves the hearts performance.  ¨ ACE inhibitors make blood vessels relax and allow blood to flow more easily from the heart.   ¨ Angiotensin receptor blockers or ARBs are very similar to ACE inhibitors. They may be used in a child who can't take an ACE inhibitor.  ¨ Beta-blockers lower blood pressure and slow heart rate by altering  05-Sep-2020 22:00 hormones that can damage the heart. Beta-blockers can also improve the hearts pumping action over time.  · Pacemaker. Some children with heart failure need an artificial pacemaker. The pacemaker may help when the heart is not pumping well because of a slow heartbeat.  · Cardiac resynchronization therapy. This uses a special type of pacemaker that paces both pumping chambers of the heart at the same time to coordinate contractions and improve the heart's function. This treatment may be used in some children with long-term heart failure.  · Heart transplant. This is when the diseased heart is replaced with a healthy heart from a donor. This is only an option for very serious disease. And, it often takes some time to find a suitable heart. In some cases, a child may be able to be helped with devices that help the heart pump while he or she waits for a transplant.  Your child may benefit from seeing a nutritionist to help with nutrition for growth problems and to help balance fluids. He or she may also participate in an exercise rehab program to help with the ability to be active.  What are the long-term concerns?  The outcome for a child with CHF depends on many factors, including the underlying heart problem. The cardiologist will discuss your childs condition, treatment options, and potential outcomes with you.  Date Last Reviewed: 3/6/2016  © 1653-5265 [employer]. [address] [place] [address], [place] [address]. All rights reserved. This information is not intended as a substitute for professional medical care. Always follow your healthcare professional's instructions.        Heart Failure: Being Active  You have a condition called heart failure. Being active doesnt mean that you have to wear yourself out. Even a little movement each day helps to strengthen your heart. If you cant get out to exercise, you can do simple stretching and strengthening exercises at home. These are good ways to keep you  well-conditioned and prevent you and your heart from becoming excessively weak.    Ideas to get you started  · Add a little movement to things you do now. Walk to mail letters. Park your car at the far end of the parking lot and walk to the store. Walk up a flight of stairs instead of taking the elevator.  · Choose activities you enjoy. You might walk, swim, or ride an exercise bike. Things like gardening and washing the car count, too. Other possibilities include: washing dishes, walking the dog, walking around the mall, and doing aerobic activities with friends.  · Join a group exercise program at a [facility] or [facility], a senior center, or a community center. Or look into a hospital cardiac rehabilitation program. Ask your doctor if you qualify.  Tips to keep you going  · Get up and get dressed each day. Go to a coffee shop and read a newspaper or go somewhere that you'll be in the presence of other active people. Youll feel more like being active.  · Make a plan. Choose one or more activities that you enjoy and that you can easily do. Then plan to do at least one each day. You might write your plan on a calendar.  · Go with a friend or a group if you like company. This can help you feel supported and stay motivated, too.  · Plan social events that you enjoy. This will keep you mentally engaged as well as physically motivated to do things you find pleasure in.  For your safety  · Talk with your healthcare provider before starting an exercise program.  · Exercise indoors when its too hot or too cold outside, or when the air quality is poor. Try walking at a shopping mall.  · Wear socks and sturdy shoes to maintain your balance and prevent falls.  · Start slowly. Do a few minutes several times a day at first. Increase your time and speed little by little.  · Stop and rest whenever you feel tired or get short of breath.  · Dont push yourself on days when you dont feel well.  Date Last Reviewed: 3/20/2016  © 1882-4022  [employer]. [address] [place] [address], [place] [address]. All rights reserved. This information is not intended as a substitute for professional medical care. Always follow your healthcare professional's instructions.        What is Heart Failure?  The heart is a muscle that pumps oxygen-rich blood to all parts of the body. When you have heart failure, the heart is not able to pump as well as it should. Blood and fluid may back up into the lungs, and some parts of the body dont get enough oxygen-rich blood to work normally. These problems lead to the symptoms you feel.  When you have heart failure  With heart failure, not enough oxygen-rich blood leaves the heart with each beat. There are 2 types of heart failure. Both affect the ventricles ability to pump blood. You may have 1 or both types.       Systolic heart failure. The heart muscle becomes weak and enlarged. It cant pump enough oxygen-rich blood forward to the rest of the body when the ventricles contract. The measurement of how much blood your heart pushes out when it beats is called ejection fraction. In systolic heart failure, the ejection fraction is lower than normal. This can cause blood to back up into the lungs and cause shortness of breath and eventually ankle swelling (edema).  This is also called heart failure with reduced ejection fraction, or  HFrEF.    Diastolic heart failure. The heart muscle becomes stiff. It doesnt relax normally between contractions, which keeps the ventricles from filling with blood. Ejection fraction is often in the normal range. This can still lead to the backup of blood into the body and affect the organs such as the liver. This is also called heart failure with preserved ejection fraction or HFpEF.     Recognizing heart failure symptoms  When you have heart failure, you need to pay close attention to your body and how you feel, every single day. That way, if a problem occurs, you can get help before it  becomes too severe. You'll need to watch for changes in your symptoms. As long as symptoms stay about the same from one day to the next, your heart failure is stable. But if symptoms start to get worse, it's time to take action.  Signs and symptoms of worsening heart failure include:  · Rapid weight gain  · Shortness of breath  · Swelling (edema)  · Fatigue  How heart failure affects your body  When the heart doesn't pump enough blood, hormones (body chemicals) are sent to increase the amount of work the heart does. Some hormones make the heart grow larger. Others tell the heart to pump faster. As a result, the heart may pump more blood at first, but it can't keep up with the ongoing demands. So, the heart muscle becomes even more weak. Over time, even less blood is pumped through the heart. This leads to problems throughout the body as organs began to feel the effects of a long-term lack of oxygen. Eventually, if untreated, this can cause problems with your lungs, liver, kidneys, and loss of elasticity in the skin, and skin changes in the lower legs. A weak heart itself can eventually cause a severe decline in health and possible death if left untreated.  What is ejection fraction?  Ejection fraction (EF) measures how much blood the heart pumps out (ejects). This is measured to help diagnose heart failure. A healthy heart pumps at least half of the blood from the ventricles with each beat. This means a normal ejection fraction is around 50% to 70%. Your doctor will calculate ejection fraction from an echocardiogram.     My ejection fraction     Date: ______________________  Ejection fraction: _____________  Test used: __________________  Date Last Reviewed: 3/15/2016  © 7841-4628 [employer]. [address] [place] [address], [place] [address]. All rights reserved. This information is not intended as a substitute for professional medical care. Always follow your healthcare professional's instructions.

## 2020-09-05 NOTE — PROGRESS NOTE ADULT - SUBJECTIVE AND OBJECTIVE BOX
84F hx dementia, htn, hld, schizophrenia, presenting from home w/ daughter at bedside for increased confusion and agitation. Pt is an assisted living pt at the [facility], daughter received a call yesterday from staff that pt was more agitated than usually and suggested that daughter pic her up in hopes that some "distraction" might improve the situation. Daughter has been with the pt the last ~24hrs and says pt's behavior has only worsened. Per daughter, pt is yelling about the whereabouts of her dogs that have been dead for the last 20 years, for example. Daughter notes that pt was put on Zoloft ~2 wks ago by [facility] doctor and wonders if this has caused her schizophrenia to "flair up". Per daughter pt's other psych meds include seroquel and nemenda. Pt agitated today. has had waxing and waning mental status     MEDICATIONS  (STANDING):  acetaminophen   Tablet .. 975 milliGRAM(s) Oral every 6 hours  amLODIPine   Tablet 5 milliGRAM(s) Oral daily  atorvastatin 20 milliGRAM(s) Oral at bedtime  bisoprolol   Tablet 2.5 milliGRAM(s) Oral <User Schedule>  donepezil 10 milliGRAM(s) Oral at bedtime  memantine 10 milliGRAM(s) Oral two times a day  pantoprazole    Tablet 40 milliGRAM(s) Oral before breakfast  QUEtiapine 200 milliGRAM(s) Oral at bedtime    MEDICATIONS  (PRN):  LORazepam   Injectable 0.5 milliGRAM(s) IV Push every 6 hours PRN Agitation  OLANZapine Injectable 2.5 milliGRAM(s) IntraMuscular once PRN Severe agitation          VITALS:   T(C): 36.4 (09-05-20 @ 03:59), Max: 37.2 (09-04-20 @ 21:08)  HR: 98 (09-05-20 @ 03:59) (94 - 101)  BP: 141/73 (09-05-20 @ 03:59) (105/84 - 145/82)  RR: 18 (09-05-20 @ 03:59) (18 - 18)  SpO2: 96% (09-05-20 @ 03:59) (96% - 97%)  Wt(kg): --     PHYSICAL EXAM:  	GENERAL: NAD, well nourished and confused  	HEAD:  Atraumatic  	EYES: EOM, PERRLA, conjunctiva pink and sclera white  	ENT: No tonsillar erythema, exudates, or enlargement, moist mucous membranes, good dentition, no lesions  	NECK: Supple, No JVD, normal thyroid, carotids with normal upstrokes and no bruits  	CHEST/LUNG: Clear to auscultation bilaterally, No rales, rhonchi, wheezing, or rubs  	HEART: Regular rate and rhythm, No murmurs, rubs, or gallops  	ABDOMEN: Soft, nondistended, no masses, guarding, tenderness or rebound, bowel sounds present  	EXTREMITIES:  2+ Peripheral Pulses, No clubbing, cyanosis, or edema.   	LYMPH: No lymphadenopathy noted  	SKIN: No rashes or lesions  NERVOUS SYSTEM:  confused    LABS:        CBC Full  -  ( 04 Sep 2020 06:46 )  WBC Count : 10.93 K/uL  RBC Count : 4.26 M/uL  Hemoglobin : 12.5 g/dL  Hematocrit : 39.5 %  Platelet Count - Automated : 217 K/uL  Mean Cell Volume : 92.7 fl  Mean Cell Hemoglobin : 29.3 pg  Mean Cell Hemoglobin Concentration : 31.6 gm/dL  Auto Neutrophil # : x  Auto Lymphocyte # : x  Auto Monocyte # : x  Auto Eosinophil # : x  Auto Basophil # : x  Auto Neutrophil % : x  Auto Lymphocyte % : x  Auto Monocyte % : x  Auto Eosinophil % : x  Auto Basophil % : x            PT/INR - ( 05 Sep 2020 08:29 )   PT: 21.8 sec;   INR: 1.89 ratio         PTT - ( 05 Sep 2020 08:29 )  PTT:31.9 sec    CAPILLARY BLOOD GLUCOSE          RADIOLOGY & ADDITIONAL TESTS:

## 2020-09-06 NOTE — PROGRESS NOTE ADULT - SUBJECTIVE AND OBJECTIVE BOX
84F hx dementia, htn, hld, schizophrenia, presenting from home w/ daughter at bedside for increased confusion and agitation. Pt is an assisted living pt at the [facility], daughter received a call yesterday from staff that pt was more agitated than usually and suggested that daughter pic her up in hopes that some "distraction" might improve the situation. Daughter has been with the pt the last ~24hrs and says pt's behavior has only worsened. Per daughter, pt is yelling about the whereabouts of her dogs that have been dead for the last 20 years, for example. Daughter notes that pt was put on Zoloft ~2 wks ago by [facility] doctor and wonders if this has caused her schizophrenia to "flair up". Per daughter pt's other psych meds include seroquel and nemenda.  has had waxing and waning mental status. Slight improvement today.     MEDICATIONS  (STANDING):  acetaminophen   Tablet .. 975 milliGRAM(s) Oral every 6 hours  amLODIPine   Tablet 5 milliGRAM(s) Oral daily  atorvastatin 20 milliGRAM(s) Oral at bedtime  bisoprolol   Tablet 2.5 milliGRAM(s) Oral <User Schedule>  donepezil 10 milliGRAM(s) Oral at bedtime  memantine 10 milliGRAM(s) Oral two times a day  pantoprazole    Tablet 40 milliGRAM(s) Oral before breakfast  QUEtiapine 200 milliGRAM(s) Oral at bedtime    MEDICATIONS  (PRN):  LORazepam   Injectable 0.5 milliGRAM(s) IV Push every 6 hours PRN Agitation  OLANZapine Injectable 2.5 milliGRAM(s) IntraMuscular once PRN Severe agitation          VITALS:   T(C): 36.8 (09-06-20 @ 03:55), Max: 36.8 (09-06-20 @ 03:55)  HR: 86 (09-06-20 @ 03:55) (86 - 86)  BP: 127/80 (09-06-20 @ 03:55) (127/80 - 131/86)  RR: 18 (09-06-20 @ 03:55) (18 - 18)  SpO2: 96% (09-06-20 @ 03:55) (96% - 96%)  Wt(kg): --     PHYSICAL EXAM:  	GENERAL: NAD, well nourished and confused  	HEAD:  Atraumatic  	EYES: EOM, PERRLA, conjunctiva pink and sclera white  	ENT: No tonsillar erythema, exudates, or enlargement, moist mucous membranes, good dentition, no lesions  	NECK: Supple, No JVD, normal thyroid, carotids with normal upstrokes and no bruits  	CHEST/LUNG: Clear to auscultation bilaterally, No rales, rhonchi, wheezing, or rubs  	HEART: Regular rate and rhythm, No murmurs, rubs, or gallops  	ABDOMEN: Soft, nondistended, no masses, guarding, tenderness or rebound, bowel sounds present  	EXTREMITIES:  2+ Peripheral Pulses, No clubbing, cyanosis, or edema.   	LYMPH: No lymphadenopathy noted  	SKIN: No rashes or lesions  NERVOUS SYSTEM:  confused    LABS:        CBC Full  -  ( 06 Sep 2020 05:59 )  WBC Count : 11.48 K/uL  RBC Count : 4.21 M/uL  Hemoglobin : 12.2 g/dL  Hematocrit : 39.4 %  Platelet Count - Automated : 213 K/uL  Mean Cell Volume : 93.6 fl  Mean Cell Hemoglobin : 29.0 pg  Mean Cell Hemoglobin Concentration : 31.0 gm/dL  Auto Neutrophil # : x  Auto Lymphocyte # : x  Auto Monocyte # : x  Auto Eosinophil # : x  Auto Basophil # : x  Auto Neutrophil % : x  Auto Lymphocyte % : x  Auto Monocyte % : x  Auto Eosinophil % : x  Auto Basophil % : x    09-06    139  |  105  |  8   ----------------------------<  156<H>  3.8   |  24  |  0.75    Ca    9.8      06 Sep 2020 05:58        PT/INR - ( 06 Sep 2020 09:43 )   PT: 21.0 sec;   INR: 1.83 ratio         PTT - ( 05 Sep 2020 08:29 )  PTT:31.9 sec    CAPILLARY BLOOD GLUCOSE          RADIOLOGY & ADDITIONAL TESTS:

## 2020-09-06 NOTE — PROGRESS NOTE ADULT - ATTENDING COMMENTS
Discussed with Patients daughter. will make arrangement to get Patient back [facility] with assistance   Daughter is working with Care coordinator

## 2020-12-26 NOTE — ED PROVIDER NOTE - PROGRESS NOTE DETAILS
Sherrell PGY2:  discussed with daughter, concerned about continued weight loss. Had lost 20 lb prior to September as well. Will get CTAP and chest to assess for malignancy and admit for FTT. Sherrell PGy2: per hospitalist, Marylin admits to [facility], paged Sherrell PGY2: no response from [facility], will admit to hospitalist

## 2020-12-26 NOTE — ED ADULT NURSE NOTE - NSIMPLEMENTINTERV_GEN_ALL_ED
Implemented All Fall with Harm Risk Interventions:  [place] to call system. Call bell, personal items and telephone within reach. Instruct patient to call for assistance. Room bathroom lighting operational. Non-slip footwear when patient is off stretcher. Physically safe environment: no spills, clutter or unnecessary equipment. Stretcher in lowest position, wheels locked, appropriate side rails in place. Provide visual cue, wrist band, yellow gown, etc. Monitor gait and stability. Monitor for mental status changes and reorient to person, place, and time. Review medications for side effects contributing to fall risk. Reinforce activity limits and safety measures with patient and family. Provide visual clues: red socks.

## 2020-12-26 NOTE — ED PROVIDER NOTE - PHYSICAL EXAMINATION
Physical Exam:  Gen: NAD, AOx2, non-toxic appearing  Head: hemostatic abrasion to L occipital area  HEENT: EOMI, PEERLA, normal conjunctiva, tongue midline, oral mucosa moist  Lung: CTAB, no respiratory distress, no wheezes/rhonchi/rales B/L, speaking in full sentences  CV: RRR, no murmurs, rubs or gallops, distal pulses 2+ b/l  Abd: soft, NT, ND, no guarding, no rigidity, no rebound tenderness, no CVA tenderness   MSK: no visible deformities, ROM normal in UE/LE, mild L lumbar paraspinal TTP, no midline spinal TTP  Neuro: CN II-XII intact, negative Romberg, normal FTN, no nystagmus, 5/5 strength b/l, sensation intact b/l  Skin: Warm, well perfused, no rash, no leg swelling  Psych: normal affect, calm

## 2020-12-26 NOTE — ED ADULT NURSE REASSESSMENT NOTE - NS ED NURSE REASSESS COMMENT FT1
Patient straight cathed for urine using sterile technique. Second RN present to confirm sterility. Explained procedure as it was being done - Pt tolerated procedure well. Sterile specimens collected and sent to lab as ordered. drained aprox 50ml of urine. Comfort and safety provided.

## 2020-12-26 NOTE — ED PROVIDER NOTE - OBJECTIVE STATEMENT
86yo female on coumadin, HTN, HLD, dementia (A&Ox1-2), schizophrenia p/w L lower back pain s/p fall. Per EMS,  witnessed slip and fall, no LOC. Denies numbness, focal weakness, change in vision, headache, fever. Daughter concerned as patient has had unintentional 15 lb weight loss in 3 months and feels like she is declining in health.

## 2020-12-26 NOTE — ED PROVIDER NOTE - ATTENDING CONTRIBUTION TO CARE
Patient from assisted living, had witnessed fall there without LOC so brought to Emergency Department for evaluation of injury.  Daughter present with patient also concerned that patient has lost significant weight over last 9 months without noted cause on prior workups.  Supposed to be getting Ensure at assisted living but unknown if she is drinking any.  Patient denying complaints to me other than feeling tired.    Exam:  General: Patient well appearing, vital signs within normal limits  HEENT: airway patent with moist mucous membranes  Cardiac: RRR S1/S2 with strong peripheral pulses  Respiratory: lungs clear without respiratory distress  GI: abdomen soft, non tender, non distended  Neuro: no gross neurologic deficits  Skin: warm, well perfused  Psych: normal mood and affect    Patient without obvious trauma from fall on exam, will obtain screening labs given weight loss/FTT reported by daughter, imaging to screen for traumatic injury, re-evaluate.

## 2020-12-26 NOTE — ED ADULT NURSE NOTE - OBJECTIVE STATEMENT
86 yo female with PMH of dementia, afib, HTN, hypercholesterolemia and hypothyroidism presents [facility] s/p trip and fall. Per EMS, pt tripped and fell with no witnessed LOC. Pt is on Coumadin. Pt reports pain in lower back. Denies SOB, chest pain, abdominal pain, nausea/vomiting, urinary s/s, diarrhea/constipation. Pt A&O x 4, demented at baseline. Respirations even and unlabored. Muscle strength equal in upper and lower extremities. Sensation intact. Peripheral pulses strong. Bed in lowest position, side rails up and call bell in reach. Pt instructed to ring call bell if anything is needed. 84 yo female with PMH of dementia, afib, HTN, hypercholesterolemia and hypothyroidism presents [facility] ED [facility] s/p trip and fall. Per EMS, pt tripped and fell with no witnessed LOC. Pt is on Coumadin. Pt reports pain in lower back. Denies SOB, chest pain, abdominal pain, nausea/vomiting, urinary s/s, diarrhea/constipation. Pt A&O x 4, demented at baseline. Respirations even and unlabored. Muscle strength equal in upper and lower extremities. Sensation intact. Peripheral pulses strong. Pt placed on cardiac monitor, endorsing afib. Bed in lowest position, side rails up and call bell in reach. Pt instructed to ring call bell if anything is needed.

## 2020-12-26 NOTE — ED PROVIDER NOTE - CARE PLAN
Principal Discharge DX:	Abrasion of scalp, initial encounter  Secondary Diagnosis:	Failure to thrive in adult

## 2020-12-26 NOTE — ED PROVIDER NOTE - CLINICAL SUMMARY MEDICAL DECISION MAKING FREE TEXT BOX
86yo female on coumadin p/w scalp abrasion, L lower back TTP s/p mechanical fall. Non-focal neuro exam. Low suspicion for syncope. Tachycardia may be 2/2 dehydration vs malnutrition vs underlying infection vs pain. R/o intracranial hemorrhage. CT head C spine, XR's, labs, US, fluids and reassess. May need admission for failure to thrive.

## 2020-12-27 NOTE — PROGRESS NOTE ADULT - PROBLEM SELECTOR PLAN 1
CT w/ Numerous solid pulmonary nodules and hypoattenuating lesions in the liver, highly suspicious for metastatic disease, primary unknown ; discussed findings with daughter , who states that family would not wish to pursue aggressive / invasive work up, but is interested in finding source ,  in addition would not elect to undergo chemotherapy , would like to focus on comfort and palliation ,  - oncology consult - to be arranged by day team  - Palliative care consult - to be arranged by day team CT w/ Numerous solid pulmonary nodules and hypoattenuating lesions in the liver, highly suspicious for metastatic disease, primary unknown ; discussed findings with daughter , who states that family would not wish to pursue aggressive / invasive work up, but is interested in finding source , in addition would not elect to undergo chemotherapy , would like to focus on comfort and palliation  - Hem oncology consult - f/u on recs. Discussed case with Hem onc team  - Palliative care consult - f/u on recs

## 2020-12-27 NOTE — H&P ADULT - HISTORY OF PRESENT ILLNESS
Patient is an 84 year old female w/[facility]  dementia ( A& O x 1-2), htn, hld, schizophrenia, presenting from [facility] ,  after a fall  on day of admission.     Patient is an 84 year old female w/[facility]  dementia ( A& O x 1-2), htn, hld, schizophrenia, atrial fibrillation on coumadin ,  [facility] ,  after a fall  on day of admission.  Patient cannot recall event and unable to provide specific details. She currently reports no pain.  Per transfer record, patient slipped and fell in dining [place] , the incident was witnessed by a  at the facility , patient sustained minor trauma to the back of head , there was no loss of consciousness at the time.  Patient had no complaints prior to the event.  Per daughter,  patient has had unintentional 15 lb weight loss in 3 months and feels like she is declining in health.    Patient is an 84 year old female w/[facility]  dementia ( A& O x 1-2), htn, hld, schizophrenia, atrial fibrillation on coumadin ,  [facility] ,  after a fall  on day of admission.  Patient cannot recall event and unable to provide specific details. She currently reports no pain.  Per transfer record, patient slipped and fell in dining [place] , the incident was witnessed by a  at the facility , patient sustained minor trauma to the back of head , there was no loss of consciousness at the time.  Patient had no complaints prior to the event.  Per daughter,  patient has had unintentional 40 lb weight loss  over the past few months and feels like she is declining in health. Per daughter patient more sleepy over the last few weeks and complaining of lower back.

## 2020-12-27 NOTE — H&P ADULT - ASSESSMENT
84 year old female w/[facility]  dementia ( A& O x 1-2), htn, hld, schizophrenia, atrial fibrillation on coumadin ,  p/w fall in setting of ongoing weight loss

## 2020-12-27 NOTE — H&P ADULT - PROBLEM SELECTOR PLAN 4
mechanical per history  , CTH w/o bleed , c spine intact   - PT consult   - f/u ekg   - fall precautions

## 2020-12-27 NOTE — PROGRESS NOTE ADULT - PROBLEM SELECTOR PLAN 3
likely pathologic vs2/2  fall in setting of malignancy   - Tylenol prn   - lidocaine patch Likely pathologic vs 2/2  fall in setting of malignancy   - Tylenol prn   - Lidocaine patch

## 2020-12-27 NOTE — H&P ADULT - PROBLEM SELECTOR PLAN 1
CT w/ Numerous solid pulmonary nodules and hypoattenuating lesions in the liver, highly suspicious for metastatic disease, primary unknown    - oncology consult - to be arranged by day team CT w/ Numerous solid pulmonary nodules and hypoattenuating lesions in the liver, highly suspicious for metastatic disease, primary unknown ; discussed findings with daughter , who states that family would not wish to pursue aggressive / invasive work up, but is interested in finding source ,  in addition would not elect to undergo chemotherapy , would like to focus on comfort and palliation ,  - oncology consult - to be arranged by day team  - Palliative care consult - to be arranged by day team

## 2020-12-27 NOTE — PROGRESS NOTE ADULT - PROBLEM SELECTOR PLAN 4
mechanical per history  , CTH w/o bleed , c spine intact   - PT consult   - f/u ekg   - fall precautions Fall - mechanical per history  , CTH w/o bleed , c spine intact   - PT consult   - Fall precautions

## 2020-12-27 NOTE — PROGRESS NOTE ADULT - PROBLEM SELECTOR PLAN 7
- Donepezil  - memantine   - ativan prn for agitation/ anxiety - Continue home medications: Donepezil, Memantine   - Ativan prn for agitation/ anxiety

## 2020-12-27 NOTE — PROGRESS NOTE ADULT - PROBLEM SELECTOR PLAN 8
unclear if on Depakote , will hold for now   - continue Seroquel unclear if on Depakote , will hold for now   - Continue Seroquel

## 2020-12-27 NOTE — H&P ADULT - NSHPPHYSICALEXAM_GEN_ALL_CORE
Vital Signs Last 24 Hrs  T(C): 36.5 (27 Dec 2020 03:52), Max: 36.5 (26 Dec 2020 20:35)  T(F): 97.7 (27 Dec 2020 03:52), Max: 97.7 (26 Dec 2020 20:35)  HR: 90 (27 Dec 2020 03:52) (83 - 103)  BP: 122/67 (27 Dec 2020 03:52) (100/58 - 123/78)  BP(mean): --  RR: 16 (27 Dec 2020 03:52) (14 - 18)  SpO2: 96% (27 Dec 2020 03:52) (96% - 100%) Vital Signs Last 24 Hrs  T(C): 36.5 (27 Dec 2020 03:52), Max: 36.5 (26 Dec 2020 20:35)  T(F): 97.7 (27 Dec 2020 03:52), Max: 97.7 (26 Dec 2020 20:35)  HR: 90 (27 Dec 2020 03:52) (83 - 103)  BP: 122/67 (27 Dec 2020 03:52) (100/58 - 123/78)  BP(mean): --  RR: 16 (27 Dec 2020 03:52) (14 - 18)  SpO2: 96% (27 Dec 2020 03:52) (96% - 100%)    GENERAL: thin, tired appearing , No acute distress, breathing non labored   HEAD:  Atraumatic, small abrasion on left occipital region   ENT: EOMI, PERRLA, conjunctiva and sclera clear,  moist mucosa no pharyngeal erythema or exudates   NECK: supple , no JVD   CHEST/LUNG: Clear to auscultation bilaterally; No wheeze, equal breath sounds bilaterally   BACK: No spinal tenderness,  No CVA tenderness   HEART: Regular rate and rhythm; No murmurs, rubs, or gallops  ABDOMEN: Soft, Nontender, Nondistended; Bowel sounds present  EXTREMITIES:  No clubbing, cyanosis, or edema  MSK: No joint swelling or effusions, ROM intact   PSYCH: Normal behavior/affect  NEUROLOGY: AAOx2, non-focal, cranial nerves intact, moving all extremities   SKIN: Normal color, No rashes or lesions

## 2020-12-27 NOTE — CONSULT NOTE ADULT - SUBJECTIVE AND OBJECTIVE BOX
HPI:  84 year old female w/[facility]  dementia ( A& O x 1-2), htn, hld, schizophrenia, atrial fibrillation on coumadin ,  [facility] ,  after a fall  on day of admission.  Per transfer record, patient slipped and fell in dining viera, the incident was witnessed by a  at the facility, patient sustained minor trauma to the back of head , there was no loss of consciousness at the time.  Patient had no complaints prior to the event.  Per daughter, patient has had unintentional 40 lb weight loss  over the past few months and feels like she is declining in health. Per daughter patient sleepier over the last few weeks and complaining of lower back. Denies numbness, focal weakness, change in vision, headache, fever. Patient reports she is ambulatory, per daughter patient is not cooking and needs help for taking shower.   CT w/ Numerous solid pulmonary nodules and hypoattenuating lesions in the liver, highly suspicious for metastatic disease      REVIEW OF SYSTEMS:  All other review of systems is negative unless indicated above.  Allergies    No Known Allergies    Intolerances        PAST MEDICAL & SURGICAL HISTORY:  Dementia    History of Renal Calculi  no treatment; assymptomatic.    Murmur  MVP    Afib  paroxysmal.  ( 2008).  treated with metoprolol;    HTN (Hypertension)    Hypercholesterolemia    Adult Hypothyroidism  dx:  9/09.  meds X 3 months.  off meds since 12/09.    S/P Colonoscopy  excision &quot;benign&quot; polyps.  ( 2004)    S/P Appendectomy  age 19.        FAMILY HISTORY:  No pertinent family history in first degree relatives        Social History: former smoker          VITAL SIGNS:  Vital Signs Last 24 Hrs  T(C): 36.6 (27 Dec 2020 13:07), Max: 36.6 (27 Dec 2020 13:07)  T(F): 97.9 (27 Dec 2020 13:07), Max: 97.9 (27 Dec 2020 13:07)  HR: 90 (27 Dec 2020 13:07) (83 - 103)  BP: 104/71 (27 Dec 2020 13:07) (100/58 - 123/78)  BP(mean): --  RR: 16 (27 Dec 2020 13:07) (14 - 18)  SpO2: 96% (27 Dec 2020 13:07) (96% - 100%)      PHYSICAL EXAM:     GENERAL: no acute distress, AO x 2 (name, hospital)  RESPIRATORY: LCTAB/L, no rhonchi, rales, or wheezing  CARDIOVASCULAR: RRR,  ABDOMINAL: soft, non-tender, non-distended, positive bowel sounds   EXTREMITIES: no clubbing, cyanosis, or edema  NEUROLOGICAL: alert and oriented x 2, non-focal                          11.0   12.64 )-----------( 240      ( 27 Dec 2020 05:50 )             35.7     12-26    134<L>  |  97  |  23  ----------------------------<  226<H>  4.5   |  24  |  1.05    Ca    10.9<H>      26 Dec 2020 21:38  Phos  2.7     12-27  Mg     1.9     12-27    TPro  7.4  /  Alb  3.4  /  TBili  0.2  /  DBili  x   /  AST  42<H>  /  ALT  19  /  AlkPhos  434<H>  12-26      MEDICATIONS  (STANDING):  amLODIPine   Tablet 5 milliGRAM(s) Oral daily  atorvastatin 20 milliGRAM(s) Oral at bedtime  bisoprolol   Tablet 5 milliGRAM(s) Oral daily  cholecalciferol 1000 Unit(s) Oral daily  cyanocobalamin 1000 MICROGram(s) Oral daily  donepezil 10 milliGRAM(s) Oral at bedtime  influenza   Vaccine 0.5 milliLiter(s) IntraMuscular once  memantine 10 milliGRAM(s) Oral two times a day  multivitamin 1 Tablet(s) Oral daily  pantoprazole    Tablet 40 milliGRAM(s) Oral before breakfast  QUEtiapine 50 milliGRAM(s) Oral three times a day  warfarin 2 milliGRAM(s) Oral once

## 2020-12-27 NOTE — H&P ADULT - PROBLEM SELECTOR PLAN 2
2/2 to malignancy , appears hypovolemic s/p 200cc bolus , will provide additional IV hydration , labs w/ leukocytosis and hypercalcemia  , can be in setting of dehydration , no evidence for infection    - IV NS at 125ml/hr x 500 cc

## 2020-12-27 NOTE — PROGRESS NOTE ADULT - PROBLEM SELECTOR PLAN 2
2/2 to malignancy , appears hypovolemic s/p 200cc bolus , will provide additional IV hydration , labs w/ leukocytosis and hypercalcemia  , can be in setting of dehydration , no evidence for infection    - IV NS at 125ml/hr x 500 cc 2/2 to malignancy , received IVF in ED and on admission , labs w/ leukocytosis and hypercalcemia  , can be in setting of dehydration , no evidence for infection    - IVF NS prn  - Encourage PO intake

## 2020-12-27 NOTE — PROGRESS NOTE ADULT - PROBLEM SELECTOR PLAN 5
- continue warfarin   - monitor INR   - continue bisoprolol - Continue warfarin   - Monitor INR   - Continue bisoprolol

## 2020-12-27 NOTE — H&P ADULT - NSHPSOCIALHISTORY_GEN_ALL_CORE
Lives at [facility]   non smoker    no active  etoh use Lives at [facility]   former smoker, quit 10 yrs ago    no active  etoh use

## 2020-12-27 NOTE — PROGRESS NOTE ADULT - SUBJECTIVE AND OBJECTIVE BOX
[facility] Division of Hospital Medicine  Ashly Moran MD  Pager (M-F, 8A-5P): [phone]  Other Times:  [phone]    Patient is a 85y old  Female who presents with a chief complaint of fall in setting of ongoing weight loss (27 Dec 2020 14:02)      SUBJECTIVE / OVERNIGHT EVENTS:  ADDITIONAL REVIEW OF SYSTEMS:    MEDICATIONS  (STANDING):  amLODIPine   Tablet 5 milliGRAM(s) Oral daily  atorvastatin 20 milliGRAM(s) Oral at bedtime  bisoprolol   Tablet 5 milliGRAM(s) Oral daily  cholecalciferol 1000 Unit(s) Oral daily  cyanocobalamin 1000 MICROGram(s) Oral daily  donepezil 10 milliGRAM(s) Oral at bedtime  influenza   Vaccine 0.5 milliLiter(s) IntraMuscular once  memantine 10 milliGRAM(s) Oral two times a day  multivitamin 1 Tablet(s) Oral daily  pantoprazole    Tablet 40 milliGRAM(s) Oral before breakfast  QUEtiapine 50 milliGRAM(s) Oral three times a day  warfarin 2 milliGRAM(s) Oral once    MEDICATIONS  (PRN):  acetaminophen   Tablet .. 650 milliGRAM(s) Oral every 4 hours PRN Mild Pain (1 - 3)  LORazepam     Tablet 0.5 milliGRAM(s) Oral four times a day PRN Anxiety  senna 2 Tablet(s) Oral at bedtime PRN Constipation      CAPILLARY BLOOD GLUCOSE        I&O's Summary      PHYSICAL EXAM:  Vital Signs Last 24 Hrs  T(C): 36.6 (27 Dec 2020 13:07), Max: 36.6 (27 Dec 2020 13:07)  T(F): 97.9 (27 Dec 2020 13:07), Max: 97.9 (27 Dec 2020 13:07)  HR: 90 (27 Dec 2020 13:07) (83 - 103)  BP: 104/71 (27 Dec 2020 13:07) (100/58 - 123/78)  BP(mean): --  RR: 16 (27 Dec 2020 13:07) (14 - 18)  SpO2: 96% (27 Dec 2020 13:07) (96% - 100%)  CONSTITUTIONAL: NAD, well-developed, well-groomed  EYES: PERRLA; conjunctiva and sclera clear  ENMT: Moist oral mucosa, no pharyngeal injection or exudates; normal dentition  NECK: Supple, no palpable masses; no thyromegaly  RESPIRATORY: Normal respiratory effort; lungs are clear to auscultation bilaterally  CARDIOVASCULAR: Regular rate and rhythm, normal S1 and S2, no murmur/rub/gallop; No lower extremity edema; Peripheral pulses are 2+ bilaterally  ABDOMEN: Nontender to palpation, normoactive bowel sounds, no rebound/guarding; No hepatosplenomegaly  MUSCULOSKELETAL:  Normal gait; no clubbing or cyanosis of digits; no joint swelling or tenderness to palpation  PSYCH: A+O to person, place, and time; affect appropriate  NEUROLOGY: CN 2-12 are intact and symmetric; no gross sensory deficits   SKIN: No rashes; no palpable lesions    LABS:                        11.0   12.64 )-----------( 240      ( 27 Dec 2020 05:50 )             35.7         134<L>  |  97  |  23  ----------------------------<  226<H>  4.5   |  24  |  1.05    Ca    10.9<H>      26 Dec 2020 21:38  Phos  2.7       Mg     1.9         TPro  7.4  /  Alb  3.4  /  TBili  0.2  /  DBili  x   /  AST  42<H>  /  ALT  19  /  AlkPhos  434<H>      PT/INR - ( 26 Dec 2020 21:38 )   PT: 16.5 sec;   INR: 1.40 ratio         PTT - ( 26 Dec 2020 21:38 )  PTT:32.3 sec      Urinalysis Basic - ( 26 Dec 2020 22:57 )    Color: Yellow / Appearance: Slightly Turbid / S.033 / pH: x  Gluc: x / Ketone: Trace  / Bili: Negative / Urobili: 3 mg/dL   Blood: x / Protein: 30 mg/dL / Nitrite: Negative   Leuk Esterase: Negative / RBC: 0 /hpf / WBC 2 /HPF   Sq Epi: x / Non Sq Epi: 4 /hpf / Bacteria: Negative          RADIOLOGY & ADDITIONAL TESTS:  Results Reviewed:   Imaging Personally Reviewed:  Electrocardiogram Personally Reviewed:    COORDINATION OF CARE:  Care Discussed with Consultants/Other Providers [Y/N]:  Prior or Outpatient Records Reviewed [Y/N]:   [facility] Division of Hospital Medicine  Ashly Moran MD  Pager (GERMAN-F, 7O-3P): [phone]  Other Times:  [phone]    Patient is a 85y old  Female who presents with a chief complaint of fall in setting of ongoing weight loss (27 Dec 2020 14:02)      SUBJECTIVE / OVERNIGHT EVENTS:    Overnight, patient was admitted.     VS: afebrile, 80s, 120/65, on RA    Patient was examined this morning. She reports feeling well and no acute complaints. She is AxOx2 - person/name, place, but not to time/date. She recalls having a fall prior to arrival at the hospital but doesn't remember the exact details. She is having some abdominal discomfort. She denies headache, fever, chills, dizziness, chest pain, palpitations, SOB, cough, nausea, dysuria, diarrhea, pain/numbness/weakness in extremities or a new rash.     ADDITIONAL REVIEW OF SYSTEMS: neg    MEDICATIONS  (STANDING):  amLODIPine   Tablet 5 milliGRAM(s) Oral daily  atorvastatin 20 milliGRAM(s) Oral at bedtime  bisoprolol   Tablet 5 milliGRAM(s) Oral daily  cholecalciferol 1000 Unit(s) Oral daily  cyanocobalamin 1000 MICROGram(s) Oral daily  donepezil 10 milliGRAM(s) Oral at bedtime  influenza   Vaccine 0.5 milliLiter(s) IntraMuscular once  memantine 10 milliGRAM(s) Oral two times a day  multivitamin 1 Tablet(s) Oral daily  pantoprazole    Tablet 40 milliGRAM(s) Oral before breakfast  QUEtiapine 50 milliGRAM(s) Oral three times a day  warfarin 2 milliGRAM(s) Oral once    MEDICATIONS  (PRN):  acetaminophen   Tablet .. 650 milliGRAM(s) Oral every 4 hours PRN Mild Pain (1 - 3)  LORazepam     Tablet 0.5 milliGRAM(s) Oral four times a day PRN Anxiety  senna 2 Tablet(s) Oral at bedtime PRN Constipation      CAPILLARY BLOOD GLUCOSE        I&O's Summary      PHYSICAL EXAM:  Vital Signs Last 24 Hrs  T(C): 36.6 (27 Dec 2020 13:07), Max: 36.6 (27 Dec 2020 13:07)  T(F): 97.9 (27 Dec 2020 13:07), Max: 97.9 (27 Dec 2020 13:07)  HR: 90 (27 Dec 2020 13:07) (83 - 103)  BP: 104/71 (27 Dec 2020 13:07) (100/58 - 123/78)  BP(mean): --  RR: 16 (27 Dec 2020 13:07) (14 - 18)  SpO2: 96% (27 Dec 2020 13:07) (96% - 100%)      GENERAL: thin, tired appearing , No acute distress, breathing non labored   HEAD:  Atraumatic, small abrasion on left occipital region   ENT:  EOMI, PERRL, conjunctiva and sclera clear,  moist mucosa no pharyngeal erythema or exudates   NECK: supple , no JVD   CHEST/LUNG: Clear to auscultation bilaterally; No wheeze, equal breath sounds bilaterally   BACK: No spinal tenderness,  No CVA tenderness   HEART: Regular rate and rhythm; No murmurs, rubs, or gallops  ABDOMEN: Soft, mild tenderness in RUQ, Nondistended; Bowel sounds present  EXTREMITIES:  No clubbing, cyanosis, or edema  MSK: No joint swelling or effusions, ROM intact   PSYCH: Normal behavior/affect  NEUROLOGY: AAOx2, non-focal, cranial nerves intact, moving all extremities, strength 5/5 BL upper and lower extremities  SKIN: Normal color, No rashes or ulcers      LABS:                        11.0   12.64 )-----------( 240      ( 27 Dec 2020 05:50 )             35.7     12-    134<L>  |  97  |  23  ----------------------------<  226<H>  4.5   |  24  |  1.05    Ca    10.9<H>      26 Dec 2020 21:38  Phos  2.7     12  Mg     1.9         TPro  7.4  /  Alb  3.4  /  TBili  0.2  /  DBili  x   /  AST  42<H>  /  ALT  19  /  AlkPhos  434<H>  12-26    PT/INR - ( 26 Dec 2020 21:38 )   PT: 16.5 sec;   INR: 1.40 ratio         PTT - ( 26 Dec 2020 21:38 )  PTT:32.3 sec      Urinalysis Basic - ( 26 Dec 2020 22:57 )    Color: Yellow / Appearance: Slightly Turbid / S.033 / pH: x  Gluc: x / Ketone: Trace  / Bili: Negative / Urobili: 3 mg/dL   Blood: x / Protein: 30 mg/dL / Nitrite: Negative   Leuk Esterase: Negative / RBC: 0 /hpf / WBC 2 /HPF   Sq Epi: x / Non Sq Epi: 4 /hpf / Bacteria: Negative          RADIOLOGY & ADDITIONAL TESTS:  Results Reviewed:   Imaging Personally Reviewed:  Electrocardiogram Personally Reviewed:    COORDINATION OF CARE:  Care Discussed with Consultants/Other Providers [Y/N]:  Prior or Outpatient Records Reviewed [Y/N]:

## 2020-12-28 NOTE — PHYSICAL THERAPY INITIAL EVALUATION ADULT - ADDITIONAL COMMENTS
Head CT: No evidence for intracranial hemorrhage, mass effect, or displaced calvarial fracture. Age-related involutional changes and chronic microvascular ischemic changes.  Cervical spine CT: No evidence for acute displaced fracture or traumatic malalignment. Cervical degenerative spondylosis, as described above. Large calcifications at the origins of the internal carotid arteries.

## 2020-12-28 NOTE — CONSULT NOTE ADULT - PROBLEM SELECTOR RECOMMENDATION 3
GAMALIEL completed today, GOC as above  DNR/DNI, no artificial feeding  HCN referral pending PT eval and ability to return to [facility].  D[facility]  >16 minutes spent on ACP

## 2020-12-28 NOTE — BEHAVIORAL HEALTH ASSESSMENT NOTE - HPI (INCLUDE ILLNESS QUALITY, SEVERITY, DURATION, TIMING, CONTEXT, MODIFYING FACTORS, ASSOCIATED SIGNS AND SYMPTOMS)
85 year old female, domiciled at [facility], , retired, past psychiatric hx of schizophrenia, reported prior psychiatric hospitalization, PMHx dementia (A& O x 1-2), HTN, HLD, atrial fibrillation on coumadin,  presenting from her assisted living, after a fall on day of admission.  Per transfer record, patient slipped and fell in dining viera, the incident was witnessed by a  at the facility, patient sustained minor trauma to the back of head , there was no loss of consciousness at the time. CT w/ Numerous solid pulmonary nodules and hypoattenuating lesions in the liver, highly suspicious for metastatic disease.     Psychiatry consulted for decision-making capacity regarding further malignancy work-up. Daughter does not want biopsies or further intervention done, however, pt had expressed to med/oncology team yesterday that she would like to have biopsies done to find source of lesions.     Pt was seen and evaluated at bedside. Pt does not recall speaking to the oncology team yesterday about her lesions in her lungs and livers and told interviewers today that this was the first time she has heard about the lesions. She is able to explain that a biopsy would allow for a sample to be taken to determine treatment options, though unable to recall this conversation with the oncology team prior to today. Pt notes "I have no idea why I'm here." She states that "my daughter is smart enough to make my decisions." Pt is aware that she can intermittently become forgetful.     When team returned to pt's room to ask further questions, she no longer recalled the discussion from a few minutes prior about requiring a biopsy or the reason why she is in the hospital. She denies any current SI/HI/AVH. She does not report feeling depressed. Hx limited due to pt's AMS, baseline dementia.

## 2020-12-28 NOTE — BEHAVIORAL HEALTH ASSESSMENT NOTE - NSBHCHARTREVIEWINVESTIGATE_PSY_A_CORE FT
Ventricular Rate 91 BPM  Atrial Rate 111 BPM  QRS Duration 74 ms  Q-T Interval 344 ms  QTC Calculation(Bazett) 423 ms  R Axis -7 degrees  T Axis 0 degrees  Diagnosis Line ATRIAL FIBRILLATION  ABNORMAL ECG  WHEN COMPARED WITH ECG OF 01-SEP-2020 23:24,  NO SIGNIFICANT CHANGE WAS FOUND    Confirmed by ORLY MCGUIRE ADAM ([id number]) on 12/27/2020 7:55:39 PM

## 2020-12-28 NOTE — CONSULT NOTE ADULT - PROBLEM SELECTOR RECOMMENDATION 4
will sign off as goals are clear. MOLST in chart. primary team updated.  care coordination to help facilitate dispo when medically clear.  [phone] if acute issues

## 2020-12-28 NOTE — CONSULT NOTE ADULT - CONVERSATION DETAILS
Outreach made to daughter Sania, at number in EMR.  Introduced self and role.  Daughter states she understands mother likely has metastatic, incurable cancer, and not interested in pursuing biopsy as she doesn't feel that it would provide much benefit given her mothers other comorbidities and dementia. She has had multiple conversations with patient in the past, when her father (patients ), was dying of colon cancer, and patient had made her wishes known to be DNR/DNI and not suffer.   Daughter feels confident that her mother would not want anything invasive, including biopsy, and would not want chemotherapy. She is interested in de-escalating the "many pharmaceuticals" her mother has been receiving. We discussed deprescribing and daughter on board.  We discussed options for hospice care, potentially at the [facility], depending on patients functional status. Hospice benefit introduced and daughter interested in referral. We discussed MOLST form and completed for DNR/DNI, no feeding tube.  January Lui is surrogate under [facility], she is the only child, patient is .  Emotional support provided and all questions answered.

## 2020-12-28 NOTE — CONSULT NOTE ADULT - PROBLEM SELECTOR RECOMMENDATION 2
advanced but not end stage  on memory care unit at [facility]  does not have capacity for medical decision making

## 2020-12-28 NOTE — CONSULT NOTE ADULT - SUBJECTIVE AND OBJECTIVE BOX
HPI:  Patient is an 84 year old female w/pmh  dementia ( A& O x 1-2), htn, hld, schizophrenia, atrial fibrillation on coumadin ,  presenting from [facility]  assisted living ,  after a fall  on day of admission.  Patient cannot recall event and unable to provide specific details. She currently reports no pain.  Per transfer record, patient slipped and fell in dining viera , the incident was witnessed by a  at the facility , patient sustained minor trauma to the back of head , there was no loss of consciousness at the time.  Patient had no complaints prior to the event.  Per daughter,  patient has had unintentional 40 lb weight loss  over the past few months and feels like she is declining in health. Per daughter patient more sleepy over the last few weeks and complaining of lower back.    (27 Dec 2020 04:12)    PERTINENT PM/SXH:   Dementia    Hypercholesterolemia    History of Renal Calculi    Renal Calculi    Murmur    Afib    HTN (Hypertension)    Hypercholesterolemia    Adult Hypothyroidism      S/P Colonoscopy    S/P Appendectomy      FAMILY HISTORY:  No pertinent family history in first degree relatives      ITEMS NOT CHECKED ARE NOT PRESENT    SOCIAL HISTORY:   Significant other/partner[ ]  Children[ ]  Congregation/Spirituality:  Substance hx:  [ ]   Tobacco hx:  [ ]   Alcohol hx: [ ]   Home Opioid hx:  [ ] I-Stop Reference No:  Living Situation: [ ]Home  [ ]Long term care  [ ]Rehab [ ]Other    ADVANCE DIRECTIVES:    DNR  MOLST  [ ]  Living Will  [ ]   DECISION MAKER(s):  [ ] Health Care Proxy(s)  [ ] Surrogate(s)  [ ] Guardian           Name(s): Phone Number(s):    BASELINE (I)ADL(s) (prior to admission):  [place]: [ ]Total  [ ] Moderate [ ]Dependent    Allergies    No Known Allergies    Intolerances    MEDICATIONS  (STANDING):  amLODIPine   Tablet 5 milliGRAM(s) Oral daily  atorvastatin 20 milliGRAM(s) Oral at bedtime  bisoprolol   Tablet 5 milliGRAM(s) Oral daily  cholecalciferol 1000 Unit(s) Oral daily  cyanocobalamin 1000 MICROGram(s) Oral daily  donepezil 10 milliGRAM(s) Oral at bedtime  influenza   Vaccine 0.5 milliLiter(s) IntraMuscular once  memantine 10 milliGRAM(s) Oral two times a day  multivitamin 1 Tablet(s) Oral daily  pantoprazole    Tablet 40 milliGRAM(s) Oral before breakfast  QUEtiapine 50 milliGRAM(s) Oral three times a day    MEDICATIONS  (PRN):  acetaminophen   Tablet .. 650 milliGRAM(s) Oral every 4 hours PRN Mild Pain (1 - 3)  LORazepam     Tablet 0.5 milliGRAM(s) Oral four times a day PRN Anxiety  senna 2 Tablet(s) Oral at bedtime PRN Constipation    PRESENT SYMPTOMS: [ ]  Due to Covid 19 infection data obtained from nursing assessment.  Source if other than patient:  [ ]Family   [ ]Team     Pain: [ ]yes [ ]no  QOL impact -   Location -                    Aggravating factors -  Quality -  Radiation -  Timing-  Severity (0-10 scale):  Minimal acceptable level (0-10 scale):     CPOT:    https://www.[facility].org/getattachment/[email or web address]/Critical-Care-Pain-Observation-Tool-(CPOT)      PAIN AD Score:     http://geriatrictoolkit.[facility]/cog/painad.pdf (press ctrl +  left click to view)    Dyspnea:                           [ ]Mild [ ]Moderate [ ]Severe  Anxiety:                             [ ]Mild [ ]Moderate [ ]Severe  Fatigue:                             [ ]Mild [ ]Moderate [ ]Severe  Nausea:                             [ ]Mild [ ]Moderate [ ]Severe  Loss of appetite:              [ ]Mild [ ]Moderate [ ]Severe  Constipation:                    [ ]Mild [ ]Moderate [ ]Severe    Other Symptoms:  [ ]All other review of systems negative     Palliative Performance Status Version 2:         %    http://[facility].org/files/news/palliative_performance_scale_ppsv2.pdf  PHYSICAL EXAM: DUE TO COVID-19 INFECTION  physical exam findings from the clinician caring for this patient directly are used.   Vital Signs Last 24 Hrs  T(C): 36.6 (28 Dec 2020 07:48), Max: 37.1 (27 Dec 2020 20:14)  T(F): 97.8 (28 Dec 2020 07:48), Max: 98.8 (27 Dec 2020 20:14)  HR: 107 (28 Dec 2020 07:48) (90 - 116)  BP: 113/73 (28 Dec 2020 07:48) (104/71 - 121/82)  BP(mean): --  RR: 18 (28 Dec 2020 07:48) (16 - 18)  SpO2: 95% (28 Dec 2020 07:48) (95% - 98%) I&O's Summary    GENERAL:  [ ]Alert  [ ]Oriented x   [ ]Lethargic  [ ]Cachexia  [ ]Unarousable  [ ]Verbal  [ ]Non-Verbal  Behavioral:   [ ] Anxiety  [ ] Delirium [ ] Agitation [ ] Other  HEENT:  [ ]Normal   [ ]Dry mouth   [ ]ET Tube/Trach  [ ]Oral lesions  PULMONARY:   [ ]Clear [ ]Tachypnea  [ ]Audible excessive secretions   [ ]Rhonchi        [ ]Right [ ]Left [ ]Bilateral  [ ]Crackles        [ ]Right [ ]Left [ ]Bilateral  [ ]Wheezing     [ ]Right [ ]Left [ ]Bilateral  [ ]Diminished breath sounds [ ]right [ ]left [ ]bilateral  CARDIOVASCULAR:    [ ]Regular [ ]Irregular [ ]Tachy  [ ]Everett [ ]Murmur [ ]Other  GASTROINTESTINAL:  [ ]Soft  [ ]Distended   [ ]+BS  [ ]Non tender [ ]Tender  [ ]PEG [ ]OGT/ NGT  Last BM:   GENITOURINARY:  [ ]Normal [ ] Incontinent   [ ]Oliguria/Anuria   [ ]Ryan  MUSCULOSKELETAL:   [ ]Normal   [ ]Weakness  [ ]Bed/Wheelchair bound [ ]Edema  NEUROLOGIC:   [ ]No focal deficits  [ ]Cognitive impairment  [ ]Dysphagia [ ]Dysarthria [ ]Paresis [ ]Other   SKIN:   [ ]Normal    [ ]Rash  [ ]Pressure ulcer(s)       Present on admission [ ]y [ ]n    CRITICAL CARE:  [ ] Shock Present  [ ]Septic [ ]Cardiogenic [ ]Neurologic [ ]Hypovolemic  [ ]  Vasopressors [ ]  Inotropes   [ ]Respiratory failure present [ ]Mechanical ventilation [ ]Non-invasive ventilatory support [ ]High flow     [ ]Acute  [ ]Chronic [ ]Hypoxic  [ ]Hypercarbic [ ]Other  [ ]Other organ failure     LABS:                        11.9   12.80 )-----------( 280      ( 28 Dec 2020 06:02 )             38.2   12-    137  |  100  |  16  ----------------------------<  134<H>  4.5   |  25  |  0.86    Ca    10.7<H>      27 Dec 2020 20:05  Phos  2.7     12  Mg     1.9         TPro  7.0  /  Alb  3.4  /  TBili  0.4  /  DBili  x   /  AST  36  /  ALT  18  /  AlkPhos  408<H>    PT/INR - ( 27 Dec 2020 20:06 )   PT: 15.8 sec;   INR: 1.33 ratio         PTT - ( 27 Dec 2020 20:06 )  PTT:31.3 sec    Urinalysis Basic - ( 26 Dec 2020 22:57 )    Color: Yellow / Appearance: Slightly Turbid / S.033 / pH: x  Gluc: x / Ketone: Trace  / Bili: Negative / Urobili: 3 mg/dL   Blood: x / Protein: 30 mg/dL / Nitrite: Negative   Leuk Esterase: Negative / RBC: 0 /hpf / WBC 2 /HPF   Sq Epi: x / Non Sq Epi: 4 /hpf / Bacteria: Negative              RADIOLOGY & ADDITIONAL STUDIES:    PROTEIN CALORIE MALNUTRITION PRESENT: [ ]mild [ ]moderate [ ]severe [ ]underweight [ ]morbid obesity  https://www.andeal.org/vault/2440/web/files/ONC/Table_Clinical%20Characteristics%20to%20Document%20Malnutrition-White%20JV%20et%20al%435866.pdf    Height (cm): 157.5 (20 @ 19:48), 157.48 (20 @ 22:00), 157.48 (20 @ 16:11)  Weight (kg): 49.9 (20 @ 19:48), 56.7 (20 @ 22:00), 54.9 (20 @ 16:11)  BMI (kg/m2): 20.1 (20 @ 19:48), 22.9 (20 @ 22:00), 22.1 (20 @ 16:11)    [ ]PPSV2 < or = to 30% [ ]significant weight loss  [ ]poor nutritional intake  [ ]anasarca     Albumin, Serum: 3.4 g/dL (20 @ 20:05)   [ ]Artificial Nutrition      REFERRALS:   [ ]Chaplaincy  [ ]Hospice  [ ]Child Life  [ ]Social Work  [ ]Case management [ ]Holistic Therapy     Goals of Care Document:  HPI:  Patient is an 84 year old female w/pmh  dementia ( A& O x 1-2), htn, hld, schizophrenia, atrial fibrillation on coumadin ,  presenting from [facility]  assisted living ,  after a fall  on day of admission.  Patient cannot recall event and unable to provide specific details. She currently reports no pain.  Per transfer record, patient slipped and fell in dining viera , the incident was witnessed by a  at the facility , patient sustained minor trauma to the back of head , there was no loss of consciousness at the time.  Patient had no complaints prior to the event.  Per daughter,  patient has had unintentional 40 lb weight loss  over the past few months and feels like she is declining in health. Per daughter patient more sleepy over the last few weeks and complaining of lower back.      (27 Dec 2020 04:12)    Palliative consulted for GOC. Seen today by behavioral health, deemed to not have capacity.  Outreach made to daughter Sania, who is surrogate under [facility]. See GOC note embedded below.  Patient awake/alert but not able to participate fully in conversation. Denies pain at time of eval.    PERTINENT PM/SXH:   Dementia    Hypercholesterolemia    History of Renal Calculi    Renal Calculi    Murmur    Afib    HTN (Hypertension)    Hypercholesterolemia    Adult Hypothyroidism      S/P Colonoscopy    S/P Appendectomy      FAMILY HISTORY:  No pertinent family history in first degree relatives      ITEMS NOT CHECKED ARE NOT PRESENT    SOCIAL HISTORY:   Significant other/partner[ ]  Children[x ]  Orthodox/Spirituality:  Substance hx:  [ ]   Tobacco hx:  [ ]   Alcohol hx: [ ]   Home Opioid hx:  [ ] I-Stop Reference No:  Living Situation: [ ]Home  [ ]Long term care  [ ]Rehab [x ]Other- Assisted living    ADVANCE DIRECTIVES:    DNR  MOLST  [ ]  Living Will  [ ]   DECISION MAKER(s):  [ ] Health Care Proxy(s)  [x ] Surrogate(s)  [ ] Guardian           Name(s): Phone Number(s): tereso Lui [facility]    BASELINE (I)ADL(s) (prior to admission):  [place]: [ ]Total  [ x] Moderate [ ]Dependent    Allergies    No Known Allergies    Intolerances    MEDICATIONS  (STANDING):  amLODIPine   Tablet 5 milliGRAM(s) Oral daily  atorvastatin 20 milliGRAM(s) Oral at bedtime  bisoprolol   Tablet 5 milliGRAM(s) Oral daily  cholecalciferol 1000 Unit(s) Oral daily  cyanocobalamin 1000 MICROGram(s) Oral daily  donepezil 10 milliGRAM(s) Oral at bedtime  influenza   Vaccine 0.5 milliLiter(s) IntraMuscular once  memantine 10 milliGRAM(s) Oral two times a day  multivitamin 1 Tablet(s) Oral daily  pantoprazole    Tablet 40 milliGRAM(s) Oral before breakfast  QUEtiapine 50 milliGRAM(s) Oral three times a day    MEDICATIONS  (PRN):  acetaminophen   Tablet .. 650 milliGRAM(s) Oral every 4 hours PRN Mild Pain (1 - 3)  LORazepam     Tablet 0.5 milliGRAM(s) Oral four times a day PRN Anxiety  senna 2 Tablet(s) Oral at bedtime PRN Constipation    PRESENT SYMPTOMS: [ ]  Due to Covid 19 infection data obtained from nursing assessment.  Source if other than patient:  [ ]Family   [ ]Team     Pain: [ ]yes [x ]no  QOL impact -   Location -                    Aggravating factors -  Quality -  Radiation -  Timing-  Severity (0-10 scale):  Minimal acceptable level (0-10 scale):     CPOT:    https://www.[facility].org/getattachment/[email or web address]/Critical-Care-Pain-Observation-Tool-(CPOT)      PAIN AD Score:     http://geriatrictoolkit.[place].[facility]/cog/painad.pdf (press ctrl +  left click to view)    Dyspnea:                           [ ]Mild [ ]Moderate [ ]Severe  Anxiety:                             [ ]Mild [ ]Moderate [ ]Severe  Fatigue:                             [ ]Mild [ ]Moderate [ ]Severe  Nausea:                             [ ]Mild [ ]Moderate [ ]Severe  Loss of appetite:              [ ]Mild [ ]Moderate [ ]Severe  Constipation:                    [ ]Mild [ ]Moderate [ ]Severe    Other Symptoms:  [ x]All other review of systems negative     Palliative Performance Status Version 2:        30 %    http://npcrc.org/files/news/palliative_performance_scale_ppsv2.pdf  PHYSICAL EXAM: DUE TO COVID-19 INFECTION  physical exam findings from the clinician caring for this patient directly are used.   Vital Signs Last 24 Hrs  T(C): 36.6 (28 Dec 2020 07:48), Max: 37.1 (27 Dec 2020 20:14)  T(F): 97.8 (28 Dec 2020 07:48), Max: 98.8 (27 Dec 2020 20:14)  HR: 107 (28 Dec 2020 07:48) (90 - 116)  BP: 113/73 (28 Dec 2020 07:48) (104/71 - 121/82)  BP(mean): --  RR: 18 (28 Dec 2020 07:48) (16 - 18)  SpO2: 95% (28 Dec 2020 07:48) (95% - 98%) I&O's Summary    GENERAL:  [x ]Alert  [x ]Oriented x1-2   [ ]Lethargic  [ ]Cachexia  [ ]Unarousable  [ ]Verbal  [ ]Non-Verbal  Behavioral:   [ ] Anxiety  [ ] Delirium [ ] Agitation [ ] Other  HEENT:  [ ]Normal   [x ]Dry mouth   [ ]ET Tube/Trach  [ ]Oral lesions  PULMONARY:   [x ]Clear [ ]Tachypnea  [ ]Audible excessive secretions   [ ]Rhonchi        [ ]Right [ ]Left [ ]Bilateral  [ ]Crackles        [ ]Right [ ]Left [ ]Bilateral  [ ]Wheezing     [ ]Right [ ]Left [ ]Bilateral  [ ]Diminished breath sounds [ ]right [ ]left [ ]bilateral  CARDIOVASCULAR:    [ ]Regular [x ]Irregular [ ]Tachy  [ ]Everett [ ]Murmur [ ]Other  GASTROINTESTINAL:  [ x]Soft  [ ]Distended   [ ]+BS  [x ]Non tender [ ]Tender  [ ]PEG [ ]OGT/ NGT  Last BM:   GENITOURINARY:  [ ]Normal [ x] Incontinent   [ ]Oliguria/Anuria   [ ]Ryan  MUSCULOSKELETAL:   [ ]Normal   [ x]Weakness  [ ]Bed/Wheelchair bound [ ]Edema  NEUROLOGIC:   [ ]No focal deficits  [ x]Cognitive impairment  [ ]Dysphagia [ ]Dysarthria [ ]Paresis [ ]Other   SKIN: ecchymoses  [ ]Normal    [ ]Rash  [ ]Pressure ulcer(s)       Present on admission [ ]y [ ]n    CRITICAL CARE:  [ ] Shock Present  [ ]Septic [ ]Cardiogenic [ ]Neurologic [ ]Hypovolemic  [ ]  Vasopressors [ ]  Inotropes   [ ]Respiratory failure present [ ]Mechanical ventilation [ ]Non-invasive ventilatory support [ ]High flow     [ ]Acute  [ ]Chronic [ ]Hypoxic  [ ]Hypercarbic [ ]Other  [ ]Other organ failure     LABS:                        11.9   12.80 )-----------( 280      ( 28 Dec 2020 06:02 )             38.2       137  |  100  |  16  ----------------------------<  134<H>  4.5   |  25  |  0.86    Ca    10.7<H>      27 Dec 2020 20:05  Phos  2.7       Mg     1.9         TPro  7.0  /  Alb  3.4  /  TBili  0.4  /  DBili  x   /  AST  36  /  ALT  18  /  AlkPhos  408<H>    PT/INR - ( 27 Dec 2020 20:06 )   PT: 15.8 sec;   INR: 1.33 ratio         PTT - ( 27 Dec 2020 20:06 )  PTT:31.3 sec    Urinalysis Basic - ( 26 Dec 2020 22:57 )    Color: Yellow / Appearance: Slightly Turbid / S.033 / pH: x  Gluc: x / Ketone: Trace  / Bili: Negative / Urobili: 3 mg/dL   Blood: x / Protein: 30 mg/dL / Nitrite: Negative   Leuk Esterase: Negative / RBC: 0 /hpf / WBC 2 /HPF   Sq Epi: x / Non Sq Epi: 4 /hpf / Bacteria: Negative    RADIOLOGY & ADDITIONAL STUDIES:  < from: CT Abdomen and Pelvis w/ IV Cont (20 @ 00:58) >    EXAM:  CT ABDOMEN AND PELVIS IC                          EXAM:  CT CHEST IC                            PROCEDURE DATE:  2020            INTERPRETATION:  CLINICAL INFORMATION: Weight loss, assess for malignancy.  COMPARISON: None.  PROCEDURE:  CT of the Chest was performed with intravenous contrast.  40 ml of Omnipaque 350 was injected intravenously. 10 ml were discarded.  Sagittal and coronal reformats were performed.    FINDINGS:  LUNGS AND PLEURA: Patent central airways.  Numerous nodules noted bilaterally, the largest of which is in the right lower lobe measuring 1.2 x 1.1 cm (2:33). No pleural effusions.  MEDIASTINUM AND TESFAYE: No lymphadenopathy.  VESSELS: Severe atherosclerosis.  HEART: Heart size is normal.  CHEST WALL AND LOWERNECK: Hypodense nodule in the right lobe of thyroid measuring 0.4 x 0.6 cm.    LIVER: Innumerable hypoattenuating lesions in the liver.  GALLBLADDER: Cholecystectomy.  COMMON BILE DUCT: Within normal limits.  PANCREAS: Within normal limits.  SPLEEN: Within normal limits.  ADRENALS: Within normal limits.  KIDNEYS/URETERS: No hydronephrosis or calculus.    BLADDER: Evaluation limited by metallic streak artifact.  REPRODUCTIVE ORGANS: Evaluation of the uterus and adnexa is limited by metallic streakartifact.    BOWEL: Diverticulosis coli. No evidence of diverticulitis. No bowel obstruction. Appendix is not well visualized.  PERITONEUM: Trace ascites.  VESSELS: Severe atherosclerotic changes.  ABDOMINAL WALL: Within normal limits  RETROPERITONEUM/LYMPH NODES: No enlarged lymph nodes.  BONES: Degenerative changes. Mild upper endplate compression fractures at T11 and T9, of indeterminate age.    IMPRESSION:  Numerous solid pulmonary nodules and hypoattenuating lesions in the liver, highly suspicious for metastatic disease. The primary source is indeterminate. Mild upper endplate compression fractures at T11 and T9, of indeterminate age.      TONY GODINEZ MD; Resident Radiology  This document has been electronically signed.  ESTIVEN HALL; Attending Radiologist  This document has been electronically signed. Dec 27 2020  1:32AM    < end of copied text >      PROTEIN CALORIE MALNUTRITION PRESENT: [ ]mild [x ]moderate [ ]severe [ ]underweight [ ]morbid obesity  https://www.andeal.org/vault/2440/web/files/ONC/Table_Clinical%20Characteristics%20to%20Document%20Malnutrition-White%20JV%20et%20al%129539.pdf    Height (cm): 157.5 (20 @ 19:48), 157.48 (20 @ 22:00), 157.48 (20 @ 16:11)  Weight (kg): 49.9 (20 @ 19:48), 56.7 (20 @ 22:00), 54.9 (20 @ 16:11)  BMI (kg/m2): 20.1 (20 @ 19:48), 22.9 (20 @ 22:00), 22.1 (20 @ 16:11)    [x ]PPSV2 < or = to 30% [ ]significant weight loss  [ x]poor nutritional intake  [ ]anasarca     Albumin, Serum: 3.4 g/dL (20 @ 20:05)   [ ]Artificial Nutrition      REFERRALS:   [ ]Chaplaincy  [ ]Hospice  [ ]Child Life  [ ]Social Work  [x ]Case management [ ]Holistic Therapy     Goals of Care Document:

## 2020-12-28 NOTE — PHYSICAL THERAPY INITIAL EVALUATION ADULT - PERTINENT HX OF CURRENT PROBLEM, REHAB EVAL
PMHx: dementia, htn, hld, schizophrenia, a-fib. on day of admission, pt slipped & fell in dining viera (witnessed) & sustained minor trauma to the back of head, no LOC. Pt had no complaints prior to event. pt cannot recall event. Per daughter, +unintentional 40lb wt loss over the past few months. CT: T9 & T11 compression fx's of indeterminate age, Numerous solid pulmonary nodules & hypoattenuating lesions in the liver, highly suspicious for metastatic disease

## 2020-12-28 NOTE — BEHAVIORAL HEALTH ASSESSMENT NOTE - NSBHCHARTREVIEWVS_PSY_A_CORE FT
Vital Signs Last 24 Hrs  T(C): 36.5 (28 Dec 2020 12:09), Max: 37.1 (27 Dec 2020 20:14)  T(F): 97.7 (28 Dec 2020 12:09), Max: 98.8 (27 Dec 2020 20:14)  HR: 100 (28 Dec 2020 12:09) (90 - 116)  BP: 109/66 (28 Dec 2020 12:09) (104/71 - 121/82)  BP(mean): --  RR: 18 (28 Dec 2020 12:09) (16 - 18)  SpO2: 95% (28 Dec 2020 12:09) (95% - 98%)

## 2020-12-28 NOTE — PROGRESS NOTE ADULT - PROBLEM/PLAN-2
[facility] Progress Note  Patient Name: Brinda Valenzuela   YOB: 1963   Medical Record Number: [id number]       Attending Physician: Janae Croft M.D. Date of Visit: 7/2/20    Chief Complaint:  Patient presents with:   Follow recurrence and increase in LAD occurred in 4/2015. She tolerated 3 cycles of FCR well, but the 4th was tolerated poorly with extreme fatigue, malaise and protracted neutropenia. Treatment was discontinued after 4 cycles.     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 Shotty B/L anterior cervical LAD. Respiratory Normal - Lungs are clear to auscultation, no wheezing. Cardiovascular Normal - Regular rate and rhythm, no murmurs.    Abdomen Normal - Non-tender, non-dis PROTEIN Latest Ref Range: 6.4 - 8.2 g/dL 7.0   Albumin Latest Ref Range: 3.4 - 5.0 g/dL 3.4   Patient Fasting?  Unknown No   WBC Latest Ref Range: 4.0 - 11.0 x10(3) uL 12.0 (H)   Hemoglobin Latest Ref Range: 12.0 - 16.0 g/dL 9.1 (L)   Hematocrit Latest Ref Vanetoclax, but has developed neutropenia and thrombocytopenia. Will pause the titration. Hold Venetoclax until counts recover. Neutropenic precautions. Check Labs twice weekly.     Fibrocystic breast disease: Mammogram and U/S due in August.      Planned DISPLAY PLAN FREE TEXT

## 2020-12-28 NOTE — PHYSICAL THERAPY INITIAL EVALUATION ADULT - LIVES WITH, PROFILE
Pt reports she lives alone at [facility] where she is able to ambulate independently with and without RW, and requires assist for ADL's/alone

## 2020-12-28 NOTE — BEHAVIORAL HEALTH ASSESSMENT NOTE - SUMMARY
85 year old female, domiciled at [facility], , retired, past psychiatric hx of schizophrenia, reported prior psychiatric hospitalization, PMHx dementia (A& O x 1-2), HTN, HLD, atrial fibrillation on coumadin,  presenting from her assisted living, after a fall on day of admission.  Per transfer record, patient slipped and fell in dining viera, the incident was witnessed by a  at the facility, patient sustained minor trauma to the back of head , there was no loss of consciousness at the time.  CT w/ Numerous solid pulmonary nodules and hypoattenuating lesions in the liver, highly suspicious for metastatic disease. Psychiatry consulted for decision-making capacity regarding biopsies and further work-up of lesions seen on imaging. Patient unable to recall information about her current diagnosis, interventions, and treatment options that were explained to her by the onc team yesterday. At this time, patient currently does not have decision-making capacity to consent for biopsies or other interventions for lung and liver lesions seen on imaging. Further decisions about patient's care should be made with patient's HCP. 85 year old female, domiciled at [facility], , retired, past psychiatric hx of schizophrenia, reported prior psychiatric hospitalization, PMHx dementia (A& O x 1-2), HTN, HLD, atrial fibrillation on coumadin,  presenting from her assisted living, after a fall on day of admission.  Per transfer record, patient slipped and fell in dining viera, the incident was witnessed by a  at the facility, patient sustained minor trauma to the back of head , there was no loss of consciousness at the time.  CT w/ Numerous solid pulmonary nodules and hypoattenuating lesions in the liver, highly suspicious for metastatic disease. Psychiatry consulted for decision-making capacity regarding biopsies and further work-up of lesions seen on imaging. Patient unable to recall information about her current diagnosis, interventions, and treatment options that were explained to her by the onc team yesterday. She is unable to reason about different treatment options and unable to fully communicate a choice, often fluctuating between saying she can make a decision vs. deferring to her daughter for all decisions. At this time, patient currently does not have decision-making capacity to consent for biopsies or other interventions for lung and liver lesions seen on imaging.  Further decisions about patient's care should be made with patient's HCP.

## 2020-12-28 NOTE — CONSULT NOTE ADULT - ASSESSMENT
84 year old female w/pmh  dementia ( A& O x 1-2), htn, hld, schizophrenia, atrial fibrillation on coumadin, presented after witnessed fall. CT w/ Numerous solid pulmonary nodules and hypoattenuating lesions in the liver, highly suspicious for metastatic disease.    #witnessed fall  -Head CT: No evidence for intracranial hemorrhage, mass effect, or displaced calvarial fracture. Age-related involutional changes and chronic microvascular ischemic changes.  -Cervical spine CT: No evidence for acute displaced fracture or traumatic malalignment. Cervical degenerative spondylosis, as described above. Large calcifications at the origins of the internal carotid arteries.    #Failure to thrive   -2/2 to malignancy, received IVF in ED and on admission, labs w/ leukocytosis and hypercalcemia, can be in setting of dehydration or malignancy  - IVF NS prn  - Encourage PO intake  -Appreciate palliative recs      #cancer of unknown primary  -CT C/A/P: Numerous solid pulmonary nodules and hypoattenuating lesions in the liver, highly suspicious for metastatic disease. The primary source is indeterminate. Mild upper endplate compression fractures at T11 and T9, of indeterminate age.  -please obtain collateral history regarding patient last mammogram and colonoscopy  -Please check CEA, CA 19-9, CA15.3  -Patient is AO x 2, she reports she is willing to go for biopsy to find out about the diagnosis, after I talked to her daughter Veronica [phone]), the daughter does not want her mother to go for any invasive diagnostic procedure   -Please consult psyche to assess capacity of making decision as patient wishes are different from daughter's.  -origin of tumor can be from breast vs lung or GI. Will need IR guided biopsy to decide about tumor origin and treatment plan.  -Please consult palliative care for C discussion  -Oncology will follow      Yamini John PGY5  Heme/Onc fellow  [phone]  
84 year old female w/pmh  dementia ( A& O x 1-2), htn, hld, schizophrenia, atrial fibrillation on coumadin ,  presenting from [facility]  assisted living ,  after a fall  on day of admission. CT showing multiple nodules suspicious for metastatic cancer. Palliative consulted for GOC

## 2020-12-28 NOTE — BEHAVIORAL HEALTH ASSESSMENT NOTE - NSBHREFERDETAILS_PSY_A_CORE_FT
assessment of capacity. Seen by med/onc requesting biopsies, but daughter does not wish to pursue further interventions

## 2020-12-28 NOTE — BEHAVIORAL HEALTH ASSESSMENT NOTE - RISK ASSESSMENT
Risk factors: hx of mental health issues  Protective factors: no current SI/HI, plan, no known hx of attempts Low Acute Suicide Risk

## 2020-12-28 NOTE — PROGRESS NOTE ADULT - PROBLEM SELECTOR PLAN 1
CT w/ Numerous solid pulmonary nodules and hypoattenuating lesions in the liver, highly suspicious for metastatic disease, primary unknown   -per psych patient does not have capacity to make decisions regarding biopsy or team  -palliative, heme onc discussed findings with daughter, who states that family would not wish to pursue aggressive / invasive work up, chemo, wants to focus comfort and palliation  -trying to set up hospice at [facility] if allowed  -palliative, psych, and heme onc following and assisting with dispo plans

## 2020-12-28 NOTE — BEHAVIORAL HEALTH ASSESSMENT NOTE - CASE SUMMARY
85F domiciled in assisted living, , retired, PPhx schizophrenia and dementia, remote prior psychiatric hospitalization, no hx substance use, PMH of Afib on coumadin, HTN, HLD admitted s/p fall, found to have multiple lesions in lung/liver c/f mets, psychiatry consulted to evaluate pt's capacity to make decisions regarding biopsy.  Met with pt and Liliana NP from primary team; pt does not recall being told she has lesions, does not recall speaking with onc yesterday regarding workup including biopsy.  Unable to make a decision about biopsy; would like to defer to her daughter.  Pt unable to demonstrate understanding of her condition, unable to provide reasoning.  Upon second attempt to discuss, pt unable to remember what we discussed a few minutes prior. AOx1, calm, denies acute psychotic sx, denies mood changes, denies SI/HI.  Dx: Dementia, Schizophrenia.  Recs: 1. Pt does not have capacity to make decisions regarding biopsy/cancer workup, defer to HCP.  2. C/w home dose Seroquel.  3. PRN: Zyprexa 1.25mg-2.5mg PO/IM q6h PRN agitation, monitor EKG for QTc<500.  4. Check TSH, B12, folate, RPR.  CL psych will f/u

## 2020-12-28 NOTE — BEHAVIORAL HEALTH ASSESSMENT NOTE - NSBHCONSULTRECOMMENDOTHER_PSY_A_CORE FT
At this time, patient does not have decision-making capacity to consent to biopsies or further workup for the lesions seen on imaging. She is unable to clearly communicate a choice and understand and recall the relevant information about the CT findings and interventions required. All decisions should be discussed with patient's HCP. As stated above, patient is unable to reason about different intervention options and unable to fully communicate a choice, often fluctuating between saying she can make a decision vs. deferring to her daughter for all decisions regarding her care.  She is unable to recall information about her current diagnosis, interventions, and treatment options that were explained to her by the onc team yesterday. At this time, patient currently does not have decision-making capacity to consent for biopsies or other interventions for lung and liver lesions seen on imaging.

## 2020-12-28 NOTE — BEHAVIORAL HEALTH ASSESSMENT NOTE - NSBHCONSULTMEDS_PSY_A_CORE FT
c/w home dose Seroquel, monitor EKG for QTc<500    Melatonin 3mg PO qhS PRN insomnia    Check TSH, B12, Folate, RPR

## 2020-12-28 NOTE — CONSULT NOTE ADULT - PROBLEM SELECTOR RECOMMENDATION 9
CT reviewed, daughter has spoken with oncology  not interested in biopsy or DMT  interested in symptom focused approach-> hospice referral pending PT eval and ability to return to [facility].

## 2020-12-28 NOTE — BEHAVIORAL HEALTH ASSESSMENT NOTE - NSBHCONSULTFOLLOWAFTERCARE_PSY_A_CORE FT
OP psych f/u at [facility]- [phone]  [facility] clinic- [phone]

## 2020-12-28 NOTE — PROGRESS NOTE ADULT - PROBLEM SELECTOR PLAN 2
2/2 to malignancy , received IVF in ED and on admission , labs w/ leukocytosis and hypercalcemia  , can be in setting of dehydration , no evidence for infection    - IVF NS prn  - Encourage PO intake

## 2020-12-28 NOTE — CHART NOTE - NSCHARTNOTEFT_GEN_A_CORE
I talked to patient daughter (Veronica [phone]), patient does not have capacity to make decision per behavioral team, I explained all possible reasons of metastatic lesions, can be from breast or lung or GI origin, the daughter again told me she does not want her mother to undergo any invasive diagnostic procedure including biopsy.    Yamini Mendoza PGY5  Oncology fellow  [phone]

## 2020-12-28 NOTE — BEHAVIORAL HEALTH ASSESSMENT NOTE - NSBHCHARTREVIEWIMAGING_PSY_A_CORE FT
CT ABD/PELVIS w/ IV Cont:  IMPRESSION:  Numerous solid pulmonary nodules and hypoattenuating lesions in the liver, highly suspicious for metastatic disease. The primary source is indeterminate. Mild upper endplate compression fractures at T11 and T9, of indeterminate age.    CT Head:  IMPRESSION:  Head CT: No evidence for intracranial hemorrhage, mass effect, or displaced calvarial fracture. Age-related involutional changes and chronic microvascular ischemic changes.    Cervical spine CT: No evidence for acute displaced fracture or traumatic malalignment. Cervical degenerative spondylosis, as describedabove. Large calcifications at the origins of the internal carotid arteries.

## 2020-12-28 NOTE — PROGRESS NOTE ADULT - PROBLEM SELECTOR PLAN 4
Fall - mechanical per history  , CTH w/o bleed , c spine intact   - PT consult pending  - Fall precautions  - given VTE risk in setting of suspect malignancy and afib B>R of continuing a/c in setting of falls per discussion with daughter, r/b/a discussed

## 2020-12-28 NOTE — PROGRESS NOTE ADULT - SUBJECTIVE AND OBJECTIVE BOX
Patient is a 85y old  Female who presents with a chief complaint of fall in setting of ongoing weight loss (28 Dec 2020 11:59)      SUBJECTIVE / OVERNIGHT EVENTS: No overnight events. Denies pain anywhere, cp, sob, palpitations.      ADDITIONAL REVIEW OF SYSTEMS: Negative except for above    MEDICATIONS  (STANDING):  amLODIPine   Tablet 5 milliGRAM(s) Oral daily  bisoprolol   Tablet 5 milliGRAM(s) Oral daily  cholecalciferol 1000 Unit(s) Oral daily  cyanocobalamin 1000 MICROGram(s) Oral daily  donepezil 10 milliGRAM(s) Oral at bedtime  influenza   Vaccine 0.5 milliLiter(s) IntraMuscular once  memantine 10 milliGRAM(s) Oral two times a day  multivitamin 1 Tablet(s) Oral daily  pantoprazole    Tablet 40 milliGRAM(s) Oral before breakfast  QUEtiapine 50 milliGRAM(s) Oral three times a day    MEDICATIONS  (PRN):  acetaminophen   Tablet .. 650 milliGRAM(s) Oral every 4 hours PRN Mild Pain (1 - 3)  LORazepam     Tablet 0.5 milliGRAM(s) Oral four times a day PRN Anxiety  senna 2 Tablet(s) Oral at bedtime PRN Constipation      CAPILLARY BLOOD GLUCOSE        I&O's Summary    28 Dec 2020 07:01  -  28 Dec 2020 13:49  --------------------------------------------------------  IN: 240 mL / OUT: 0 mL / NET: 240 mL        PHYSICAL EXAM:  Vital Signs Last 24 Hrs  T(C): 36.5 (28 Dec 2020 12:09), Max: 37.1 (27 Dec 2020 20:14)  T(F): 97.7 (28 Dec 2020 12:09), Max: 98.8 (27 Dec 2020 20:14)  HR: 100 (28 Dec 2020 12:09) (100 - 116)  BP: 109/66 (28 Dec 2020 12:09) (109/66 - 121/82)  BP(mean): --  RR: 18 (28 Dec 2020 12:09) (18 - 18)  SpO2: 95% (28 Dec 2020 12:09) (95% - 98%)    PHYSICAL EXAM:  GENERAL: NAD, well-developed  HEAD:  Atraumatic, Normocephalic  NECK: Supple, No JVD  CHEST/LUNG: Clear to auscultation bilaterally; No wheeze  HEART: Regular rate and rhythm; No murmurs, rubs, or gallops  ABDOMEN: Soft, Nontender, Nondistended; Bowel sounds present  EXTREMITIES:  2+ Peripheral Pulses, No clubbing, cyanosis, or edema  PSYCH: AAOx2-, confused at times  NEUROLOGY: non-focal  SKIN: No rashes or lesions      LABS:                        11.9   12.80 )-----------( 280      ( 28 Dec 2020 06:02 )             38.2         137  |  100  |  16  ----------------------------<  134<H>  4.5   |  25  |  0.86    Ca    10.7<H>      27 Dec 2020 20:05  Phos  2.7       Mg     1.9         TPro  7.0  /  Alb  3.4  /  TBili  0.4  /  DBili  x   /  AST  36  /  ALT  18  /  AlkPhos  408<H>      PT/INR - ( 27 Dec 2020 20:06 )   PT: 15.8 sec;   INR: 1.33 ratio         PTT - ( 27 Dec 2020 20:06 )  PTT:31.3 sec      Urinalysis Basic - ( 26 Dec 2020 22:57 )    Color: Yellow / Appearance: Slightly Turbid / S.033 / pH: x  Gluc: x / Ketone: Trace  / Bili: Negative / Urobili: 3 mg/dL   Blood: x / Protein: 30 mg/dL / Nitrite: Negative   Leuk Esterase: Negative / RBC: 0 /hpf / WBC 2 /HPF   Sq Epi: x / Non Sq Epi: 4 /hpf / Bacteria: Negative        Culture - Urine (collected 27 Dec 2020 01:19)  Source: .Urine Clean Catch (Midstream)  Final Report (27 Dec 2020 20:43):    <10,000 CFU/mL Normal Urogenital Manuela        RADIOLOGY & ADDITIONAL TESTS:    Imaging Personally Reviewed:    Electrocardiogram Personally Reviewed:    COORDINATION OF CARE:  Care Discussed with Consultants/Other Providers [Y/N]:  Prior or Outpatient Records Reviewed [Y/N]:

## 2020-12-28 NOTE — PROGRESS NOTE ADULT - PROBLEM SELECTOR PLAN 3
Likely pathologic vs 2/2  fall in setting of malignancy, no pain and neuro deficiets  - Tylenol prn   - Lidocaine patch

## 2020-12-29 NOTE — DISCHARGE NOTE PROVIDER - HOSPITAL COURSE
84 year old female w/pmh  dementia ( A& O x 1-2), htn, hld, schizophrenia, atrial fibrillation on coumadin ,  p/w fall in setting of ongoing weight loss with numerous lung/liver lesions for malignancy forgoing workup opting for hospice      Problem/Plan - 1:  ·  Problem: Metastatic cancer.  Plan: CT w/ Numerous solid pulmonary nodules and hypoattenuating lesions in the liver, highly suspicious for metastatic disease, primary unknown   -per psych patient does not have capacity to make decisions regarding biopsy or team  -personally discussed all findings with daughter, who states that family would not wish to pursue aggressive / invasive work up, chemo, wants to focus comfort and palliation  -appreciate palliative, heme onc and psych input as well  -trying to set up hospice at [facility] if allowed.      Problem/Plan - 2:  ·  Problem: Failure to thrive in adult.  Plan: 2/2 to malignancy , received IVF in ED and on admission , labs w/ leukocytosis and hypercalcemia  -Corrected Ca 11.3 overall asymptomatic, monitor prn  -resolved with IVF.      Problem/Plan - 3:  ·  Problem: Compression fracture of T11 vertebra, initial encounter.  Plan: Likely pathologic vs 2/2  fall in setting of malignancy, no pain and neuro deficits  - Tylenol prn   - Lidocaine patch.      Problem/Plan - 4:  ·  Problem: Fall, initial encounter.  Plan: Fall - mechanical per history  , CTH w/o bleed , c spine intact   - PT consult pending  - Fall precautions  - given VTE risk in setting of suspect malignancy and afib B>R of continuing a/c in setting of falls per discussion with daughter, r/b/a discussed.      Problem/Plan - 5:  ·  Problem: Afib.  Plan: INR 1/4, c/w home coumadin 2mg with 4mg on M/T  -will given coumadin 3mg tonight  - Monitor INR   - Continue bisoprolol.      Problem/Plan - 6:  Problem: HTN (Hypertension). Plan: - Continue amlodipine.     Problem/Plan - 7:  ·  Problem: Dementia.  Plan: - Continue home medications: Donepezil, Memantine   - Ativan prn for agitation/ anxiety.      Problem/Plan - 8:  ·  Problem: Schizophrenia.  Plan: stable Continue Seroquel  - psych following.      Problem/Plan - 9:  ·  Problem: Medication management.  Plan: DVt ppx: on coumadin  DNR/DNI, pallative following  Dispo: medically clear for discharge to [facility] with home hospice today if setup  spent 45 min on discharge process time.     Attending Attestation:   Plan discussed with NP April and daughter Sania.      Electronic Signatures:  Qi Adams)  (Signed 29-Dec-2020 12:50)  	Authored: Progress Note, Reason for Admission, Subjective and Objective, Assessment and Plan, Attending Attestation      Last Updated: 29-Dec-2020 12:50 by Qi Adams)     84 year old female w/pmh  dementia ( A& O x 1-2), htn, hld, schizophrenia, atrial fibrillation on coumadin ,  p/w fall in setting of ongoing weight loss with numerous lung/liver lesions for malignancy forgoing workup opting for hospice      Problem/Plan - 1:  ·  Problem: Metastatic cancer.  Plan: CT w/ Numerous solid pulmonary nodules and hypoattenuating lesions in the liver, highly suspicious for metastatic disease, primary unknown   -per psych patient does not have capacity to make decisions regarding biopsy or team  -personally discussed all findings with daughter, who states that family would not wish to pursue aggressive / invasive work up, chemo, wants to focus comfort and palliation  -appreciate palliative, heme onc and psych input as well  -Hospice at [facility]      Problem/Plan - 2:  ·  Problem: Failure to thrive in adult.  Plan: 2/2 to malignancy , received IVF in ED and on admission , labs w/ leukocytosis and hypercalcemia  -Corrected Ca 11.3 overall asymptomatic, monitor prn  -resolved with IVF.      Problem/Plan - 3:  ·  Problem: Compression fracture of T11 vertebra, initial encounter.  Plan: Likely pathologic vs 2/2  fall in setting of malignancy, no pain and neuro deficits  - Tylenol prn   - Lidocaine patch.      Problem/Plan - 4:  ·  Problem: Fall, initial encounter.  Plan: Fall - mechanical per history  , CTH w/o bleed , c spine intact   - PT consult pending  - Fall precautions  - given VTE risk in setting of suspect malignancy and afib B>R of continuing a/c in setting of falls per discussion with daughter, r/b/a discussed.      Problem/Plan - 5:  ·  Problem: Afib.  Plan: INR 1/4, c/w home coumadin 2mg with 4mg on M/T  -will given coumadin 3mg tonight  - Monitor INR   - Continue bisoprolol.      Problem/Plan - 6:  Problem: HTN (Hypertension). Plan: - Continue amlodipine.     Problem/Plan - 7:  ·  Problem: Dementia.  Plan: - Continue home medications: Donepezil, Memantine   - Ativan prn for agitation/ anxiety.      Problem/Plan - 8:  ·  Problem: Schizophrenia.  Plan: stable Continue Seroquel  - psych following.      Problem/Plan - 9:  ·  Problem: Medication management.  Plan: DVt ppx: on coumadin  DNR/DNI, pallative following    DCP and medication reconciliation discussed with Dr Adams and in agreement. Patient is medically cleared for discharge to the [facility] with hospice care.         84 year old female w/pmh  dementia ( A& O x 1-2), htn, hld, schizophrenia, atrial fibrillation on coumadin ,  p/w fall in setting of ongoing weight loss with numerous lung/liver lesions for malignancy forgoing workup opting for hospice        Metastatic cancer.  Plan: CT w/ Numerous solid pulmonary nodules and hypoattenuating lesions in the liver, highly suspicious for metastatic disease, primary unknown   -per psych patient does not have capacity to make decisions regarding biopsy or team  -personally discussed all findings with daughter, who states that family would not wish to pursue aggressive / invasive work up, chemo, wants to focus comfort and palliation  -appreciate palliative, heme onc and psych input as well  -Hospice at [facility]       Failure to thrive in adult.  Plan: 2/2 to malignancy , received IVF in ED and on admission , labs w/ leukocytosis and hypercalcemia  -Corrected Ca 11.3 overall asymptomatic.  -resolved with IVF.       Compression fracture of T11 vertebra.   Likely pathologic vs 2/2  fall in setting of malignancy, no pain and neuro deficits  - Tylenol prn   - Lidocaine patch.        Fall - mechanical per history  , CTH w/o bleed , c spine intact   - PT consulted : Home with Home PT  - Fall precautions  - given VTE risk in setting of suspect malignancy and afib B>R of continuing a/c in setting of falls per discussion with daughter, r/b/a discussed.       Afib : INR 1/4, c/w home coumadin 2mg with 4mg on M/T  - Monitor INR   - Continue bisoprolol.      HTN (Hypertension): - Continue amlodipine.     Dementia.: - Continue home medications: Donepezil, Memantine   - Ativan prn for agitation/ anxiety.       Schizophrenia: stable Continue Seroquel        DCP and medication reconciliation discussed with Dr Adams and in agreement. Patient is medically cleared for discharge to the [facility] with hospice care.         84 year old female w/pmh  dementia ( A& O x 1-2), htn, hld, schizophrenia, atrial fibrillation on coumadin ,  p/w fall in setting of ongoing weight loss with numerous lung/liver lesions for malignancy forgoing workup opting for hospice      Problem/Plan - 1:  ·  Problem: Metastatic cancer.  Plan: CT w/ Numerous solid pulmonary nodules and hypoattenuating lesions in the liver, highly suspicious for metastatic disease, primary unknown   -per psych patient does not have capacity to make decisions regarding biopsy   -CA 19-9, CA 15.3, CEA and  all elevated, nonspecific, discussed results with daughter who still wishes to hold off on biopsy for now and focus on comfort  -appreciate heme onc, pallative consults  -hospice at [facility] daughter agreeable to sign consent today.        Problem/Plan - 2:  ·  Problem: Failure to thrive in adult.  Plan: 2/2 to malignancy , received IVF in ED and on admission , labs w/ leukocytosis and hypercalcemia  --leukocytosis likely inflam from malignancy, no s/s of infection, UA panCT negative for infection  -Corrected Ca low 11s overall asymptomatic, monitor prn  -improved with IVF    -outpatient f/u.      Problem/Plan - 3:  ·  Problem: Compression fracture of T11 vertebra, initial encounter.  Plan: Likely pathologic vs 2/2  fall in setting of malignancy, no pain and neuro deficits  - Tylenol prn   - Lidocaine patch.      Problem/Plan - 4:  ·  Problem: Fall, initial encounter.  Plan: Fall - mechanical per history  , CTH w/o bleed , c spine intact   - PT consult pending  - Fall precautions  - given VTE risk in setting of suspect malignancy and afib B>R of continuing a/c in setting of falls per discussion with daughter, r/b/a discussed.      Problem/Plan - 5:  ·  Problem: Afib.  Plan: INR up to 3.2 today, no s/s of bleeding , hgb stable  -hold coumadin tonight,  -check INR at rehab 1-2 days, hold coumadin for now  - Continue bisoprolol.      Problem/Plan - 6:  Problem: HTN (Hypertension). Plan: - Continue amlodipine and biosprolol.     Problem/Plan - 7:  ·  Problem: Dementia.  Plan: - Continue home medications: Donepezil, Memantine   - Ativan prn for agitation/ anxiety.      Problem/Plan - 8:  ·  Problem: Schizophrenia.  Plan: stable Continue Seroquel  - psych recs apprecaited.      Problem/Plan - 9:  ·  Problem: Medication management.  Plan: DVt ppx: on coumadin  DNR/DNI, pallative following  Dispo: medically clear for discharge to [facility] with hospice today, daughter agreeable to sign consents  spent 45 min on discharge process time.

## 2020-12-29 NOTE — PROGRESS NOTE ADULT - SUBJECTIVE AND OBJECTIVE BOX
Patient is a 85y old  Female who presents with a chief complaint of fall in setting of ongoing weight loss (28 Dec 2020 13:49)      SUBJECTIVE / OVERNIGHT EVENTS: No overnight events or compliants        ADDITIONAL REVIEW OF SYSTEMS: Negative except for above    MEDICATIONS  (STANDING):   amLODIPine   Tablet 5 milliGRAM(s) Oral daily  bisoprolol   Tablet 5 milliGRAM(s) Oral daily  cholecalciferol 1000 Unit(s) Oral daily  cyanocobalamin 1000 MICROGram(s) Oral daily  donepezil 10 milliGRAM(s) Oral at bedtime  influenza   Vaccine 0.5 milliLiter(s) IntraMuscular once  memantine 10 milliGRAM(s) Oral two times a day  multivitamin 1 Tablet(s) Oral daily  pantoprazole    Tablet 40 milliGRAM(s) Oral before breakfast  QUEtiapine 50 milliGRAM(s) Oral three times a day  warfarin 3 milliGRAM(s) Oral once    MEDICATIONS  (PRN):  acetaminophen   Tablet .. 650 milliGRAM(s) Oral every 4 hours PRN Mild Pain (1 - 3)  LORazepam     Tablet 0.5 milliGRAM(s) Oral four times a day PRN Anxiety  senna 2 Tablet(s) Oral at bedtime PRN Constipation      CAPILLARY BLOOD GLUCOSE        I&O's Summary    28 Dec 2020 07:01  -  29 Dec 2020 07:00  --------------------------------------------------------  IN: 500 mL / OUT: 0 mL / NET: 500 mL    29 Dec 2020 07:01  -  29 Dec 2020 12:46  --------------------------------------------------------  IN: 300 mL / OUT: 0 mL / NET: 300 mL        PHYSICAL EXAM:  Vital Signs Last 24 Hrs  T(C): 36.6 (29 Dec 2020 12:43), Max: 36.8 (28 Dec 2020 19:28)  T(F): 97.9 (29 Dec 2020 12:43), Max: 98.3 (28 Dec 2020 19:28)  HR: 71 (29 Dec 2020 12:43) (71 - 101)  BP: 109/66 (29 Dec 2020 12:43) (109/66 - 127/70)  BP(mean): --  RR: 18 (29 Dec 2020 12:43) (18 - 18)  SpO2: 96% (29 Dec 2020 12:43) (95% - 97%)    PHYSICAL EXAM:  GENERAL: NAD, elderly  HEAD:  Atraumatic, Normocephalic  NECK: Supple, No JVD  CHEST/LUNG: Clear to auscultation bilaterally; No wheeze  HEART: Regular rate and rhythm;  ABDOMEN: Soft, Nontender, Nondistended; Bowel sounds present  EXTREMITIES:  2+ Peripheral Pulses, No clubbing, cyanosis, or edema  PSYCH: AAOx2  NEUROLOGY: non-focal  SKIN: No rashes or lesions      LABS:                        12.0   13.77 )-----------( 292      ( 29 Dec 2020 06:58 )             37.9     12-29    133<L>  |  99  |  17  ----------------------------<  80  4.3   |  23  |  0.79    Ca    10.9<H>      29 Dec 2020 06:52    TPro  7.0  /  Alb  3.4  /  TBili  0.4  /  DBili  x   /  AST  36  /  ALT  18  /  AlkPhos  408<H>  12-27    PT/INR - ( 29 Dec 2020 09:17 )   PT: 17.3 sec;   INR: 1.49 ratio         PTT - ( 29 Dec 2020 09:17 )  PTT:32.0 sec          Culture - Urine (collected 27 Dec 2020 01:19)  Source: .Urine Clean Catch (Midstream)  Final Report (27 Dec 2020 20:43):    <10,000 CFU/mL Normal Urogenital Manuela        RADIOLOGY & ADDITIONAL TESTS:    Imaging Personally Reviewed:    Electrocardiogram Personally Reviewed:    COORDINATION OF CARE:  Care Discussed with Consultants/Other Providers [Y/N]:  Prior or Outpatient Records Reviewed [Y/N]:

## 2020-12-29 NOTE — PROGRESS NOTE ADULT - PROBLEM SELECTOR PLAN 1
CT w/ Numerous solid pulmonary nodules and hypoattenuating lesions in the liver, highly suspicious for metastatic disease, primary unknown   -per psych patient does not have capacity to make decisions regarding biopsy or team  -personally discussed all findings with daughter, who states that family would not wish to pursue aggressive / invasive work up, chemo, wants to focus comfort and palliation  -appreciate palliative, heme onc and psych input as well  -trying to set up hospice at [facility] if allowed

## 2020-12-29 NOTE — DISCHARGE NOTE PROVIDER - DETAILS OF MALNUTRITION DIAGNOSIS/DIAGNOSES
This patient has been assessed with a concern for Malnutrition and was treated during this hospitalization for the following Nutrition diagnosis/diagnoses:     -  12/29/2020: Severe protein-calorie malnutrition   This patient has been assessed with a concern for Malnutrition and was treated during this hospitalization for the following Nutrition diagnosis/diagnoses:     -  12/29/2020: Severe protein-calorie malnutrition    This patient has been assessed with a concern for Malnutrition and was treated during this hospitalization for the following Nutrition diagnosis/diagnoses:     -  12/29/2020: Severe protein-calorie malnutrition   This patient has been assessed with a concern for Malnutrition and was treated during this hospitalization for the following Nutrition diagnosis/diagnoses:     -  12/29/2020: Severe protein-calorie malnutrition    This patient has been assessed with a concern for Malnutrition and was treated during this hospitalization for the following Nutrition diagnosis/diagnoses:     -  12/29/2020: Severe protein-calorie malnutrition    This patient has been assessed with a concern for Malnutrition and was treated during this hospitalization for the following Nutrition diagnosis/diagnoses:     -  12/29/2020: Severe protein-calorie malnutrition   This patient has been assessed with a concern for Malnutrition and was treated during this hospitalization for the following Nutrition diagnosis/diagnoses:     -  12/29/2020: Severe protein-calorie malnutrition    This patient has been assessed with a concern for Malnutrition and was treated during this hospitalization for the following Nutrition diagnosis/diagnoses:     -  12/29/2020: Severe protein-calorie malnutrition    This patient has been assessed with a concern for Malnutrition and was treated during this hospitalization for the following Nutrition diagnosis/diagnoses:     -  12/29/2020: Severe protein-calorie malnutrition    This patient has been assessed with a concern for Malnutrition and was treated during this hospitalization for the following Nutrition diagnosis/diagnoses:     -  12/29/2020: Severe protein-calorie malnutrition   This patient has been assessed with a concern for Malnutrition and was treated during this hospitalization for the following Nutrition diagnosis/diagnoses:     -  12/29/2020: Severe protein-calorie malnutrition    This patient has been assessed with a concern for Malnutrition and was treated during this hospitalization for the following Nutrition diagnosis/diagnoses:     -  12/29/2020: Severe protein-calorie malnutrition    This patient has been assessed with a concern for Malnutrition and was treated during this hospitalization for the following Nutrition diagnosis/diagnoses:     -  12/29/2020: Severe protein-calorie malnutrition    This patient has been assessed with a concern for Malnutrition and was treated during this hospitalization for the following Nutrition diagnosis/diagnoses:     -  12/29/2020: Severe protein-calorie malnutrition    This patient has been assessed with a concern for Malnutrition and was treated during this hospitalization for the following Nutrition diagnosis/diagnoses:     -  12/29/2020: Severe protein-calorie malnutrition   This patient has been assessed with a concern for Malnutrition and was treated during this hospitalization for the following Nutrition diagnosis/diagnoses:     -  12/29/2020: Severe protein-calorie malnutrition    This patient has been assessed with a concern for Malnutrition and was treated during this hospitalization for the following Nutrition diagnosis/diagnoses:     -  12/29/2020: Severe protein-calorie malnutrition    This patient has been assessed with a concern for Malnutrition and was treated during this hospitalization for the following Nutrition diagnosis/diagnoses:     -  12/29/2020: Severe protein-calorie malnutrition    This patient has been assessed with a concern for Malnutrition and was treated during this hospitalization for the following Nutrition diagnosis/diagnoses:     -  12/29/2020: Severe protein-calorie malnutrition    This patient has been assessed with a concern for Malnutrition and was treated during this hospitalization for the following Nutrition diagnosis/diagnoses:     -  12/29/2020: Severe protein-calorie malnutrition    This patient has been assessed with a concern for Malnutrition and was treated during this hospitalization for the following Nutrition diagnosis/diagnoses:     -  12/29/2020: Severe protein-calorie malnutrition

## 2020-12-29 NOTE — DISCHARGE NOTE PROVIDER - NSDCFUADDAPPT_GEN_ALL_CORE_FT
Daily INR check until therapeutic level is reached, then as per PMD Daily INR check until therapeutic level is reached, then as per PMD  12/31 : Hold coumadin for today and tomorrow and recheck INR in two days, based on the numbers resume coumadin. Daily INR check  12/31: INR 3.2 Hold coumadin for today and tomorrow and recheck INR in 1-2 days, based on the numbers resume coumadin.

## 2020-12-29 NOTE — DISCHARGE NOTE PROVIDER - CARE PROVIDER_API CALL
Marcelino Coulter  [facility]  [address], [address]  [place] [address]  Phone: [phone]  Fax: [phone]  Established Patient  Follow Up Time: 2 weeks

## 2020-12-29 NOTE — DIETITIAN INITIAL EVALUATION ADULT. - PERTINENT MEDS FT
MEDICATIONS  (STANDING):  amLODIPine   Tablet 5 milliGRAM(s) Oral daily  bisoprolol   Tablet 5 milliGRAM(s) Oral daily  cholecalciferol 1000 Unit(s) Oral daily  cyanocobalamin 1000 MICROGram(s) Oral daily  donepezil 10 milliGRAM(s) Oral at bedtime  influenza   Vaccine 0.5 milliLiter(s) IntraMuscular once  memantine 10 milliGRAM(s) Oral two times a day  multivitamin 1 Tablet(s) Oral daily  pantoprazole    Tablet 40 milliGRAM(s) Oral before breakfast  QUEtiapine 50 milliGRAM(s) Oral three times a day  warfarin 3 milliGRAM(s) Oral once    MEDICATIONS  (PRN):  acetaminophen   Tablet .. 650 milliGRAM(s) Oral every 4 hours PRN Mild Pain (1 - 3)  LORazepam     Tablet 0.5 milliGRAM(s) Oral four times a day PRN Anxiety  senna 2 Tablet(s) Oral at bedtime PRN Constipation

## 2020-12-29 NOTE — DIETITIAN INITIAL EVALUATION ADULT. - PROBLEM SELECTOR PLAN 1
CT w/ Numerous solid pulmonary nodules and hypoattenuating lesions in the liver, highly suspicious for metastatic disease, primary unknown ; discussed findings with daughter , who states that family would not wish to pursue aggressive / invasive work up, but is interested in finding source ,  in addition would not elect to undergo chemotherapy , would like to focus on comfort and palliation ,  - oncology consult - to be arranged by day team  - Palliative care consult - to be arranged by day team

## 2020-12-29 NOTE — PROGRESS NOTE ADULT - PROBLEM SELECTOR PLAN 5
INR 1/4, c/w home coumadin 2mg with 4mg on M/T  -will given coumadin 3mg tonight  - Monitor INR   - Continue bisoprolol

## 2020-12-29 NOTE — DIETITIAN INITIAL EVALUATION ADULT. - ADD RECOMMEND
Recommend Ensure Enlive (350 calories, 20g protein/serving) 3x daily to promote adequate energy intake. Encourage PO intake, obtain food preferences, provide feeding assistance as needed. Monitor PO intake, diet tolerance, weight trends, labs, GI function, and skin integrity.

## 2020-12-29 NOTE — DIETITIAN INITIAL EVALUATION ADULT. - PERTINENT LABORATORY DATA
12-29 Na n/a   Glu n/a   K+ n/a   Cr  n/a   BUN n/a   Phos n/a   Alb n/a   PAB n/a   Hgb 12.0 g/dL Hct 37.9 %

## 2020-12-29 NOTE — DIETITIAN INITIAL EVALUATION ADULT. - REASON FOR ADMISSION
84 year old female w/pmh  dementia ( A& O x 1-2), htn, hld, schizophrenia, atrial fibrillation on coumadin ,  p/w fall in setting of ongoing weight loss with numerous lung/liver lesions for malignancy forgoing workup opting for hospice

## 2020-12-29 NOTE — PROGRESS NOTE ADULT - PROBLEM SELECTOR PLAN 3
Likely pathologic vs 2/2  fall in setting of malignancy, no pain and neuro deficits  - Tylenol prn   - Lidocaine patch

## 2020-12-29 NOTE — DISCHARGE NOTE PROVIDER - NSDCCPCAREPLAN_GEN_ALL_CORE_FT
PRINCIPAL DISCHARGE DIAGNOSIS  Diagnosis: Abrasion of scalp, initial encounter  Assessment and Plan of Treatment:       SECONDARY DISCHARGE DIAGNOSES  Diagnosis: Failure to thrive in adult  Assessment and Plan of Treatment:      PRINCIPAL DISCHARGE DIAGNOSIS  Diagnosis: Fall, initial encounter  Assessment and Plan of Treatment: Fall, initial encounter  Prevent falls by make your home safer – To avoid falling at home, get rid of things that might make you trip or slip. This might include furniture, electrical cords, clutter, and loose rugs. Keep your home well lit to avoid falls or accidents. Avoid storing things in high places so you don't have to reach or climb.   Wear sturdy shoes that fit well – Wearing shoes with high heels or slippery soles, or shoes that are too loose, can lead to falls.                                                                                                             Seek immediate help for pain or injury after a fall.        SECONDARY DISCHARGE DIAGNOSES  Diagnosis: HTN (Hypertension)  Assessment and Plan of Treatment: HTN (Hypertension)  Low salt diet  Activity as tolerated.  Take all medication as prescribed.  Follow up with your medical doctor for routine blood pressure monitoring at your next visit.  Notify your doctor if you have any of the following symptoms:   Dizziness, Lightheadedness, Blurry vision, Headache, Chest pain, Shortness of breath'      Diagnosis: Afib  Assessment and Plan of Treatment: Afib  Atrial fibrillation is the most common heart rhythm problem & has the risk of stroke & heart attack  It helps if you control your blood pressure, not drink more than 1-2 alcohol drinks per day, cut down on caffeine, getting treatment for over active thyroid gland, & getting exercise  Call your doctor if you feel your heart racing or beating unusually, chest tightness or pain, lightheaded, faint, shortness of breath especially with exercise  It is important to take your heart medication as prescribed  You may be on anticoagulation which is very important to take as directed - you may need blood work to monitor drug levels  You will need to have your INR checked daily until therapuetic level is reached. Please follow up with your primary care physician after discharge for continued monitoring and management.      Diagnosis: Dementia with behavioral disturbance, unspecified dementia type  Assessment and Plan of Treatment: Dementia with behavioral disturbance, unspecified dementia type  Continue to take your medications as prescribed by your doctor.    Diagnosis: Schizophrenia  Assessment and Plan of Treatment: Schizophrenia  Continue to take your medications as prescribed by your doctor    Diagnosis: Metastatic malignant neoplasm, unspecified site  Assessment and Plan of Treatment: Metastatic malignant neoplasm, unspecified site  Hospice care    Diagnosis: Failure to thrive in adult  Assessment and Plan of Treatment: Continue to dense calorie meals  Please follow up with your primary care physician after discharge for continued monitoring and management.       PRINCIPAL DISCHARGE DIAGNOSIS  Diagnosis: Fall, initial encounter  Assessment and Plan of Treatment: Fall, initial encounter  Prevent falls by make your home safer – To avoid falling at home, get rid of things that might make you trip or slip. This might include furniture, electrical cords, clutter, and loose rugs. Keep your home well lit to avoid falls or accidents. Avoid storing things in high places so you don't have to reach or climb.   Wear sturdy shoes that fit well – Wearing shoes with high heels or slippery soles, or shoes that are too loose, can lead to falls.                                                                                                             Seek immediate help for pain or injury after a fall.        SECONDARY DISCHARGE DIAGNOSES  Diagnosis: HTN (Hypertension)  Assessment and Plan of Treatment: HTN (Hypertension)  Low salt diet  Activity as tolerated.  Take all medication as prescribed.  Follow up with your medical doctor for routine blood pressure monitoring at your next visit.  Notify your doctor if you have any of the following symptoms:   Dizziness, Lightheadedness, Blurry vision, Headache, Chest pain, Shortness of breath'      Diagnosis: Afib  Assessment and Plan of Treatment: Afib  Atrial fibrillation is the most common heart rhythm problem & has the risk of stroke & heart attack  It helps if you control your blood pressure, not drink more than 1-2 alcohol drinks per day, cut down on caffeine, getting treatment for over active thyroid gland, & getting exercise  Call your doctor if you feel your heart racing or beating unusually, chest tightness or pain, lightheaded, faint, shortness of breath especially with exercise  It is important to take your heart medication as prescribed  You may be on anticoagulation which is very important to take as directed - you may need blood work to monitor drug levels  You will need to have your INR checked daily until therapeutic level is reached. Please follow up with your primary care physician after discharge for continued monitoring and management.      Diagnosis: Dementia with behavioral disturbance, unspecified dementia type  Assessment and Plan of Treatment: Dementia with behavioral disturbance, unspecified dementia type  Continue to take your medications as prescribed by your doctor.    Diagnosis: Schizophrenia  Assessment and Plan of Treatment: Schizophrenia  Continue to take your medications as prescribed by your doctor    Diagnosis: Metastatic malignant neoplasm, unspecified site  Assessment and Plan of Treatment: Metastatic malignant neoplasm, unspecified site  Hospice care    Diagnosis: Failure to thrive in adult  Assessment and Plan of Treatment: Continue to dense calorie meals  Please follow up with your primary care physician after discharge for continued monitoring and management.       PRINCIPAL DISCHARGE DIAGNOSIS  Diagnosis: Fall, initial encounter  Assessment and Plan of Treatment: Fall, initial encounter  Prevent falls by make your home safer – To avoid falling at home, get rid of things that might make you trip or slip. This might include furniture, electrical cords, clutter, and loose rugs. Keep your home well lit to avoid falls or accidents. Avoid storing things in high places so you don't have to reach or climb.   Wear sturdy shoes that fit well – Wearing shoes with high heels or slippery soles, or shoes that are too loose, can lead to falls.                                                                                                             Seek immediate help for pain or injury after a fall.        SECONDARY DISCHARGE DIAGNOSES  Diagnosis: Metastatic malignant neoplasm, unspecified site  Assessment and Plan of Treatment: Metastatic malignant neoplasm, unspecified site  Hospice care    Diagnosis: Afib  Assessment and Plan of Treatment: Afib  INR today is 3.2   Hold coumadin for today and tomorrow and recheck INR in two days, based on the numbers resume coumadin.  Call your doctor if you feel your heart racing or beating unusually, chest tightness or pain, lightheaded, faint, shortness of breath especially with exercise  It is important to take your heart medication as prescribed  You may be on anticoagulation which is very important to take as directed - you may need blood work to monitor drug levels  You will need to have your INR checked daily until therapeutic level is reached. Please follow up with your primary care physician after discharge for continued monitoring and management.      Diagnosis: Dementia with behavioral disturbance, unspecified dementia type  Assessment and Plan of Treatment: Dementia with behavioral disturbance, unspecified dementia type  Continue to take your medications as prescribed by your doctor.    Diagnosis: Schizophrenia  Assessment and Plan of Treatment: Schizophrenia  Continue to take your medications as prescribed by your doctor    Diagnosis: HTN (Hypertension)  Assessment and Plan of Treatment: HTN (Hypertension)  Low salt diet  Activity as tolerated.  Take all medication as prescribed.  Follow up with your medical doctor for routine blood pressure monitoring at your next visit.  Notify your doctor if you have any of the following symptoms:   Dizziness, Lightheadedness, Blurry vision, Headache, Chest pain, Shortness of breath'      Diagnosis: Failure to thrive in adult  Assessment and Plan of Treatment: Continue to dense calorie meals  Please follow up with your primary care physician after discharge for continued monitoring and management.       PRINCIPAL DISCHARGE DIAGNOSIS  Diagnosis: Metastatic malignant neoplasm, unspecified site  Assessment and Plan of Treatment: Metastatic malignant neoplasm,  family did not want biopsy  outpatient PCP and /or oncology followup  Hospice care      SECONDARY DISCHARGE DIAGNOSES  Diagnosis: Afib  Assessment and Plan of Treatment: INR today is 3.2   Hold coumadin for today and tomorrow and recheck INR in 1-2 days, based on the numbers resume coumadin.  Call your doctor if you feel your heart racing or beating unusually, chest tightness or pain, lightheaded, faint, shortness of breath especially with exercise  It is important to take your heart medication as prescribed  You may be on anticoagulation which is very important to take as directed - you may need blood work to monitor drug levels  You will need to have your INR checked daily until therapeutic level is reached. Please follow up with your primary care physician after discharge for continued monitoring and management.      Diagnosis: Fall, initial encounter  Assessment and Plan of Treatment: Fall, initial encounter  Prevent falls by make your home safer – To avoid falling at home, get rid of things that might make you trip or slip. This might include furniture, electrical cords, clutter, and loose rugs. Keep your home well lit to avoid falls or accidents. Avoid storing things in high places so you don't have to reach or climb.   Wear sturdy shoes that fit well – Wearing shoes with high heels or slippery soles, or shoes that are too loose, can lead to falls.                                                                                                             Seek immediate help for pain or injury after a fall.      Diagnosis: Dementia with behavioral disturbance, unspecified dementia type  Assessment and Plan of Treatment: Dementia with behavioral disturbance, unspecified dementia type  Continue to take your medications as prescribed by your doctor.    Diagnosis: Schizophrenia  Assessment and Plan of Treatment: Schizophrenia  Continue to take your medications as prescribed by your doctor    Diagnosis: HTN (Hypertension)  Assessment and Plan of Treatment: HTN (Hypertension)  Low salt diet  Activity as tolerated.  Take all medication as prescribed.  Follow up with your medical doctor for routine blood pressure monitoring at your next visit.  Notify your doctor if you have any of the following symptoms:   Dizziness, Lightheadedness, Blurry vision, Headache, Chest pain, Shortness of breath'      Diagnosis: Failure to thrive in adult  Assessment and Plan of Treatment: Continue to dense calorie meals  Please follow up with your primary care physician after discharge for continued monitoring and management.

## 2020-12-29 NOTE — DIETITIAN INITIAL EVALUATION ADULT. - ORAL INTAKE PTA/DIET HISTORY
Interview not appropriate at this time in setting altered mental status. Limited information available PTA.

## 2020-12-29 NOTE — DISCHARGE NOTE PROVIDER - NSDCMRMEDTOKEN_GEN_ALL_CORE_FT
acetaminophen 325 mg oral tablet: 2 tab(s) orally every 8 hours, As Needed  amLODIPine 5 mg oral tablet: 1 tab(s) orally once a day  Ativan 0.5 mg oral tablet: 1 tab(s) orally every 6 hours, As Needed -for agitation MDD:4 tabs   bisoprolol 5 mg oral tablet: 1 tab(s) orally once a day in morning   Colace 100 mg oral capsule: 2 cap(s) orally once a day (at bedtime)  donepezil 10 mg oral tablet: 1 tab(s) orally once a day (at bedtime)  memantine 10 mg oral tablet: 1 tab(s) orally once in morning   omeprazole 40 mg oral delayed release capsule: 1 cap(s) orally once a day  SEROquel 50 mg oral tablet: 1 tablet by mouth twice during day and 1 tablet by mouth at bedtime  Vitamin B12 1000 mcg oral tablet: 1 tab(s) orally once a day  Vitamin D3 1000 intl units (25 mcg) oral tablet: 1 tab(s) orally once a day  warfarin 2 mg oral tablet: 1 tablet once a day on sunday, tuesday, wedneday, thursday, saturday.   warfarin 2 mg oral tablet: 2 tab(s) orally once a day on monday and friday   acetaminophen 325 mg oral tablet: 2 tab(s) orally every 8 hours, As Needed  amLODIPine 5 mg oral tablet: 1 tab(s) orally once a day  Ativan 0.5 mg oral tablet: 1 tab(s) orally every 6 hours, As Needed -for agitation MDD:4 tabs   bisoprolol 5 mg oral tablet: 1 tab(s) orally once a day in morning   Colace 100 mg oral capsule: 2 cap(s) orally once a day (at bedtime)  donepezil 10 mg oral tablet: 1 tab(s) orally once a day (at bedtime)  memantine 10 mg oral tablet: 1 tab(s) orally once in morning   omeprazole 40 mg oral delayed release capsule: 1 cap(s) orally once a day  SEROquel 50 mg oral tablet: 1 tablet by mouth twice during day and 1 tablet by mouth at bedtime  Vitamin B12 1000 mcg oral tablet: 1 tab(s) orally once a day  Vitamin D3 1000 intl units (25 mcg) oral tablet: 1 tab(s) orally once a day  warfarin 2 mg oral tablet: 1 tab(s) orally once a day    INR AT DISCHARGE IS 3.2  HOLD COUMADIN ON 12/31 AND RECHECK INR ON 1/1/2021   acetaminophen 325 mg oral tablet: 2 tab(s) orally every 8 hours, As Needed  amLODIPine 5 mg oral tablet: 1 tab(s) orally once a day  Ativan 0.5 mg oral tablet: 1 tab(s) orally every 6 hours, As Needed -for agitation MDD:4 tabs   bisoprolol 5 mg oral tablet: 1 tab(s) orally once a day in morning   Colace 100 mg oral capsule: 2 cap(s) orally once a day (at bedtime)  donepezil 10 mg oral tablet: 1 tab(s) orally once a day (at bedtime)  omeprazole 40 mg oral delayed release capsule: 1 cap(s) orally once a day  SEROquel 50 mg oral tablet: 1 tablet by mouth twice during day and 1 tablet by mouth at bedtime  Vitamin B12 1000 mcg oral tablet: 1 tab(s) orally once a day  Vitamin D3 1000 intl units (25 mcg) oral tablet: 1 tab(s) orally once a day  warfarin 2 mg oral tablet: 1 tab(s) orally once a day    INR AT DISCHARGE IS 3.2  HOLD COUMADIN ON 12/31 AND RECHECK INR ON 1/1/2021

## 2020-12-29 NOTE — DIETITIAN INITIAL EVALUATION ADULT. - OTHER INFO
Per RN pt with good appetite in-house but does not like hospital food. When pt receives a meal she likes and tolerates, pt is able to consume >75%. Pt normally consuming <75% of meals in setting of preferences. Unable to obtain preferences at this time. RN denies any chewing/swallowing difficulty, self-feeding difficulty, nausea/vomiting, constipation, or diarrhea; last BM 12/29 per RN. Pt noted with loose stool. NKFA noted per chart. Per chart pt noted to take vitamin D3 and B12 at home.     Weight hx per HIE: 110lbs (12/26), 146lbs (9/10), 125lbs (9/1), 121lbs (6/21). 9/10 weight likely an outlier. Pt noted with 15lb (12%) weight loss x3 months (clinically significant).

## 2020-12-29 NOTE — DIETITIAN NUTRITION RISK NOTIFICATION - TREATMENT: THE FOLLOWING DIET HAS BEEN RECOMMENDED
Diet, Regular:   Supplement Feeding Modality:  Oral  Ensure Enlive Cans or Servings Per Day:  3       Frequency:  Daily (12-29-20 @ 17:16) [Pending Verification By Attending]  Diet, Regular (12-27-20 @ 04:44) [Active]

## 2020-12-29 NOTE — PROGRESS NOTE ADULT - PROBLEM SELECTOR PLAN 2
2/2 to malignancy , received IVF in ED and on admission , labs w/ leukocytosis and hypercalcemia  -Corrected Ca 11.3 overall asymptomatic, monitor prn  -resolved with IVF

## 2020-12-30 NOTE — PROGRESS NOTE ADULT - PROBLEM SELECTOR PLAN 5
INR up to 2.3,  home coumadin 2mg with 4mg on M/T  -will given coumadin 1mg tonight  - Monitor INR   - Continue bisoprolol

## 2020-12-30 NOTE — HOSPICE CARE NOTE - CONVESATION DETAILS
Awaiting consents from daughter. Daughter Sania state that she is awaiting results from blood work. State that a doctor from the hospital called her and informed   her that the blood work that the Oncologist told her would have been done was not done and she wants to know why and also she is very interested in the results from said blood work.  Discussed DC with the staff at the [facility] re visiting. This information was reported back to Sania who verbalized good understanding that the facility is on a lockdown and will make appropriate changes for patients and their family  if patient is imminently dying.   TC with Lili esteban same. 
TC to daughter Sania. Discussed hospice services. Consents emailed to her. Sania will print consents and state that she will review and be prepared to sign on 12/30/2020.  Plan is for DC back to the [facility] with hospice.   TC to the [facility] to discuss DC . Message left

## 2020-12-30 NOTE — PROGRESS NOTE ADULT - PROBLEM SELECTOR PLAN 2
2/2 to malignancy , received IVF in ED and on admission , labs w/ leukocytosis and hypercalcemia  -Corrected Ca 11.3 overall asymptomatic, monitor prn  -improved with IVF

## 2020-12-30 NOTE — PROGRESS NOTE ADULT - PROBLEM SELECTOR PLAN 1
CT w/ Numerous solid pulmonary nodules and hypoattenuating lesions in the liver, highly suspicious for metastatic disease, primary unknown   -per psych patient does not have capacity to make decisions regarding biopsy   -rediscussed with daughter again today, she does not want to pursue biopsy, invasive work up, chemo, wants to focus comfort and palliation with hospice but refusing to sign hospice paperwork until tumor markers are sent. I explained to daughter Sania that tumor markers will unlikely  as we need tissue diagnosis with biopsy however she became verbally abusive using profanity and ask to speak to oncology team who will reach out to her shortly   -discussed with oncology team, will send out CEA, CA 19-9, , Ca 15.3 now  -appreciate palliative, heme onc input  -hospice at [facility] once daughter signs consents

## 2020-12-30 NOTE — PROGRESS NOTE ADULT - SUBJECTIVE AND OBJECTIVE BOX
Patient is a 85y old  Female who presents with a chief complaint of 84 year old female w/pmh  dementia ( A& O x 1-2), htn, hld, schizophrenia, atrial fibrillation on coumadin ,  p/w fall in setting of ongoing weight loss with numerous lung/liver lesions for malignancy forgoing workup opting for hospice (29 Dec 2020 16:44)      SUBJECTIVE / OVERNIGHT EVENTS: No overnight events. no complaints by patient. Although daughter refusing biopsy and wants to focus on comfort, she is refusing to sign hospice paperwork until tumor markers are sent which will unlikely .      ADDITIONAL REVIEW OF SYSTEMS: Negative except for above    MEDICATIONS  (STANDING):  amLODIPine   Tablet 5 milliGRAM(s) Oral daily  bisoprolol   Tablet 5 milliGRAM(s) Oral daily  cholecalciferol 1000 Unit(s) Oral daily  cyanocobalamin 1000 MICROGram(s) Oral daily  donepezil 10 milliGRAM(s) Oral at bedtime  influenza   Vaccine 0.5 milliLiter(s) IntraMuscular once  memantine 10 milliGRAM(s) Oral two times a day  multivitamin 1 Tablet(s) Oral daily  pantoprazole    Tablet 40 milliGRAM(s) Oral before breakfast  QUEtiapine 50 milliGRAM(s) Oral three times a day    MEDICATIONS  (PRN):  acetaminophen   Tablet .. 650 milliGRAM(s) Oral every 4 hours PRN Mild Pain (1 - 3)  LORazepam     Tablet 0.5 milliGRAM(s) Oral four times a day PRN Anxiety  senna 2 Tablet(s) Oral at bedtime PRN Constipation      CAPILLARY BLOOD GLUCOSE        I&O's Summary    29 Dec 2020 07:01  -  30 Dec 2020 07:00  --------------------------------------------------------  IN: 300 mL / OUT: 0 mL / NET: 300 mL        PHYSICAL EXAM:  Vital Signs Last 24 Hrs  T(C): 36.4 (30 Dec 2020 12:08), Max: 37.1 (29 Dec 2020 19:33)  T(F): 97.5 (30 Dec 2020 12:08), Max: 98.7 (29 Dec 2020 19:33)  HR: 97 (30 Dec 2020 12:08) (71 - 103)  BP: 113/72 (30 Dec 2020 12:08) (100/62 - 117/82)  BP(mean): --  RR: 17 (30 Dec 2020 12:08) (17 - 18)  SpO2: 96% (30 Dec 2020 12:08) (96% - 98%)    PHYSICAL EXAM:  GENERAL: NAD, elderly  HEAD:  Atraumatic, Normocephalic  CHEST/LUNG: Clear to auscultation bilaterally; No wheeze  HEART: Regular rate and rhythm;   ABDOMEN: Soft, Nontender, Nondistended; Bowel sounds present  EXTREMITIES:  2+ Peripheral Pulses, No clubbing, cyanosis, or edema  PSYCH: AAOx2  NEUROLOGY: non-focal  SKIN: No rashes or lesions      LABS:                        11.5   14.20 )-----------( 297      ( 30 Dec 2020 06:17 )             36.8     12-30    135  |  99  |  14  ----------------------------<  104<H>  4.3   |  24  |  0.81    Ca    10.8<H>      30 Dec 2020 06:17      PT/INR - ( 30 Dec 2020 06:17 )   PT: 27.1 sec;   INR: 2.35 ratio         PTT - ( 30 Dec 2020 08:18 )  PTT:36.9 sec            RADIOLOGY & ADDITIONAL TESTS:    Imaging Personally Reviewed:    Electrocardiogram Personally Reviewed:    COORDINATION OF CARE:  Care Discussed with Consultants/Other Providers [Y/N]:  Prior or Outpatient Records Reviewed [Y/N]:

## 2020-12-31 NOTE — PROGRESS NOTE ADULT - PROBLEM SELECTOR PROBLEM 3
Compression fracture of T11 vertebra, initial encounter

## 2020-12-31 NOTE — PROGRESS NOTE ADULT - PROBLEM SELECTOR PLAN 9
DVt ppx: on coumadin  DNR/DNI, pallative following  Dispo: medically clear for discharge to [facility] with hospice once daughter signs consents after tumor markers are back  spent 45 min on discharge process time
DVt ppx: on coumadin  DNR/DNI, pallative following  Dispo: medically clear for discharge to [facility] with home hospice today if setup  spent 45 min on discharge process time
DVt ppx: on coumadin  DNR/DNI, pallative following  Dispo: medically clear for discharge to [facility] with hospice today, daughter agreeable to sign consents  spent 45 min on discharge process time
Family requesting to limit medications , especially meds that may be contributing to drowsiness
Family requesting to limit medications , especially meds that may be contributing to drowsiness, will attempt to minimize meds  DVt ppx: on coumdin  [facility], palliative following, DNR/DNI

## 2020-12-31 NOTE — DISCHARGE NOTE NURSING/CASE MANAGEMENT/SOCIAL WORK - PATIENT PORTAL LINK FT
You can access the FollowMyHealth Patient Portal offered by [facility] by registering at the following website: [email or web address]. By joining [facility]’s FollowMyHealth portal, you will also be able to view your health information using other applications (apps) compatible with our system.

## 2020-12-31 NOTE — PROGRESS NOTE ADULT - PROBLEM SELECTOR PLAN 8
stable Continue Seroquel  - psych recs apprecaited stable Continue Seroquel  - psych recs appreciated

## 2020-12-31 NOTE — PROGRESS NOTE ADULT - SUBJECTIVE AND OBJECTIVE BOX
Patient is a 85y old  Female who presents with a chief complaint of fall in setting of ongoing weight loss (30 Dec 2020 12:24)      SUBJECTIVE / OVERNIGHT EVENTS: No overnight events. Denies any bleeding, melena, hematochezia. Feels ok no compliants          ADDITIONAL REVIEW OF SYSTEMS: Negative except for above    MEDICATIONS  (STANDING):  amLODIPine   Tablet 5 milliGRAM(s) Oral daily  bisoprolol   Tablet 5 milliGRAM(s) Oral daily  cholecalciferol 1000 Unit(s) Oral daily  cyanocobalamin 1000 MICROGram(s) Oral daily  donepezil 10 milliGRAM(s) Oral at bedtime  influenza   Vaccine 0.5 milliLiter(s) IntraMuscular once  memantine 10 milliGRAM(s) Oral two times a day  multivitamin 1 Tablet(s) Oral daily  pantoprazole    Tablet 40 milliGRAM(s) Oral before breakfast  QUEtiapine 50 milliGRAM(s) Oral three times a day    MEDICATIONS  (PRN):  acetaminophen   Tablet .. 650 milliGRAM(s) Oral every 4 hours PRN Mild Pain (1 - 3)  LORazepam     Tablet 0.5 milliGRAM(s) Oral four times a day PRN Anxiety  senna 2 Tablet(s) Oral at bedtime PRN Constipation      CAPILLARY BLOOD GLUCOSE        I&O's Summary    31 Dec 2020 07:01  -  31 Dec 2020 11:42  --------------------------------------------------------  IN: 600 mL / OUT: 0 mL / NET: 600 mL        PHYSICAL EXAM:  Vital Signs Last 24 Hrs  T(C): 37.1 (31 Dec 2020 07:58), Max: 37.1 (31 Dec 2020 07:58)  T(F): 98.7 (31 Dec 2020 07:58), Max: 98.7 (31 Dec 2020 07:58)  HR: 94 (31 Dec 2020 07:58) (89 - 97)  BP: 132/87 (31 Dec 2020 07:58) (98/54 - 132/87)  BP(mean): --  RR: 18 (31 Dec 2020 07:58) (16 - 18)  SpO2: 96% (31 Dec 2020 07:58) (96% - 98%)    PHYSICAL EXAM:  GENERAL: NAD, elderly  HEAD:  Atraumatic, Normocephalic  NECK: Supple, No JVD  CHEST/LUNG: Clear to auscultation bilaterally; No wheeze  HEART: Regular rate and rhythm; No murmurs, rubs, or gallops  ABDOMEN: Soft, Nontender, Nondistended; Bowel sounds present  EXTREMITIES:  2+ Peripheral Pulses, No clubbing, cyanosis, or edema  PSYCH: AAOx2  NEUROLOGY: non-focal  SKIN: No rashes or lesions      LABS:                        12.2   14.38 )-----------( 322      ( 31 Dec 2020 10:41 )             38.2     12-30    135  |  99  |  14  ----------------------------<  104<H>  4.3   |  24  |  0.81    Ca    10.8<H>      30 Dec 2020 06:17      PT/INR - ( 31 Dec 2020 10:41 )   PT: 36.3 sec;   INR: 3.20 ratio         PTT - ( 30 Dec 2020 08:18 )  PTT:36.9 sec            RADIOLOGY & ADDITIONAL TESTS:    Imaging Personally Reviewed:    Electrocardiogram Personally Reviewed:    COORDINATION OF CARE:  Care Discussed with Consultants/Other Providers [Y/N]:  Prior or Outpatient Records Reviewed [Y/N]:

## 2020-12-31 NOTE — CHART NOTE - NSCHARTNOTEFT_GEN_A_CORE
Nutrition Follow Up Note  Patient seen for: malnutrition follow up     Source: Comprehensive chart review and medical team.     Chart reviewed, events noted. Pertinent chart information: Pending daughter's signature for home hospice.     Diet : Diet, Regular:   Supplement Feeding Modality:  Oral  Ensure Enlive Cans or Servings Per Day:  3       Frequency:  Daily (12-29-20 @ 17:16)      Subjective: Pt with continued fair appetite depending on what is being served. Pt drinking and tolerating supplements. Pt with continued fecal incontinence; last BM 12/29 per flowsheet.      PO intake : <75%     Source for PO intake: RN     Enteral /Parenteral Nutrition: n/a      Daily   % Weight Change  no  new weights at this time    Pertinent Medications: MEDICATIONS  (STANDING):  amLODIPine   Tablet 5 milliGRAM(s) Oral daily  bisoprolol   Tablet 5 milliGRAM(s) Oral daily  cholecalciferol 1000 Unit(s) Oral daily  cyanocobalamin 1000 MICROGram(s) Oral daily  donepezil 10 milliGRAM(s) Oral at bedtime  influenza   Vaccine 0.5 milliLiter(s) IntraMuscular once  memantine 10 milliGRAM(s) Oral two times a day  multivitamin 1 Tablet(s) Oral daily  pantoprazole    Tablet 40 milliGRAM(s) Oral before breakfast  QUEtiapine 50 milliGRAM(s) Oral three times a day    MEDICATIONS  (PRN):  acetaminophen   Tablet .. 650 milliGRAM(s) Oral every 4 hours PRN Mild Pain (1 - 3)  LORazepam     Tablet 0.5 milliGRAM(s) Oral four times a day PRN Anxiety  senna 2 Tablet(s) Oral at bedtime PRN Constipation    Pertinent Labs:   Finger Sticks:      Skin per nursing documentation: no pressure injury documented   Edema: none noted    Estimated Needs:   [X] no change since previous assessment  [ ] recalculated:     Previous Nutrition Diagnosis: severe malnutrition  Nutrition Diagnosis is: ongoing; addressed with nutritional supplments.      Interventions:     Recommend  1) Continue current diet and supplement order as tolerated.   2) Encourage PO intake, obtain food preferences, provide feeding assistance as needed.   3) Monitor for GOC.     Monitoring and Evaluation:     Continue to monitor Nutritional intake, Tolerance to diet prescription, weights, labs, skin integrity    RD remains available upon request and will follow up per protocol    Serafin Bhakta RD pager # [phone]

## 2020-12-31 NOTE — PROGRESS NOTE ADULT - PROBLEM SELECTOR PLAN 5
INR up to 3.2 today, no s/s of bleeding    -hold coumadin tonight  -check INR at rehab 1-2 days, hold coumadin for now  - Continue bisoprolol INR up to 3.2 today, no s/s of bleeding, hgb stable  -hold coumadin tonight  -check INR at rehab 1-2 days, hold coumadin for now  - Continue bisoprolol

## 2020-12-31 NOTE — PROGRESS NOTE ADULT - PROBLEM SELECTOR PLAN 1
CT w/ Numerous solid pulmonary nodules and hypoattenuating lesions in the liver, highly suspicious for metastatic disease, primary unknown   -per psych patient does not have capacity to make decisions regarding biopsy   -CA 19-9, CA 15.3, CEA and  all elevated, nonspecific, discussed results with daughter who still wishes to hold off on biopsy for now and focus on comfort  -appreciate heme onc, pallative consults  -hospice at [facility] daughter agreeable to sign consent today

## 2020-12-31 NOTE — PROGRESS NOTE ADULT - PROBLEM SELECTOR PLAN 2
2/2 to malignancy , received IVF in ED and on admission , labs w/ leukocytosis and hypercalcemia  -Corrected Ca low 11s overall asymptomatic, monitor prn  -improved with IVF  -outpatient f/u 2/2 to malignancy , received IVF in ED and on admission , labs w/ leukocytosis and hypercalcemia  ---leukocytosis likely inflam from malignancy, no s/s of infection, UA panCT negative for infection  -Corrected Ca low 11s overall asymptomatic, monitor prn  -improved with IVF  -outpatient f/u

## 2020-12-31 NOTE — PROGRESS NOTE ADULT - REASON FOR ADMISSION
fall in setting of ongoing weight loss

## 2020-12-31 NOTE — DISCHARGE NOTE NURSING/CASE MANAGEMENT/SOCIAL WORK - NSDCFUADDAPPT_GEN_ALL_CORE_FT
Daily INR check  12/31: INR 3.2 Hold coumadin for today and tomorrow and recheck INR in 1-2 days, based on the numbers resume coumadin.

## 2020-12-31 NOTE — PROGRESS NOTE ADULT - NSHPATTENDINGPLANDISCUSS_GEN_ALL_CORE
Nancy Ford and Daughter Sania
PREETI April and daughter Sania
PREETI Friedman, daughter Sania, oncology team
daughter PREETI Lui

## 2020-12-31 NOTE — PROGRESS NOTE ADULT - ASSESSMENT
84 year old female w/pmh  dementia ( A& O x 1-2), htn, hld, schizophrenia, atrial fibrillation on coumadin ,  p/w fall in setting of ongoing weight loss with numerous lung/liver lesions for malignancy forgoing workup opting for hospice 
84 year old female w/pmh  dementia ( A& O x 1-2), htn, hld, schizophrenia, atrial fibrillation on coumadin ,  p/w fall in setting of ongoing weight loss with numerous lung/liver lesions for malignancy forgoing workup opting for hospice 
84 year old female w/pmh  dementia ( A& O x 1-2), htn, hld, schizophrenia, atrial fibrillation on coumadin ,  p/w fall in setting of ongoing weight loss 
84 year old female w/pmh  dementia ( A& O x 1-2), htn, hld, schizophrenia, atrial fibrillation on coumadin ,  p/w fall in setting of ongoing weight loss 
84 year old female w/pmh  dementia ( A& O x 1-2), htn, hld, schizophrenia, atrial fibrillation on coumadin ,  p/w fall in setting of ongoing weight loss with numerous lung/liver lesions for malignancy forgoing workup opting for hospice

## 2021-01-01 PROCEDURE — 80053 COMPREHEN METABOLIC PANEL: CPT

## 2021-01-01 PROCEDURE — 87635 SARS-COV-2 COVID-19 AMP PRB: CPT

## 2021-01-01 PROCEDURE — 83735 ASSAY OF MAGNESIUM: CPT

## 2021-01-01 PROCEDURE — 97116 GAIT TRAINING THERAPY: CPT

## 2021-01-01 PROCEDURE — 86304 IMMUNOASSAY TUMOR CA 125: CPT

## 2021-01-01 PROCEDURE — 86300 IMMUNOASSAY TUMOR CA 15-3: CPT

## 2021-01-01 PROCEDURE — 99285 EMERGENCY DEPT VISIT HI MDM: CPT | Mod: 25

## 2021-01-01 PROCEDURE — 90715 TDAP VACCINE 7 YRS/> IM: CPT

## 2021-01-01 PROCEDURE — 72125 CT NECK SPINE W/O DYE: CPT

## 2021-01-01 PROCEDURE — 71045 X-RAY EXAM CHEST 1 VIEW: CPT

## 2021-01-01 PROCEDURE — 82378 CARCINOEMBRYONIC ANTIGEN: CPT

## 2021-01-01 PROCEDURE — 71260 CT THORAX DX C+: CPT

## 2021-01-01 PROCEDURE — 85610 PROTHROMBIN TIME: CPT

## 2021-01-01 PROCEDURE — 86301 IMMUNOASSAY TUMOR CA 19-9: CPT

## 2021-01-01 PROCEDURE — 86769 SARS-COV-2 COVID-19 ANTIBODY: CPT

## 2021-01-01 PROCEDURE — 90471 IMMUNIZATION ADMIN: CPT

## 2021-01-01 PROCEDURE — 80048 BASIC METABOLIC PNL TOTAL CA: CPT

## 2021-01-01 PROCEDURE — 87086 URINE CULTURE/COLONY COUNT: CPT

## 2021-01-01 PROCEDURE — 72170 X-RAY EXAM OF PELVIS: CPT

## 2021-01-01 PROCEDURE — 70450 CT HEAD/BRAIN W/O DYE: CPT

## 2021-01-01 PROCEDURE — 74177 CT ABD & PELVIS W/CONTRAST: CPT

## 2021-01-01 PROCEDURE — 85025 COMPLETE CBC W/AUTO DIFF WBC: CPT

## 2021-01-01 PROCEDURE — 51701 INSERT BLADDER CATHETER: CPT

## 2021-01-01 PROCEDURE — 81001 URINALYSIS AUTO W/SCOPE: CPT

## 2021-01-01 PROCEDURE — 85730 THROMBOPLASTIN TIME PARTIAL: CPT

## 2021-01-01 PROCEDURE — 84100 ASSAY OF PHOSPHORUS: CPT

## 2021-01-01 PROCEDURE — 85027 COMPLETE CBC AUTOMATED: CPT

## 2021-01-01 PROCEDURE — 97162 PT EVAL MOD COMPLEX 30 MIN: CPT

## 2021-03-02 NOTE — PROGRESS NOTE BEHAVIORAL HEALTH - NS ED [facility] MED ROS CARDIOVASCULAR
----- Message from Porsche Borden MD sent at 3/2/2021  3:18 PM EST -----    ----- Message -----  From: Interface, Rad Results [place] In  Sent: 3/2/2021   3:10 PM EST  To: Zuly Evans MD      
Unable to assess

## 2021-08-21 NOTE — OCCUPATIONAL THERAPY INITIAL EVALUATION ADULT - DIAGNOSIS, OT EVAL
Patient presents with decreased balance, strength, endurance impacting ability to perform ADLs and functional mobility
85621 Exp Problem Focused - Mod. Complex

## 2021-09-30 NOTE — ED PROVIDER NOTE - MDM PATIENT STATEMENT FOR ADDL TREATMENT
R LE/weight-bearing as tolerated Patient with one or more new problems requiring additional work-up/treatment.

## 2021-10-03 NOTE — DISCHARGE NOTE NURSING/CASE MANAGEMENT/SOCIAL WORK - NSTRANSFERBELONGINGSRESP_GEN_A_NUR
Implemented All Universal Safety Interventions:  [place] to call system. Call bell, personal items and telephone within reach. Instruct patient to call for assistance. Room bathroom lighting operational. Non-slip footwear when patient is off stretcher. Physically safe environment: no spills, clutter or unnecessary equipment. Stretcher in lowest position, wheels locked, appropriate side rails in place.
yes

## 2021-11-10 NOTE — BEHAVIORAL HEALTH ASSESSMENT NOTE - NSBHCHARTREVIEWIMAGING_PSY_A_CORE FT
Recommended observation. < from: CT Head No Cont (09.02.20 @ 00:12) >      Beam hardening artifact slightly obscures evaluation of the posterior fossa and brainstem.    There is no acute intra-axial or extra-axial hemorrhage. No mass effect or shift ofthe midline. The basal cisterns are not effaced. There is mild cerebral volume loss with proportional prominence of the ventricles and sulci. There are mild patchy areas of low attenuation within the periventricular and subcortical white matter whichare nonspecific but likely the sequela of chronic microvascular change. There is no CT evidence of a large vascular territory infarct. Intracranial vascular calcifications identified.    The visualized paranasal sinuses and mastoid air cells are wellaerated.    No acute displaced calvarium fracture. No significant scalp soft tissue swelling.    IMPRESSION:    No acute intracranial hemorrhage, mass effect, or acute displaced calvarium fracture. Mild involutional and chronic microvascular ischemicgliotic changes. If there are new or persistent symptoms, consider further evaluation with MRI provided no contraindications.    < end of copied text >

## 2021-12-14 NOTE — ED PROVIDER NOTE - NS ED ATTENDING STATEMENT MOD
I have personally seen and examined this patient.  I have fully participated in the care of this patient. I have reviewed all pertinent clinical information, including history, physical exam, plan and the Resident’s note and agree except as noted. "YOU'RE SO HANDSOME"

## 2022-01-10 NOTE — ED ADULT NURSE NOTE - NSHISCREENINGQ1_ED_A_ED
There are no preventive care reminders to display for this patient.    Patient is up to date, no discussion needed.     No

## 2022-05-12 NOTE — H&P ADULT - NEGATIVE NEUROLOGICAL SYMPTOMS
[FreeTextEntry1] : Patient with continued fissure symptoms.  She responded to topical diltiazem in the past.  She will resume that treatment.  If it is unsuccessful she will contact my office to arrange Botox injections.
no loss of consciousness/no syncope/no weakness

## 2022-08-30 NOTE — CONSULT NOTE ADULT - TREATMENT GUIDELINES
DNI/DNR Order/No artificial nutrition Minocycline Counseling: Patient advised regarding possible photosensitivity and discoloration of the teeth, skin, lips, tongue and gums.  Patient instructed to avoid sunlight, if possible.  When exposed to sunlight, patients should wear protective clothing, sunglasses, and sunscreen.  The patient was instructed to call the office immediately if the following severe adverse effects occur:  hearing changes, easy bruising/bleeding, severe headache, or vision changes.  The patient verbalized understanding of the proper use and possible adverse effects of minocycline.  All of the patient's questions and concerns were addressed.

## 2022-09-02 NOTE — PHYSICAL THERAPY INITIAL EVALUATION ADULT - THERAPY FREQUENCY, PT EVAL
Patient called regarding medication refill for Lexapro. Advised him the provider told him he would need to schedule an appointment with Behavioral health, or here per provider's notes, and that a refill for 1 month was sent in. He said he would schedule with behavioral health.   2-3x/week

## 2022-09-14 NOTE — H&P ADULT - NEGATIVE ENMT SYMPTOMS
"  Physical Therapy Daily Treatment Note     Name: Jess Denney Shawnee  Clinic Number: [id number]  Therapy Diagnosis:        Encounter Diagnoses   Name Primary?    Acute medial meniscal tear, left, sequela      Acute pain of left knee        Physician: Janette Odonnell MD     Physician Orders: PT Eval and Treat   Medical Diagnosis from Referral: S83.242S (ICD-10-CM) - Acute medial meniscal tear, left, sequela  Evaluation Date: 5/2/2022  Authorization Period Expiration: 12/31/2022  Plan of Care Expiration: 8/2/2022  Visit # / Visits authorized: 4/20     Time In: 0704  Time Out: 0757  Total Appointment Time (timed & untimed codes): 53 minutes     Precautions: Fall and Weightbearing     Post-Op Plan:  Standard knee arthroscopy protocol  Subjective     Pt reports: progress with current treatment. Still has medial knee pain but it I improving.   She was NOT compliant with home exercise program yesterday due to an allergic reaction.   Response to previous treatment: no issues   Functional change: min antalgic gait pattern    Pain: 1-2/10  Location: left knee      Objective     Jess received therapeutic exercises to develop strength, endurance, ROM, flexibility, and posture for 43 minutes including:    Stationary bike for 10' for increased ROM, circulation, mm strength/endurance  Shuttle press 50# 3x10  Shuttle B SL press 25# 3x10 ea  B YTB SL Clamshell 10 x 10"  YTB hip abd bridges 10x/3"  SAQ 5 lbs, 3 x 10 to 15  SLR 3 x 12      Education on HEP    Jess received the following manual therapy techniques: Joint mobilizations, Manual traction, and Soft tissue Mobilization were applied to the:  for 10 minutes, including: patellar mobs, joint capsular distraction, joint hinge ext, STM, heel cord, quad and HS stretch.      Jess participated in neuromuscular re-education activities to improve: Balance, Coordination, Sense, and Proprioception for  minutes. The following activities were included:      Jess participated in gait " training to improve functional mobility and safety for   minutes, including:      Jess received cold pack for 10 minutes to to decrease circulation, pain, and swelling.      Home Exercises Provided and Patient Education Provided     Education provided:   Compliance with HEP     Written Home Exercises Provided: yes.  Exercises were reviewed and Jess was able to demonstrate them prior to the end of the session.  Jess demonstrated good  understanding of the education provided.     Assessment   Is progressing with strengthening. Requires break time in between exercises and sets due to respiratory issues. Continue to progress with quad and posterior chain strengthening.     Jess Is progressing well towards her goals.   Pt prognosis is Good.     Pt will continue to benefit from skilled outpatient physical therapy to address the deficits listed in the problem list box on initial evaluation, provide pt/family education and to maximize pt's level of independence in the home and community environment.     Pt's spiritual, cultural and educational needs considered and pt agreeable to plan of care and goals.    Anticipated barriers to physical therapy: none     Goals: GOALS: Short Term Goals:  4 weeks  1.Report decreased knee pain  < / =  2/10  to increase tolerance for return to work pain free   2. Increase strength by 1/3 MMT grade in hip ABD to increase tolerance for work  3. Increase strength by 1/3 MMT grade in quad  to increase tolerance for ADL and work activities.  4. Pt to tolerate HEP to improve ROM and independence with ADL's     Long Term Goals: 8 weeks  1.Report decreased knee pain < / = 0/10  to increase tolerance for return to exercise   2.Patient goal: return to work and ADL pain free  3.Increase strength to >/= 4+/5 in quad and glut  to increase tolerance for ADL and work activities.  4. Pt will report at CJ level (20-40% impaired) on FOTO knee to demonstrate increase in LE function with every day  tasks.    Plan     Continue with POC     Shay Garcia, PT, DPT           no sinus symptoms/no nasal obstruction/no nasal congestion/no nasal discharge

## 2022-10-18 NOTE — PROGRESS NOTE ADULT - SUBJECTIVE AND OBJECTIVE BOX
Pt seen/examined. Doing well. Pain controlled. No acute overnight complaints or events.    T(C): 37.2 (01-28-20 @ 05:32), Max: 37.3 (01-27-20 @ 17:28)  HR: 99 (01-28-20 @ 05:32) (82 - 99)  BP: 129/80 (01-28-20 @ 05:32) (114/71 - 142/72)  RR: 18 (01-28-20 @ 05:32) (18 - 18)  SpO2: 97% (01-28-20 @ 05:32) (95% - 98%)  Wt(kg): --    Gen: awake, alert, NAD  Resp: no increased work of breathing  LLE:  +EHL/FHL/TA/GS  SILT L3-S1  +DP/PT Pulses  Compartments soft  No calf TTP  dressing clean, dry, intact Yes

## 2022-10-27 NOTE — BEHAVIORAL HEALTH ASSESSMENT NOTE - NS ED BHA ALCOHOL
Impression: Age-related nuclear cataract, right eye: H25.11. Condition: established, worsening. Symptoms: could improve with surgery. Plan: Cataract accounts for patient's complaints. Reviewed risks, benefits, and procedure. Patient desires surgery, schedule ce/iol OD, RL2, standard lens, distance refractive target, patient is clear for surgery in [facility] 27.  Dextenza recommended Yes

## 2023-02-07 NOTE — DISCHARGE NOTE ADULT - MEDICATION SUMMARY - MEDICATIONS TO CHANGE
No
I will SWITCH the dose or number of times a day I take the medications listed below when I get home from the hospital:    Coumadin 2.5 mg oral tablet  -- 1 tab(s) by mouth every other day    Coumadin 2.5 mg oral tablet  -- orally every other day    warfarin 5 mg oral tablet  -- 1 tab(s) by mouth every other day; 0.5 tab by mouth every other day (5mg and 2.5mg)

## 2023-08-30 NOTE — DIETITIAN NUTRITION RISK NOTIFICATION - PROVIDER ATTESTATION
[facility] INTERNAL MEDICINE  [address] [facility] [address]  [place] [address]  Dept: [phone]  Dept Fax: [phone]  Loc: [phone]    Edgardo Andre is a 56 y.o. female who presents today for her medical conditions/complaintsas noted below. Edgardo Andre is c/o of Nasal Congestion (Went to  and now sinus's are worse)        HPI:     HPI  Presents today for nasal congestion and sinus pressure. This has been ongoing for several days now. She has had strep exposure. She was seen at urgent care yesterday and has had a negative strep and COVID test.  She reports today her symptoms are worse. She has tried nasal spray and it has has helped some. She may have had some fever on Monday. She has had cold chills. Past Medical History:   Diagnosis Date    Acquired deviated nasal septum     Anal bleeding 10/31/2016    Ankle injury     Asthma     Benign paroxysmal positional vertigo     Derangement of medial meniscus     L knee mild, clinical diagnosis 9/19/12 after turning foot and popping sound 9/16/12 while walking and turn.     Eustachian tube dysfunction     Excessive sleepiness     During the day    Generalized anxiety disorder     H/O varicose veins     History of sprain of foot     Osteoarthritis     Pain in joint of left knee     Palpitations     Psoriasis      Past Surgical History:   Procedure Laterality Date    APPENDECTOMY      BREAST SURGERY      lift and a left implant    Abel Oliver    COLONOSCOPY N/A 12/12/2022    Dr Mariajose Sethi, (-)Micro Colitis, sm patch of erythema in distal rectum, Mod diverticulosis left colon, int hem Gr 1, Sub prep no adequate Repeat 12-13-22    COLONOSCOPY N/A 12/13/2022    Dr Mariajose Sethi, sm erosions in areas of biopsies done from day previous, sm ulcerations wo bleeding at site of polypectomy, tortuous redundant colon, int hem Gr 1 likely recent blood from rectum By signing this assessment you are acknowledging and agree with the diagnosis/diagnoses assigned by the Registered Dietitian

## 2023-11-23 NOTE — PHYSICAL THERAPY INITIAL EVALUATION ADULT - IMPAIRMENTS CONTRIBUTING TO GAIT DEVIATIONS, PT EVAL
occasionally words are mis-transcribed.)    Jaspal Alarcon MD (electronically signed)  Emergency Attending Physician / Physician Assistant / Nurse Practitioner        Matt Lucia MD  11/22/23 [id number]
impaired balance/cognition/pain/decreased strength/fear of falling

## 2024-05-02 NOTE — PHYSICAL THERAPY INITIAL EVALUATION ADULT - ASR WT BEARING STATUS EVAL
no weight-bearing restrictions
   PHOS 4.1 05/17/2014 07:27 AM    PHOS 3.6 05/16/2014 07:10 AM    PHOS 3.4 05/15/2014 04:52 PM    BUN 19 10/27/2023 05:12 PM    BUN 21 10/09/2023 04:56 AM    BUN 20 10/08/2023 04:47 AM    CREATININE 0.9 10/27/2023 05:12 PM    CREATININE 1.0 10/09/2023 04:56 AM    CREATININE 1.1 10/08/2023 04:47 AM     BNP:   Lab Results   Component Value Date/Time    PROBNP 212 10/27/2023 05:12 PM    PROBNP 337 10/08/2023 04:47 AM    PROBNP 1,288 10/05/2023 08:19 PM     Iron Studies:    Lab Results   Component Value Date/Time    TIBC 412 06/13/2014 03:44 PM    TIBC 375 10/17/2013 03:33 PM    FERRITIN 34 10/17/2013 03:33 PM     Lab Results   Component Value Date    IRON 45 06/13/2014    TIBC 412 06/13/2014    FERRITIN 34 10/17/2013        Assessment/Plan:    1. diastolic congestive heart failure (HCC) - compensated, continue entresto, brady and jardiance   2. PAF (paroxysmal atrial fibrillation) (HCC) -s/p DCCV, continue betapace and AC   3. Hypertension, unspecified type - controlled   4. Aortic valve stenosis- echo pending             Instructions:   Medications: no change in meds  Labs: at next OV  Lifestyle Recommendations: Weigh yourself every day in the morning after urination, call Mark if wt increases 2-3lb in one day or 5lb in one week, Limit sodium to 3000mg/day and fluids to 2L or 64oz/day.   Follow up:4months      Mobile CHF Resource Line: [phone]           I appreciate the opportunity of cooperating in the care of this individual.    MARK MCNALLY, AQUILES - CNP, 5/2/2024, 3:47 PM

## 2024-11-18 NOTE — DISCHARGE NOTE PROVIDER - NS AS DC PROVIDER CONTACT Y/N MULTI
Initiate Treatment: triamcinolone acetonide 0.1 % topical cream BID: Apply twice daily to rash up to 2 weeks/month as needed. Render In Strict Bullet Format?: No Detail Level: Simple Yes Plan: Apply aquaphore throughout the day Initiate Treatment: mupirocin 2 % topical ointment \\nQuantity: 15.0 g  Days Supply: 30\\nSig: Mix with equal parts of hydrocortisone and apply bid to AA around mouth for two weeks, prn.\\n\\nhydrocortisone 2.5 % topical cream BID\\nQuantity: 20.0 g  Days Supply: 30\\nSig: Mix equal parts with mupirocin and apply to AA around mouth bid.

## 2025-01-09 NOTE — ED ADULT NURSE NOTE - CAS TRG GENERAL NORM CIRC DET
GERALDO for patient to call back to schedule an follow up appointment with Dr. Roche ....ls1/9/25 per in basket    Capillary refill less/equal to 2 seconds/Strong peripheral pulses

## 2025-01-19 NOTE — ED PROVIDER NOTE - NS ED MD DISPO DISCHARGE CCDA
Continued Stay SW/CM Assessment/Plan of Care Note       Active Substitute Decision Maker (SDM)    There are no active Substitute Decision Maker (SDM) on file.           Progress note:  MD order received for [facility].  Referral made to [facility] and is pending review.  Pt to discharge home today.    See SW/CM flowsheets for other objective data.    Disposition Recommendations:  Preliminary discharge destination: Planned Discharge Destination: Home with services/support  SW/CM recommendation for discharge: Home, Home care, Home therapy    Destination Pharmacy:        Discharge Plan/Needs:     Continued Care and Services - Admitted Since 1/13/2025       Home Medical Care       Service Provider Request Status Selected Services Address Phone Fax    [facility] Pending - No Request Sent -- [address] [place] [id number] [phone]                      Devices/ Equipment that need to be arranged for discharge: None at this time     Prior To Hospitalization:    Living Situation: Spouse and residing at House    .  Support Systems: Friends, Family members   Home Devices/Equipment: Sensory assist device, Blood pressure monitor (Knee braces, walker, back equipment)            Mobility Assist Devices: Cane   Type of Service Prior to Hospitalization: None               Patient/Family discharge goal (s):  Home, Home therapy     Resources provided:           Prior Function  Bed Mobility: Independent (01/17/25 1640 : [place], PT)  Transfers: Independent (01/17/25 1640 : [place], PT)  Ambulation in the Home: Independent (01/17/25 1640 : [place], PT)  Ambulation in the Community: Independent (01/17/25 1640 : [place], PT)    Current Function  Last Filed Values         Value Time User    Current Function  slightly below baseline level of function 1/17/2025  4:50 PM [place], PT    Therapy Impairments  activity tolerance; balance; strength; pain;  coordination; safety awareness; ROM 1/17/2025  4:50 PM [place], PT    ADLs Requiring Support  bed mobility; transfers; ambulation; stairs 1/17/2025  4:50 PM [place], PT            Therapy Recommendations for Discharge:   PT:      Last Filed Values         Value Time User    PT Discharge Needs  therapy 1-3 times per week 1/19/2025 10:14 AM Donavon Joaquin PT          OT:       Last Filed Values       None          SLP:    Last Filed Values       None            Mobility Equipment Recommended for Discharge: owns a 2 WW and crutches      Barriers to Discharge  Identified Barriers to Discharge/Transition Planning: None               Patient/Caregiver provided printed discharge information.

## 2025-05-06 NOTE — PROGRESS NOTE ADULT - PROBLEM SELECTOR PROBLEM 3
[phone]    Central Scheduling       [phone]       Want to say \"thank you\" to your nurse?   Scan the QR code below to nominate an extraordinary nurse for The [employer] Award.       [email or web address]     HTN (Hypertension)

## 2025-07-08 NOTE — BEHAVIORAL HEALTH ASSESSMENT NOTE - NSBHCONSULTRECOMMENDOTHER_PSY_A_CORE FT
----- Message from Felicity Millard MD sent at 7/8/2025 11:29 AM CDT -----  Please notify the patient of results.  CBC, thyroid function normal.  Lipid panel within normal.  Glucose and A1c normal.  Kidney and liver functions within normal.  Cortisol level within normal.  Follow-up as planned.   -repeat EKG to monitor QTc (last QTc 432 in June)  -NOTE: IM ZYPREXA SHOULD NOT BE COMBINED WITH ATIVAN DUE TO RISK FOR RESPIRATORY DEPRESSION; AVOID USING ATIVAN IN THIS PATIENT  -delirium recommendations: frequent reorientation, calm environment, light during day & dark at night, ensure patient has glasses, hearing aid if applicable -repeat EKG to monitor QTc (last QTc 432 in June)  -NOTE: IM ZYPREXA SHOULD NOT BE COMBINED WITH ATIVAN DUE TO RISK FOR RESPIRATORY DEPRESSION; AVOID USING ATIVAN IN THIS PATIENT  -delirium recommendations: frequent reorientation, calm environment, light during day & dark at night, ensure patient has glasses  -clarify cigarette history, nicotine patch if patient currently still smoking
